# Patient Record
Sex: MALE | Race: WHITE | NOT HISPANIC OR LATINO | Employment: FULL TIME | URBAN - METROPOLITAN AREA
[De-identification: names, ages, dates, MRNs, and addresses within clinical notes are randomized per-mention and may not be internally consistent; named-entity substitution may affect disease eponyms.]

---

## 2017-01-06 ENCOUNTER — COMMUNICATION - HEALTHEAST (OUTPATIENT)
Dept: SCHEDULING | Facility: CLINIC | Age: 37
End: 2017-01-06

## 2017-01-06 DIAGNOSIS — I10 UNSPECIFIED ESSENTIAL HYPERTENSION: ICD-10-CM

## 2017-03-27 ENCOUNTER — COMMUNICATION - HEALTHEAST (OUTPATIENT)
Dept: FAMILY MEDICINE | Facility: CLINIC | Age: 37
End: 2017-03-27

## 2017-03-27 DIAGNOSIS — I10 UNSPECIFIED ESSENTIAL HYPERTENSION: ICD-10-CM

## 2017-05-18 ENCOUNTER — OFFICE VISIT - HEALTHEAST (OUTPATIENT)
Dept: FAMILY MEDICINE | Facility: CLINIC | Age: 37
End: 2017-05-18

## 2017-05-18 DIAGNOSIS — G47.33 OBSTRUCTIVE SLEEP APNEA (ADULT) (PEDIATRIC): ICD-10-CM

## 2017-05-18 DIAGNOSIS — Z00.00 HEALTH CARE MAINTENANCE: ICD-10-CM

## 2017-05-18 DIAGNOSIS — E66.9 OBESITY: ICD-10-CM

## 2017-05-18 DIAGNOSIS — I10 ESSENTIAL HYPERTENSION: ICD-10-CM

## 2017-05-19 ENCOUNTER — COMMUNICATION - HEALTHEAST (OUTPATIENT)
Dept: FAMILY MEDICINE | Facility: CLINIC | Age: 37
End: 2017-05-19

## 2017-05-19 ENCOUNTER — AMBULATORY - HEALTHEAST (OUTPATIENT)
Dept: FAMILY MEDICINE | Facility: CLINIC | Age: 37
End: 2017-05-19

## 2017-05-19 DIAGNOSIS — E87.6 HYPOKALEMIA: ICD-10-CM

## 2017-06-08 ENCOUNTER — COMMUNICATION - HEALTHEAST (OUTPATIENT)
Dept: FAMILY MEDICINE | Facility: CLINIC | Age: 37
End: 2017-06-08

## 2017-06-08 DIAGNOSIS — I10 UNSPECIFIED ESSENTIAL HYPERTENSION: ICD-10-CM

## 2017-07-10 ENCOUNTER — COMMUNICATION - HEALTHEAST (OUTPATIENT)
Dept: FAMILY MEDICINE | Facility: CLINIC | Age: 37
End: 2017-07-10

## 2017-07-10 DIAGNOSIS — I10 UNSPECIFIED ESSENTIAL HYPERTENSION: ICD-10-CM

## 2017-07-20 ENCOUNTER — AMBULATORY - HEALTHEAST (OUTPATIENT)
Dept: LAB | Facility: CLINIC | Age: 37
End: 2017-07-20

## 2017-07-20 DIAGNOSIS — E87.6 HYPOKALEMIA: ICD-10-CM

## 2017-07-21 ENCOUNTER — COMMUNICATION - HEALTHEAST (OUTPATIENT)
Dept: FAMILY MEDICINE | Facility: CLINIC | Age: 37
End: 2017-07-21

## 2017-07-21 ENCOUNTER — AMBULATORY - HEALTHEAST (OUTPATIENT)
Dept: FAMILY MEDICINE | Facility: CLINIC | Age: 37
End: 2017-07-21

## 2017-07-21 DIAGNOSIS — E87.6 HYPOKALEMIA: ICD-10-CM

## 2017-09-27 ENCOUNTER — COMMUNICATION - HEALTHEAST (OUTPATIENT)
Dept: FAMILY MEDICINE | Facility: CLINIC | Age: 37
End: 2017-09-27

## 2017-09-27 DIAGNOSIS — E87.6 HYPOKALEMIA: ICD-10-CM

## 2018-02-13 ENCOUNTER — COMMUNICATION - HEALTHEAST (OUTPATIENT)
Dept: FAMILY MEDICINE | Facility: CLINIC | Age: 38
End: 2018-02-13

## 2018-02-13 DIAGNOSIS — I10 ESSENTIAL HYPERTENSION: ICD-10-CM

## 2018-02-19 ENCOUNTER — OFFICE VISIT - HEALTHEAST (OUTPATIENT)
Dept: FAMILY MEDICINE | Facility: CLINIC | Age: 38
End: 2018-02-19

## 2018-02-19 DIAGNOSIS — I10 ESSENTIAL HYPERTENSION: ICD-10-CM

## 2018-02-19 DIAGNOSIS — E87.6 HYPOKALEMIA: ICD-10-CM

## 2018-02-19 DIAGNOSIS — R91.1 PULMONARY NODULE: ICD-10-CM

## 2018-02-19 DIAGNOSIS — Z12.9 CANCER SCREENING: ICD-10-CM

## 2018-02-19 LAB
ANION GAP SERPL CALCULATED.3IONS-SCNC: 11 MMOL/L (ref 5–18)
BUN SERPL-MCNC: 15 MG/DL (ref 8–22)
CALCIUM SERPL-MCNC: 9.6 MG/DL (ref 8.5–10.5)
CHLORIDE BLD-SCNC: 100 MMOL/L (ref 98–107)
CO2 SERPL-SCNC: 27 MMOL/L (ref 22–31)
CREAT SERPL-MCNC: 0.96 MG/DL (ref 0.7–1.3)
CREAT UR-MCNC: 213.3 MG/DL
GFR SERPL CREATININE-BSD FRML MDRD: >60 ML/MIN/1.73M2
GLUCOSE BLD-MCNC: 111 MG/DL (ref 70–125)
MICROALBUMIN UR-MCNC: 1.71 MG/DL (ref 0–1.99)
MICROALBUMIN/CREAT UR: 8 MG/G
POTASSIUM BLD-SCNC: 3.6 MMOL/L (ref 3.5–5)
SODIUM SERPL-SCNC: 138 MMOL/L (ref 136–145)

## 2018-02-23 ENCOUNTER — AMBULATORY - HEALTHEAST (OUTPATIENT)
Dept: PULMONOLOGY | Facility: OTHER | Age: 38
End: 2018-02-23

## 2018-02-23 DIAGNOSIS — R91.1 LUNG NODULE: ICD-10-CM

## 2018-03-12 ENCOUNTER — COMMUNICATION - HEALTHEAST (OUTPATIENT)
Dept: FAMILY MEDICINE | Facility: CLINIC | Age: 38
End: 2018-03-12

## 2018-07-25 ENCOUNTER — COMMUNICATION - HEALTHEAST (OUTPATIENT)
Dept: FAMILY MEDICINE | Facility: CLINIC | Age: 38
End: 2018-07-25

## 2018-07-25 DIAGNOSIS — I10 ESSENTIAL HYPERTENSION: ICD-10-CM

## 2018-08-07 ENCOUNTER — COMMUNICATION - HEALTHEAST (OUTPATIENT)
Dept: FAMILY MEDICINE | Facility: CLINIC | Age: 38
End: 2018-08-07

## 2018-08-07 ENCOUNTER — OFFICE VISIT - HEALTHEAST (OUTPATIENT)
Dept: FAMILY MEDICINE | Facility: CLINIC | Age: 38
End: 2018-08-07

## 2018-08-07 DIAGNOSIS — R41.840 DIFFICULTY CONCENTRATING: ICD-10-CM

## 2018-08-07 DIAGNOSIS — G47.33 OSA (OBSTRUCTIVE SLEEP APNEA): ICD-10-CM

## 2018-08-07 LAB — TSH SERPL DL<=0.005 MIU/L-ACNC: 1.06 UIU/ML (ref 0.3–5)

## 2018-09-11 ENCOUNTER — RECORDS - HEALTHEAST (OUTPATIENT)
Dept: ADMINISTRATIVE | Facility: OTHER | Age: 38
End: 2018-09-11

## 2018-10-12 ENCOUNTER — OFFICE VISIT - HEALTHEAST (OUTPATIENT)
Dept: FAMILY MEDICINE | Facility: CLINIC | Age: 38
End: 2018-10-12

## 2018-10-12 DIAGNOSIS — I10 ESSENTIAL HYPERTENSION: ICD-10-CM

## 2018-10-12 DIAGNOSIS — Z79.899 CONTROLLED SUBSTANCE AGREEMENT SIGNED: ICD-10-CM

## 2018-10-12 DIAGNOSIS — F90.9 ADHD (ATTENTION DEFICIT HYPERACTIVITY DISORDER): ICD-10-CM

## 2018-10-12 DIAGNOSIS — E87.6 HYPOKALEMIA: ICD-10-CM

## 2018-10-12 LAB
AMPHETAMINES UR QL SCN: NORMAL
ANION GAP SERPL CALCULATED.3IONS-SCNC: 13 MMOL/L (ref 5–18)
BARBITURATES UR QL: NORMAL
BENZODIAZ UR QL: NORMAL
BUN SERPL-MCNC: 16 MG/DL (ref 8–22)
CALCIUM SERPL-MCNC: 9.7 MG/DL (ref 8.5–10.5)
CANNABINOIDS UR QL SCN: NORMAL
CHLORIDE BLD-SCNC: 98 MMOL/L (ref 98–107)
CO2 SERPL-SCNC: 29 MMOL/L (ref 22–31)
COCAINE UR QL: NORMAL
CREAT SERPL-MCNC: 0.96 MG/DL (ref 0.7–1.3)
CREAT UR-MCNC: 99.9 MG/DL
GFR SERPL CREATININE-BSD FRML MDRD: >60 ML/MIN/1.73M2
GLUCOSE BLD-MCNC: 105 MG/DL (ref 70–125)
METHADONE UR QL SCN: NORMAL
OPIATES UR QL SCN: NORMAL
OXYCODONE UR QL: NORMAL
PCP UR QL SCN: NORMAL
POTASSIUM BLD-SCNC: 3.1 MMOL/L (ref 3.5–5)
SODIUM SERPL-SCNC: 140 MMOL/L (ref 136–145)

## 2018-10-13 ENCOUNTER — AMBULATORY - HEALTHEAST (OUTPATIENT)
Dept: FAMILY MEDICINE | Facility: CLINIC | Age: 38
End: 2018-10-13

## 2018-10-13 ENCOUNTER — COMMUNICATION - HEALTHEAST (OUTPATIENT)
Dept: FAMILY MEDICINE | Facility: CLINIC | Age: 38
End: 2018-10-13

## 2018-10-13 DIAGNOSIS — I10 ESSENTIAL HYPERTENSION: ICD-10-CM

## 2018-10-13 DIAGNOSIS — E87.6 HYPOKALEMIA: ICD-10-CM

## 2018-10-15 ENCOUNTER — COMMUNICATION - HEALTHEAST (OUTPATIENT)
Dept: FAMILY MEDICINE | Facility: CLINIC | Age: 38
End: 2018-10-15

## 2018-11-06 ENCOUNTER — COMMUNICATION - HEALTHEAST (OUTPATIENT)
Dept: FAMILY MEDICINE | Facility: CLINIC | Age: 38
End: 2018-11-06

## 2018-11-06 DIAGNOSIS — F90.9 ADHD (ATTENTION DEFICIT HYPERACTIVITY DISORDER): ICD-10-CM

## 2018-11-16 ENCOUNTER — OFFICE VISIT - HEALTHEAST (OUTPATIENT)
Dept: FAMILY MEDICINE | Facility: CLINIC | Age: 38
End: 2018-11-16

## 2018-11-16 DIAGNOSIS — E87.6 HYPOKALEMIA: ICD-10-CM

## 2018-11-16 DIAGNOSIS — F90.9 ATTENTION DEFICIT HYPERACTIVITY DISORDER (ADHD), UNSPECIFIED ADHD TYPE: ICD-10-CM

## 2018-11-16 DIAGNOSIS — I10 ESSENTIAL HYPERTENSION: ICD-10-CM

## 2018-11-16 LAB
ANION GAP SERPL CALCULATED.3IONS-SCNC: 14 MMOL/L (ref 5–18)
BUN SERPL-MCNC: 19 MG/DL (ref 8–22)
CALCIUM SERPL-MCNC: 9.4 MG/DL (ref 8.5–10.5)
CHLORIDE BLD-SCNC: 98 MMOL/L (ref 98–107)
CO2 SERPL-SCNC: 24 MMOL/L (ref 22–31)
CREAT SERPL-MCNC: 1 MG/DL (ref 0.7–1.3)
GFR SERPL CREATININE-BSD FRML MDRD: >60 ML/MIN/1.73M2
GLUCOSE BLD-MCNC: 157 MG/DL (ref 70–125)
POTASSIUM BLD-SCNC: 2.9 MMOL/L (ref 3.5–5)
SODIUM SERPL-SCNC: 136 MMOL/L (ref 136–145)

## 2018-11-17 ENCOUNTER — COMMUNICATION - HEALTHEAST (OUTPATIENT)
Dept: FAMILY MEDICINE | Facility: CLINIC | Age: 38
End: 2018-11-17

## 2018-11-19 ENCOUNTER — AMBULATORY - HEALTHEAST (OUTPATIENT)
Dept: FAMILY MEDICINE | Facility: CLINIC | Age: 38
End: 2018-11-19

## 2018-11-19 ENCOUNTER — COMMUNICATION - HEALTHEAST (OUTPATIENT)
Dept: FAMILY MEDICINE | Facility: CLINIC | Age: 38
End: 2018-11-19

## 2018-11-19 DIAGNOSIS — R73.9 ELEVATED BLOOD SUGAR: ICD-10-CM

## 2018-11-19 DIAGNOSIS — I10 ESSENTIAL HYPERTENSION: ICD-10-CM

## 2018-12-07 ENCOUNTER — COMMUNICATION - HEALTHEAST (OUTPATIENT)
Dept: FAMILY MEDICINE | Facility: CLINIC | Age: 38
End: 2018-12-07

## 2018-12-07 DIAGNOSIS — F90.9 ADHD (ATTENTION DEFICIT HYPERACTIVITY DISORDER): ICD-10-CM

## 2019-01-07 ENCOUNTER — COMMUNICATION - HEALTHEAST (OUTPATIENT)
Dept: FAMILY MEDICINE | Facility: CLINIC | Age: 39
End: 2019-01-07

## 2019-01-07 DIAGNOSIS — F90.9 ADHD (ATTENTION DEFICIT HYPERACTIVITY DISORDER): ICD-10-CM

## 2019-02-08 ENCOUNTER — COMMUNICATION - HEALTHEAST (OUTPATIENT)
Dept: FAMILY MEDICINE | Facility: CLINIC | Age: 39
End: 2019-02-08

## 2019-02-08 DIAGNOSIS — F90.9 ADHD (ATTENTION DEFICIT HYPERACTIVITY DISORDER): ICD-10-CM

## 2019-02-15 ENCOUNTER — COMMUNICATION - HEALTHEAST (OUTPATIENT)
Dept: FAMILY MEDICINE | Facility: CLINIC | Age: 39
End: 2019-02-15

## 2019-02-22 ENCOUNTER — COMMUNICATION - HEALTHEAST (OUTPATIENT)
Dept: FAMILY MEDICINE | Facility: CLINIC | Age: 39
End: 2019-02-22

## 2019-02-22 DIAGNOSIS — I10 ESSENTIAL HYPERTENSION: ICD-10-CM

## 2019-03-13 ENCOUNTER — COMMUNICATION - HEALTHEAST (OUTPATIENT)
Dept: FAMILY MEDICINE | Facility: CLINIC | Age: 39
End: 2019-03-13

## 2019-03-13 DIAGNOSIS — F90.9 ADHD (ATTENTION DEFICIT HYPERACTIVITY DISORDER): ICD-10-CM

## 2019-04-02 ENCOUNTER — COMMUNICATION - HEALTHEAST (OUTPATIENT)
Dept: FAMILY MEDICINE | Facility: CLINIC | Age: 39
End: 2019-04-02

## 2019-04-02 ENCOUNTER — AMBULATORY - HEALTHEAST (OUTPATIENT)
Dept: FAMILY MEDICINE | Facility: CLINIC | Age: 39
End: 2019-04-02

## 2019-04-02 ENCOUNTER — OFFICE VISIT - HEALTHEAST (OUTPATIENT)
Dept: FAMILY MEDICINE | Facility: CLINIC | Age: 39
End: 2019-04-02

## 2019-04-02 DIAGNOSIS — F90.9 ADHD (ATTENTION DEFICIT HYPERACTIVITY DISORDER): ICD-10-CM

## 2019-04-02 DIAGNOSIS — Z79.899 CONTROLLED SUBSTANCE AGREEMENT SIGNED: ICD-10-CM

## 2019-04-02 DIAGNOSIS — I10 ESSENTIAL HYPERTENSION: ICD-10-CM

## 2019-04-02 DIAGNOSIS — R73.9 ELEVATED BLOOD SUGAR: ICD-10-CM

## 2019-04-02 DIAGNOSIS — F90.9 ATTENTION DEFICIT HYPERACTIVITY DISORDER (ADHD), UNSPECIFIED ADHD TYPE: ICD-10-CM

## 2019-04-02 LAB
ANION GAP SERPL CALCULATED.3IONS-SCNC: 11 MMOL/L (ref 5–18)
BUN SERPL-MCNC: 17 MG/DL (ref 8–22)
CALCIUM SERPL-MCNC: 9.8 MG/DL (ref 8.5–10.5)
CHLORIDE BLD-SCNC: 104 MMOL/L (ref 98–107)
CO2 SERPL-SCNC: 25 MMOL/L (ref 22–31)
CREAT SERPL-MCNC: 0.99 MG/DL (ref 0.7–1.3)
GFR SERPL CREATININE-BSD FRML MDRD: >60 ML/MIN/1.73M2
GLUCOSE BLD-MCNC: 113 MG/DL (ref 70–125)
HBA1C MFR BLD: 5.3 % (ref 3.5–6)
POTASSIUM BLD-SCNC: 4.1 MMOL/L (ref 3.5–5)
SODIUM SERPL-SCNC: 140 MMOL/L (ref 136–145)

## 2019-04-09 ENCOUNTER — COMMUNICATION - HEALTHEAST (OUTPATIENT)
Dept: FAMILY MEDICINE | Facility: CLINIC | Age: 39
End: 2019-04-09

## 2019-04-09 DIAGNOSIS — F90.9 ADHD (ATTENTION DEFICIT HYPERACTIVITY DISORDER): ICD-10-CM

## 2019-04-12 ENCOUNTER — COMMUNICATION - HEALTHEAST (OUTPATIENT)
Dept: FAMILY MEDICINE | Facility: CLINIC | Age: 39
End: 2019-04-12

## 2019-04-29 ENCOUNTER — COMMUNICATION - HEALTHEAST (OUTPATIENT)
Dept: FAMILY MEDICINE | Facility: CLINIC | Age: 39
End: 2019-04-29

## 2019-04-29 DIAGNOSIS — I10 ESSENTIAL HYPERTENSION: ICD-10-CM

## 2019-05-10 ENCOUNTER — COMMUNICATION - HEALTHEAST (OUTPATIENT)
Dept: FAMILY MEDICINE | Facility: CLINIC | Age: 39
End: 2019-05-10

## 2019-05-10 DIAGNOSIS — F90.9 ADHD (ATTENTION DEFICIT HYPERACTIVITY DISORDER): ICD-10-CM

## 2019-06-11 ENCOUNTER — COMMUNICATION - HEALTHEAST (OUTPATIENT)
Dept: FAMILY MEDICINE | Facility: CLINIC | Age: 39
End: 2019-06-11

## 2019-06-11 DIAGNOSIS — F90.9 ADHD (ATTENTION DEFICIT HYPERACTIVITY DISORDER): ICD-10-CM

## 2019-06-19 ENCOUNTER — COMMUNICATION - HEALTHEAST (OUTPATIENT)
Dept: FAMILY MEDICINE | Facility: CLINIC | Age: 39
End: 2019-06-19

## 2019-06-19 DIAGNOSIS — I10 ESSENTIAL HYPERTENSION: ICD-10-CM

## 2019-07-17 ENCOUNTER — COMMUNICATION - HEALTHEAST (OUTPATIENT)
Dept: FAMILY MEDICINE | Facility: CLINIC | Age: 39
End: 2019-07-17

## 2019-07-17 DIAGNOSIS — F90.9 ADHD (ATTENTION DEFICIT HYPERACTIVITY DISORDER): ICD-10-CM

## 2019-08-15 ENCOUNTER — COMMUNICATION - HEALTHEAST (OUTPATIENT)
Dept: FAMILY MEDICINE | Facility: CLINIC | Age: 39
End: 2019-08-15

## 2019-08-15 DIAGNOSIS — F90.9 ADHD (ATTENTION DEFICIT HYPERACTIVITY DISORDER): ICD-10-CM

## 2019-09-17 ENCOUNTER — OFFICE VISIT - HEALTHEAST (OUTPATIENT)
Dept: FAMILY MEDICINE | Facility: CLINIC | Age: 39
End: 2019-09-17

## 2019-09-17 DIAGNOSIS — F90.9 ADHD (ATTENTION DEFICIT HYPERACTIVITY DISORDER): ICD-10-CM

## 2019-09-17 DIAGNOSIS — I10 ESSENTIAL HYPERTENSION: ICD-10-CM

## 2019-09-17 DIAGNOSIS — G47.33 OSA (OBSTRUCTIVE SLEEP APNEA): ICD-10-CM

## 2019-09-17 DIAGNOSIS — Z79.899 CONTROLLED SUBSTANCE AGREEMENT SIGNED: ICD-10-CM

## 2019-09-17 LAB
AMPHETAMINES UR QL SCN: ABNORMAL
BARBITURATES UR QL: ABNORMAL
BENZODIAZ UR QL: ABNORMAL
CANNABINOIDS UR QL SCN: ABNORMAL
COCAINE UR QL: ABNORMAL
CREAT UR-MCNC: 169.1 MG/DL
METHADONE UR QL SCN: ABNORMAL
OPIATES UR QL SCN: ABNORMAL
OXYCODONE UR QL: ABNORMAL
PCP UR QL SCN: ABNORMAL

## 2019-09-17 ASSESSMENT — MIFFLIN-ST. JEOR: SCORE: 2448.51

## 2019-09-18 ENCOUNTER — COMMUNICATION - HEALTHEAST (OUTPATIENT)
Dept: FAMILY MEDICINE | Facility: CLINIC | Age: 39
End: 2019-09-18

## 2019-09-19 ENCOUNTER — COMMUNICATION - HEALTHEAST (OUTPATIENT)
Dept: FAMILY MEDICINE | Facility: CLINIC | Age: 39
End: 2019-09-19

## 2019-09-19 DIAGNOSIS — I10 ESSENTIAL HYPERTENSION: ICD-10-CM

## 2019-09-19 DIAGNOSIS — F90.9 ADHD (ATTENTION DEFICIT HYPERACTIVITY DISORDER): ICD-10-CM

## 2019-10-15 ENCOUNTER — COMMUNICATION - HEALTHEAST (OUTPATIENT)
Dept: FAMILY MEDICINE | Facility: CLINIC | Age: 39
End: 2019-10-15

## 2019-10-15 DIAGNOSIS — F90.9 ADHD (ATTENTION DEFICIT HYPERACTIVITY DISORDER): ICD-10-CM

## 2019-11-18 ENCOUNTER — COMMUNICATION - HEALTHEAST (OUTPATIENT)
Dept: FAMILY MEDICINE | Facility: CLINIC | Age: 39
End: 2019-11-18

## 2019-11-18 DIAGNOSIS — F90.9 ADHD (ATTENTION DEFICIT HYPERACTIVITY DISORDER): ICD-10-CM

## 2019-11-27 ENCOUNTER — RECORDS - HEALTHEAST (OUTPATIENT)
Dept: ADMINISTRATIVE | Facility: OTHER | Age: 39
End: 2019-11-27

## 2019-12-23 ENCOUNTER — COMMUNICATION - HEALTHEAST (OUTPATIENT)
Dept: FAMILY MEDICINE | Facility: CLINIC | Age: 39
End: 2019-12-23

## 2019-12-23 DIAGNOSIS — F90.9 ADHD (ATTENTION DEFICIT HYPERACTIVITY DISORDER): ICD-10-CM

## 2019-12-24 ENCOUNTER — COMMUNICATION - HEALTHEAST (OUTPATIENT)
Dept: FAMILY MEDICINE | Facility: CLINIC | Age: 39
End: 2019-12-24

## 2019-12-24 DIAGNOSIS — F90.9 ADHD (ATTENTION DEFICIT HYPERACTIVITY DISORDER): ICD-10-CM

## 2020-01-22 ENCOUNTER — COMMUNICATION - HEALTHEAST (OUTPATIENT)
Dept: FAMILY MEDICINE | Facility: CLINIC | Age: 40
End: 2020-01-22

## 2020-01-22 DIAGNOSIS — F90.9 ADHD (ATTENTION DEFICIT HYPERACTIVITY DISORDER): ICD-10-CM

## 2020-02-24 ENCOUNTER — COMMUNICATION - HEALTHEAST (OUTPATIENT)
Dept: FAMILY MEDICINE | Facility: CLINIC | Age: 40
End: 2020-02-24

## 2020-02-24 DIAGNOSIS — F90.9 ADHD (ATTENTION DEFICIT HYPERACTIVITY DISORDER): ICD-10-CM

## 2020-03-24 ENCOUNTER — COMMUNICATION - HEALTHEAST (OUTPATIENT)
Dept: FAMILY MEDICINE | Facility: CLINIC | Age: 40
End: 2020-03-24

## 2020-03-24 DIAGNOSIS — F90.9 ADHD (ATTENTION DEFICIT HYPERACTIVITY DISORDER): ICD-10-CM

## 2020-04-01 ENCOUNTER — COMMUNICATION - HEALTHEAST (OUTPATIENT)
Dept: FAMILY MEDICINE | Facility: CLINIC | Age: 40
End: 2020-04-01

## 2020-04-03 ENCOUNTER — OFFICE VISIT - HEALTHEAST (OUTPATIENT)
Dept: FAMILY MEDICINE | Facility: CLINIC | Age: 40
End: 2020-04-03

## 2020-04-03 DIAGNOSIS — I10 ESSENTIAL HYPERTENSION: ICD-10-CM

## 2020-04-03 DIAGNOSIS — Z79.899 CONTROLLED SUBSTANCE AGREEMENT SIGNED: ICD-10-CM

## 2020-04-03 DIAGNOSIS — R73.03 PREDIABETES: ICD-10-CM

## 2020-04-03 DIAGNOSIS — Z00.00 HEALTH CARE MAINTENANCE: ICD-10-CM

## 2020-04-03 DIAGNOSIS — R73.03 PRE-DIABETES: ICD-10-CM

## 2020-04-27 ENCOUNTER — COMMUNICATION - HEALTHEAST (OUTPATIENT)
Dept: FAMILY MEDICINE | Facility: CLINIC | Age: 40
End: 2020-04-27

## 2020-04-27 DIAGNOSIS — F90.9 ADHD (ATTENTION DEFICIT HYPERACTIVITY DISORDER): ICD-10-CM

## 2020-06-01 ENCOUNTER — COMMUNICATION - HEALTHEAST (OUTPATIENT)
Dept: FAMILY MEDICINE | Facility: CLINIC | Age: 40
End: 2020-06-01

## 2020-06-01 DIAGNOSIS — F90.9 ADHD (ATTENTION DEFICIT HYPERACTIVITY DISORDER): ICD-10-CM

## 2020-06-03 ENCOUNTER — COMMUNICATION - HEALTHEAST (OUTPATIENT)
Dept: FAMILY MEDICINE | Facility: CLINIC | Age: 40
End: 2020-06-03

## 2020-06-03 DIAGNOSIS — I10 ESSENTIAL HYPERTENSION: ICD-10-CM

## 2020-06-10 ENCOUNTER — COMMUNICATION - HEALTHEAST (OUTPATIENT)
Dept: FAMILY MEDICINE | Facility: CLINIC | Age: 40
End: 2020-06-10

## 2020-06-10 DIAGNOSIS — I10 ESSENTIAL HYPERTENSION: ICD-10-CM

## 2020-06-29 ENCOUNTER — AMBULATORY - HEALTHEAST (OUTPATIENT)
Dept: LAB | Facility: CLINIC | Age: 40
End: 2020-06-29

## 2020-06-29 DIAGNOSIS — Z00.00 HEALTH CARE MAINTENANCE: ICD-10-CM

## 2020-06-29 DIAGNOSIS — I10 ESSENTIAL HYPERTENSION: ICD-10-CM

## 2020-06-29 DIAGNOSIS — R73.03 PRE-DIABETES: ICD-10-CM

## 2020-06-29 LAB
ANION GAP SERPL CALCULATED.3IONS-SCNC: 11 MMOL/L (ref 5–18)
BUN SERPL-MCNC: 14 MG/DL (ref 8–22)
CALCIUM SERPL-MCNC: 9.5 MG/DL (ref 8.5–10.5)
CHLORIDE BLD-SCNC: 105 MMOL/L (ref 98–107)
CHOLEST SERPL-MCNC: 148 MG/DL
CO2 SERPL-SCNC: 24 MMOL/L (ref 22–31)
CREAT SERPL-MCNC: 1.14 MG/DL (ref 0.7–1.3)
FASTING STATUS PATIENT QL REPORTED: YES
GFR SERPL CREATININE-BSD FRML MDRD: >60 ML/MIN/1.73M2
GLUCOSE BLD-MCNC: 96 MG/DL (ref 70–125)
HBA1C MFR BLD: 5.3 % (ref 3.5–6)
HDLC SERPL-MCNC: 28 MG/DL
LDLC SERPL CALC-MCNC: ABNORMAL MG/DL
POTASSIUM BLD-SCNC: 4 MMOL/L (ref 3.5–5)
SODIUM SERPL-SCNC: 140 MMOL/L (ref 136–145)
TRIGL SERPL-MCNC: 568 MG/DL

## 2020-06-30 ENCOUNTER — COMMUNICATION - HEALTHEAST (OUTPATIENT)
Dept: FAMILY MEDICINE | Facility: CLINIC | Age: 40
End: 2020-06-30

## 2020-06-30 DIAGNOSIS — E78.1 HYPERTRIGLYCERIDEMIA: ICD-10-CM

## 2020-07-06 ENCOUNTER — COMMUNICATION - HEALTHEAST (OUTPATIENT)
Dept: FAMILY MEDICINE | Facility: CLINIC | Age: 40
End: 2020-07-06

## 2020-07-06 DIAGNOSIS — F90.9 ADHD (ATTENTION DEFICIT HYPERACTIVITY DISORDER): ICD-10-CM

## 2020-07-10 ENCOUNTER — COMMUNICATION - HEALTHEAST (OUTPATIENT)
Dept: FAMILY MEDICINE | Facility: CLINIC | Age: 40
End: 2020-07-10

## 2020-08-06 ENCOUNTER — COMMUNICATION - HEALTHEAST (OUTPATIENT)
Dept: FAMILY MEDICINE | Facility: CLINIC | Age: 40
End: 2020-08-06

## 2020-08-06 DIAGNOSIS — F90.9 ADHD (ATTENTION DEFICIT HYPERACTIVITY DISORDER): ICD-10-CM

## 2020-09-08 ENCOUNTER — COMMUNICATION - HEALTHEAST (OUTPATIENT)
Dept: FAMILY MEDICINE | Facility: CLINIC | Age: 40
End: 2020-09-08

## 2020-09-08 DIAGNOSIS — F90.9 ADHD (ATTENTION DEFICIT HYPERACTIVITY DISORDER): ICD-10-CM

## 2020-09-15 ENCOUNTER — OFFICE VISIT - HEALTHEAST (OUTPATIENT)
Dept: FAMILY MEDICINE | Facility: CLINIC | Age: 40
End: 2020-09-15

## 2020-09-15 DIAGNOSIS — G47.33 OSA (OBSTRUCTIVE SLEEP APNEA): ICD-10-CM

## 2020-09-15 DIAGNOSIS — E78.1 HYPERTRIGLYCERIDEMIA: ICD-10-CM

## 2020-09-15 DIAGNOSIS — Z79.899 CONTROLLED SUBSTANCE AGREEMENT SIGNED: ICD-10-CM

## 2020-09-22 ENCOUNTER — COMMUNICATION - HEALTHEAST (OUTPATIENT)
Dept: FAMILY MEDICINE | Facility: CLINIC | Age: 40
End: 2020-09-22

## 2020-09-22 ENCOUNTER — AMBULATORY - HEALTHEAST (OUTPATIENT)
Dept: LAB | Facility: CLINIC | Age: 40
End: 2020-09-22

## 2020-09-22 DIAGNOSIS — Z79.899 CONTROLLED SUBSTANCE AGREEMENT SIGNED: ICD-10-CM

## 2020-09-22 DIAGNOSIS — E78.1 HYPERTRIGLYCERIDEMIA: ICD-10-CM

## 2020-09-22 LAB
AMPHETAMINES UR QL SCN: ABNORMAL
BARBITURATES UR QL: ABNORMAL
BENZODIAZ UR QL: ABNORMAL
CANNABINOIDS UR QL SCN: ABNORMAL
CHOLEST SERPL-MCNC: 135 MG/DL
COCAINE UR QL: ABNORMAL
CREAT UR-MCNC: 261.9 MG/DL
FASTING STATUS PATIENT QL REPORTED: ABNORMAL
HDLC SERPL-MCNC: 30 MG/DL
LDLC SERPL CALC-MCNC: 59 MG/DL
OPIATES UR QL SCN: ABNORMAL
OXYCODONE UR QL: ABNORMAL
PCP UR QL SCN: ABNORMAL
TRIGL SERPL-MCNC: 228 MG/DL
TSH SERPL DL<=0.005 MIU/L-ACNC: 1.33 UIU/ML (ref 0.3–5)

## 2020-09-29 RX ORDER — DEXTROAMPHETAMINE SACCHARATE, AMPHETAMINE ASPARTATE MONOHYDRATE, DEXTROAMPHETAMINE SULFATE AND AMPHETAMINE SULFATE 5; 5; 5; 5 MG/1; MG/1; MG/1; MG/1
CAPSULE, EXTENDED RELEASE ORAL
COMMUNITY
Start: 2019-10-18 | End: 2021-08-04

## 2020-09-29 RX ORDER — LOSARTAN POTASSIUM 100 MG/1
TABLET ORAL
COMMUNITY
Start: 2019-09-17 | End: 2022-02-28

## 2020-09-30 ENCOUNTER — VIRTUAL VISIT (OUTPATIENT)
Dept: SLEEP MEDICINE | Facility: CLINIC | Age: 40
End: 2020-09-30
Payer: COMMERCIAL

## 2020-09-30 VITALS — HEIGHT: 76 IN | WEIGHT: 314 LBS | BODY MASS INDEX: 38.24 KG/M2

## 2020-09-30 DIAGNOSIS — G47.33 OBSTRUCTIVE SLEEP APNEA: Primary | ICD-10-CM

## 2020-09-30 DIAGNOSIS — G47.10 HYPERSOMNIA: ICD-10-CM

## 2020-09-30 PROBLEM — F90.9 ADHD (ATTENTION DEFICIT HYPERACTIVITY DISORDER): Status: ACTIVE | Noted: 2018-10-12

## 2020-09-30 PROCEDURE — 99201 ZZC OFFICE/OUTPT VISIT, NEW, LEVEL I: CPT | Mod: GT | Performed by: INTERNAL MEDICINE

## 2020-09-30 ASSESSMENT — MIFFLIN-ST. JEOR: SCORE: 2445.79

## 2020-09-30 NOTE — PROGRESS NOTES
"Michael Cunningham is a 40 year old male who is being evaluated via a billable video visit.      The patient has been notified of following:     \"This video visit will be conducted via a call between you and your physician/provider. We have found that certain health care needs can be provided without the need for an in-person physical exam.  This service lets us provide the care you need with a video conversation.  If a prescription is necessary we can send it directly to your pharmacy.  If lab work is needed we can place an order for that and you can then stop by our lab to have the test done at a later time.    Video visits are billed at different rates depending on your insurance coverage.  Please reach out to your insurance provider with any questions.    If during the course of the call the physician/provider feels a video visit is not appropriate, you will not be charged for this service.\"    Patient has given verbal consent for Video visit? Yes  How would you like to obtain your AVS? MyChart  If you are dropped from the video visit, the video invite should be resent to: Text to cell phone: [phone]  Will anyone else be joining your video visit? No    Video-Visit Details    Type of service:  Video Visit    Originating Location (pt. Location): Home    Distant Location (provider location):  [facility]     Platform used for Video Visit: MST    Audio and visual devices were used for this virtual clinic visit with permission from patient.    I spent a total of 15 minutes of face-to-face encounter and in preparation for this clinic visit.    Thank you for the opportunity to participate in the care of  Michael Cunningham.    Assessment and Plan:    In summary Michael Cunningham is a 40 year old year old male here for sleep disturbance.  1. Obstructive sleep apnea  Due to the fact that we already have a copy of the patient's sleep study in our records, no need to repeat sleep.  I will therefore write " a prescription for the patient to receive a new CPAP machine from [facility] as per his request.  I welcomed the patient follow-up with me in 6 weeks.  - COMPREHENSIVE DME    2. Hypersomnia  - COMPREHENSIVE DME      History of present illness:    He is a 40 year old male who comes to the virtual clinic for the transfer of care of his obstructive sleep.  The patient was diagnosed with obstructive sleep apnea at our facility on 11/09/2008 with an apnea hypopnea index of 11.6 events per hour with lowest O2 sat of 76%.  The patient has been on CPAP therapy since that time.  He states that he has been doing well on CPAP therapy until his machine recently broke.  He would like to establish care so that he can get a new CPAP machine.  The patient's review of systems is otherwise negative.     Ideal Sleep-Wake Cycle(devoid of societal pressure):    Patient would try to initiate sleep at around 11:30 PM with a sleep latency of 10-15 minutes. The patient would have 1 awakenings. Final wake up time is around 9 AM.    Past Medical History  Past Medical History:   Diagnosis Date     ADHD      HTN (hypertension)         Past Surgical History  History reviewed. No pertinent surgical history.     Meds  Current Outpatient Medications   Medication Sig Dispense Refill     amphetamine-dextroamphetamine (ADDERALL XR) 20 MG 24 hr capsule Take 1 capsule by mouth daily.       losartan (COZAAR) 100 MG tablet TAKE 1 TABLET BY MOUTH ONE TIME DAILY.          Allergies  Sulfa drugs     Social History  Social History     Socioeconomic History     Marital status:      Spouse name: Not on file     Number of children: Not on file     Years of education: Not on file     Highest education level: Not on file   Occupational History     Not on file   Social Needs     Financial resource strain: Not on file     Food insecurity     Worry: Not on file     Inability: Not on file     Transportation needs     Medical: Not on file     Non-medical:  Not on file   Tobacco Use     Smoking status: Former Smoker     Smokeless tobacco: Current User   Substance and Sexual Activity     Alcohol use: Not on file     Drug use: Not on file     Sexual activity: Not on file   Lifestyle     Physical activity     Days per week: Not on file     Minutes per session: Not on file     Stress: Not on file   Relationships     Social connections     Talks on phone: Not on file     Gets together: Not on file     Attends Yazidism service: Not on file     Active member of club or organization: Not on file     Attends meetings of clubs or organizations: Not on file     Relationship status: Not on file     Intimate partner violence     Fear of current or ex partner: Not on file     Emotionally abused: Not on file     Physically abused: Not on file     Forced sexual activity: Not on file   Other Topics Concern     Parent/sibling w/ CABG, MI or angioplasty before 65F 55M? Not Asked   Social History Narrative     Not on file        Family History  Family History   Problem Relation Age of Onset     Snoring Mother      Review of Systems:  Constitutional: Negative except as noted in HPI.   Eyes: Negative except as noted in HPI.   ENT: Negative except as noted in HPI.   Cardiovascular: Negative except as noted in HPI.   Respiratory: Negative except as noted in HPI.   Gastrointestinal: Negative except as noted in HPI.   Genitourinary: Negative except as noted in HPI.   Musculoskeletal: Negative except as noted in HPI.   Integumentary: Negative except as noted in HPI.   Neurological: Negative except as noted in HPI.   Psychiatric: Negative except as noted in HPI.   Endocrine: Negative except as noted in HPI.   Hematologic/Lymphatic: Negative except as noted in HPI.      Physical Exam:  GEN: NAD,   Head: Normocephalic.  EYES: EOMI  ENT: Oropharynx is clear, Lord class 4+ airway.   Neurological: Alert, oriented to time, place, and person.  Psych: normal mood, normal affect     Labs/Studies:      No results found for: PH, PHARTERIAL, PO2, [id number], SAT, PCO2, HCO3, BASEEXCESS, JEROME, BEB  No results found for: TSH  No results found for: GLC  No results found for: HGB  No results found for: BUN, CR  No results found for: AST, ALT, GGT, ALKPHOS, BILITOTAL, BILICONJ, BILIDIRECT, HARSHAD  No results found for: UAMP, UBARB, BENZODIAZEUR, UCANN, UCOC, OPIT, UPCP    No components found for: FERRITIN      Patient verbalized understanding of these issues, agrees with the plan and all questions were answered today. Patient was given an opportuntity to voice any other symptoms or concerns not listed above. Patient did not have any other symptoms or concerns.        Malik Rivas DO  Board Certified in Internal Medicine and Sleep Medicine    (Note created with Dragon voice recognition and unintended spelling errors and word substitutions may occur)

## 2020-09-30 NOTE — PATIENT INSTRUCTIONS
Your BMI is Body mass index is 38.22 kg/m .  Weight management is a personal decision.  If you are interested in exploring weight loss strategies, the following discussion covers the approaches that may be successful. Body mass index (BMI) is one way to tell whether you are at a healthy weight, overweight, or obese. It measures your weight in relation to your height.  A BMI of 18.5 to 24.9 is in the healthy range. A person with a BMI of 25 to 29.9 is considered overweight, and someone with a BMI of 30 or greater is considered obese. More than two-thirds of American adults are considered overweight or obese.  Being overweight or obese increases the risk for further weight gain. Excess weight may lead to heart disease and diabetes.  Creating and following plans for healthy eating and physical activity may help you improve your health.  Weight control is part of healthy lifestyle and includes exercise, emotional health, and healthy eating habits. Careful eating habits lifelong are the mainstay of weight control. Though there are significant health benefits from weight loss, long-term weight loss with diet alone may be very difficult to achieve- studies show long-term success with dietary management in less than 10% of people. Attaining a healthy weight may be especially difficult to achieve in those with severe obesity. In some cases, medications, devices and surgical management might be considered.  What can you do?  If you are overweight or obese and are interested in methods for weight loss, you should discuss this with your provider.     Consider reducing daily calorie intake by 500 calories.     Keep a food journal.     Avoiding skipping meals, consider cutting portions instead.    Diet combined with exercise helps maintain muscle while optimizing fat loss. Strength training is particularly important for building and maintaining muscle mass. Exercise helps reduce stress, increase energy, and improves fitness.  Increasing exercise without diet control, however, may not burn enough calories to loose weight.       Start walking three days a week 10-20 minutes at a time    Work towards walking thirty minutes five days a week     Eventually, increase the speed of your walking for 1-2 minutes at time    In addition, we recommend that you review healthy lifestyles and methods for weight loss available through the National Institutes of Health patient information sites:  http://win.niddk.nih.gov/publications/index.htm    And look into health and wellness programs that may be available through your health insurance provider, employer, local community center, or betty club.    Weight management plan: Patient was referred to their PCP to discuss a diet and exercise plan.  Equipment Instructions    We will process your PAP order and send it to a Durable Medical Equipment (DME) provider.    The medical equipment company should call you within 7 days.  If you have not heard from the company, please contact them to see if they received your order and are planning to call you.    Please call us at [phone] if you are unable to contact the medical equipment company or if they do not have the order.    If you are starting a new PAP machine, please call us after you use it the first night to let us know how it went. This call also helps us know that you received your equipment and that everything is ready. Please use our central phone number [phone]    Contact information for [employer] company:    [employer] Tel: [phone]

## 2020-10-06 ENCOUNTER — DOCUMENTATION ONLY (OUTPATIENT)
Dept: SLEEP MEDICINE | Facility: CLINIC | Age: 40
End: 2020-10-06

## 2020-10-06 NOTE — PROGRESS NOTES
10/6/2020- CALLED AND EXPLAINED THAT ONCE [facility] GETS ALL DOCUMENTATION AND VERIFIES INS HE WILL BE CALLED FORO REPLACEMENT SETUP. TOLD HIM TO CALL IN 1 WEEK IF HE HAS NOT HEARD FROM [facility].

## 2020-10-19 ENCOUNTER — TELEPHONE (OUTPATIENT)
Dept: SLEEP MEDICINE | Facility: CLINIC | Age: 40
End: 2020-10-19

## 2020-10-19 NOTE — TELEPHONE ENCOUNTER
CALLED PT AND SCHEDULED THE PT FOR A REPLACEMENT CPAP SETUP APPOINTMENT 10/21/2020 10AM AT THE [facility]. CONFIRMED LOCATION,TIME AND DATE WITH THE PT.

## 2020-10-21 ENCOUNTER — DOCUMENTATION ONLY (OUTPATIENT)
Dept: SLEEP MEDICINE | Facility: CLINIC | Age: 40
End: 2020-10-21

## 2020-10-21 NOTE — PROGRESS NOTES
Patient was offered choice of vendor and chose [facility].  Patient Michael Cunningham was set up at [facility]  on October 21, 2020. Patient received a [employer]med AirSense 10 Auto. Pressures were set at 5-15 cm H2O.   Patient s ramp is 5 cm H2O for Off and FLEX/EPR is EPR, 1.  Patient received a Resmed Mask name: Airfit F20  Full Face mask size Large, heated tubing and heated humidifier.  Patient does not need to meet compliance. Patient has a follow up on TBD with Dr. Rivas.    Josefina Carreon

## 2020-10-26 ENCOUNTER — DOCUMENTATION ONLY (OUTPATIENT)
Dept: SLEEP MEDICINE | Facility: CLINIC | Age: 40
End: 2020-10-26
Payer: COMMERCIAL

## 2020-10-26 NOTE — PROGRESS NOTES
3 DAY STM VISIT    Diagnostic AHI: 11.6  PSG    Patient contacted for 3 day STM visit.    Confirmed with patient at time of call- N/A Patient is still interested in STM service.    Replacement device: Yes  STM ordered by provider: Yes     Device type: Auto-CPAP  PAP settings from order::  CPAP min 5 cm  H20       CPAP max 15 cm  H20        Mask type:    Full Face Mask     Device settings from machine      Min CPAP 5.0            Max CPAP 15.0          EPR level Setting: ONE      RESMED Soft response setting:  OFF  Assessment: Nightly usage, most nights over four hours   Action plan: Patient to have 14 day STM visit. Patient has a follow up visit scheduled:   no.     Total time spent on accessing and  interpreting remote patient PAP therapy data  10 minutes  Total time spent counseling, coaching  and reviewing PAP therapy data with patient  1 minutes  [id number]

## 2020-11-05 ENCOUNTER — COMMUNICATION - HEALTHEAST (OUTPATIENT)
Dept: FAMILY MEDICINE | Facility: CLINIC | Age: 40
End: 2020-11-05

## 2020-11-05 ENCOUNTER — DOCUMENTATION ONLY (OUTPATIENT)
Dept: SLEEP MEDICINE | Facility: CLINIC | Age: 40
End: 2020-11-05
Payer: COMMERCIAL

## 2020-11-05 DIAGNOSIS — F90.9 ADHD (ATTENTION DEFICIT HYPERACTIVITY DISORDER): ICD-10-CM

## 2020-11-05 NOTE — PROGRESS NOTES
14  DAY STM VISIT    Diagnostic AHI: 11.6 PSG    Data only recheck     Assessment: Pt meeting objective benchmarks.       Action plan: pt to have 30 day STM visit.      Device type: Auto-CPAP    PAP settings: CPAP min 5.0 cm  H20       CPAP max 15.0 cm  H20      95th% pressure 12.8 cm  H20        RESMED EPR level Setting: ONE    RESMED Soft response setting:  OFF    Mask type:  Full Face Mask    Objective measures: 14 day rolling measures      Compliance  100 %      Leak  8.74  lpm  last  upload      AHI 1.39   last  upload      Average number of minutes 474      Objective measure goal  Compliance   Goal >70%  Leak   Goal < 24 lpm  AHI  Goal < 5  Usage  Goal >240        Total time spent on accessing and interpreting remote patient PAP therapy data  10 minutes    Total time spent counseling, coaching  and reviewing PAP therapy data with patient  1 minutes    [id number]  [id number]  no (3 day STM)

## 2020-11-20 ENCOUNTER — DOCUMENTATION ONLY (OUTPATIENT)
Dept: SLEEP MEDICINE | Facility: CLINIC | Age: 40
End: 2020-11-20

## 2020-11-20 NOTE — PROGRESS NOTES
30 DAY STM VISIT    Diagnostic AHI: 11.6  PSG     Data only recheck     Assessment: Pt meeting objective benchmarks.     Action plan: pt to have 6 month STM visit  Patient has not scheduled a follow up visit.   Device type: Auto-CPAP  PAP settings: CPAP min 5.0 cm  H20     CPAP max 15.0 cm  H20    95th% pressure 13.4 cm  H20      RESMED EPR level Setting: ONE    RESMED Soft response setting:  OFF  Mask type:  Full Face Mask  Objective measures: 14 day rolling measures      Compliance  92 %      Leak  1.37 lpm  last  upload      AHI 1.66   last  upload      Average number of minutes 478      Objective measure goal  Compliance   Goal >70%  Leak   Goal < 24 lpm  AHI  Goal < 5  Usage  Goal >240        Total time spent on accessing and interpreting remote patient PAP therapy data  10 minutes    Total time spent counseling, coaching  and reviewing PAP therapy data with patient  0 minutes     [id number] this call  [id number] no  at 3 or 14 day [facility]

## 2020-11-30 ENCOUNTER — COMMUNICATION - HEALTHEAST (OUTPATIENT)
Dept: FAMILY MEDICINE | Facility: CLINIC | Age: 40
End: 2020-11-30

## 2020-11-30 DIAGNOSIS — I10 ESSENTIAL HYPERTENSION: ICD-10-CM

## 2020-12-07 ENCOUNTER — COMMUNICATION - HEALTHEAST (OUTPATIENT)
Dept: FAMILY MEDICINE | Facility: CLINIC | Age: 40
End: 2020-12-07

## 2020-12-07 DIAGNOSIS — F90.9 ADHD (ATTENTION DEFICIT HYPERACTIVITY DISORDER): ICD-10-CM

## 2020-12-13 ENCOUNTER — HEALTH MAINTENANCE LETTER (OUTPATIENT)
Age: 40
End: 2020-12-13

## 2021-01-04 ENCOUNTER — COMMUNICATION - HEALTHEAST (OUTPATIENT)
Dept: FAMILY MEDICINE | Facility: CLINIC | Age: 41
End: 2021-01-04

## 2021-01-04 DIAGNOSIS — F90.9 ADHD (ATTENTION DEFICIT HYPERACTIVITY DISORDER): ICD-10-CM

## 2021-02-03 ENCOUNTER — COMMUNICATION - HEALTHEAST (OUTPATIENT)
Dept: FAMILY MEDICINE | Facility: CLINIC | Age: 41
End: 2021-02-03

## 2021-02-03 DIAGNOSIS — F90.9 ADHD (ATTENTION DEFICIT HYPERACTIVITY DISORDER): ICD-10-CM

## 2021-02-23 ENCOUNTER — OFFICE VISIT - HEALTHEAST (OUTPATIENT)
Dept: FAMILY MEDICINE | Facility: CLINIC | Age: 41
End: 2021-02-23

## 2021-02-23 DIAGNOSIS — Z79.899 CONTROLLED SUBSTANCE AGREEMENT SIGNED: ICD-10-CM

## 2021-02-23 DIAGNOSIS — F90.9 ATTENTION DEFICIT HYPERACTIVITY DISORDER (ADHD), UNSPECIFIED ADHD TYPE: ICD-10-CM

## 2021-02-23 DIAGNOSIS — G47.33 OSA (OBSTRUCTIVE SLEEP APNEA): ICD-10-CM

## 2021-02-23 DIAGNOSIS — I10 ESSENTIAL HYPERTENSION: ICD-10-CM

## 2021-02-23 ASSESSMENT — MIFFLIN-ST. JEOR: SCORE: 2491.15

## 2021-03-05 ENCOUNTER — COMMUNICATION - HEALTHEAST (OUTPATIENT)
Dept: FAMILY MEDICINE | Facility: CLINIC | Age: 41
End: 2021-03-05

## 2021-03-05 DIAGNOSIS — F90.9 ADHD (ATTENTION DEFICIT HYPERACTIVITY DISORDER): ICD-10-CM

## 2021-04-06 ENCOUNTER — COMMUNICATION - HEALTHEAST (OUTPATIENT)
Dept: FAMILY MEDICINE | Facility: CLINIC | Age: 41
End: 2021-04-06

## 2021-04-06 DIAGNOSIS — F90.9 ADHD (ATTENTION DEFICIT HYPERACTIVITY DISORDER): ICD-10-CM

## 2021-04-15 ENCOUNTER — AMBULATORY - HEALTHEAST (OUTPATIENT)
Dept: NURSING | Facility: CLINIC | Age: 41
End: 2021-04-15

## 2021-04-15 DIAGNOSIS — I10 ESSENTIAL HYPERTENSION: ICD-10-CM

## 2021-04-18 ENCOUNTER — AMBULATORY - HEALTHEAST (OUTPATIENT)
Dept: FAMILY MEDICINE | Facility: CLINIC | Age: 41
End: 2021-04-18

## 2021-04-18 ENCOUNTER — COMMUNICATION - HEALTHEAST (OUTPATIENT)
Dept: FAMILY MEDICINE | Facility: CLINIC | Age: 41
End: 2021-04-18

## 2021-04-18 DIAGNOSIS — F90.9 ADHD (ATTENTION DEFICIT HYPERACTIVITY DISORDER): ICD-10-CM

## 2021-04-18 DIAGNOSIS — I10 ESSENTIAL HYPERTENSION: ICD-10-CM

## 2021-05-05 ENCOUNTER — COMMUNICATION - HEALTHEAST (OUTPATIENT)
Dept: FAMILY MEDICINE | Facility: CLINIC | Age: 41
End: 2021-05-05

## 2021-05-05 DIAGNOSIS — F90.9 ADHD (ATTENTION DEFICIT HYPERACTIVITY DISORDER): ICD-10-CM

## 2021-05-27 VITALS — HEART RATE: 93 BPM | DIASTOLIC BLOOD PRESSURE: 95 MMHG | SYSTOLIC BLOOD PRESSURE: 150 MMHG

## 2021-05-27 NOTE — PATIENT INSTRUCTIONS - HE
Follow up in 6 months for CSA and UDS renewal    Check BMP    STOP POTASSIUM    Provided the potassium is normal then we will increase the losartan to 100 mg daily  After increase in losartan then nurse BP check in one week and appt in 3 weeks

## 2021-05-27 NOTE — PROGRESS NOTES
DIAGNOSIS:  1. Hypertension , slightly elevated    2. Attention deficit hyperactivity disorder (ADHD), unspecified ADHD type , stable    3. Controlled substance agreement signed         PLAN:  Patient Instructions   Follow up in 6 months for CSA and UDS renewal    Check BMP    STOP POTASSIUM    Provided the potassium is normal then we will increase the losartan to 100 mg daily  After increase in losartan then nurse BP check in one week and appt in 3 weeks            HPI: Six month controlled substance follow up.  Adderall continuing to benefit him.  Thoughts are organized.  Rare difficulty in getting to sleep.  No palpitations.  Appetite is ok. Losing weight intentionally.  Has been taking potassium two tablets daily every morning.  Review of  Letter in Nov 2018 indicates that the potassium was to be stopped after 3 days.  Feeling good.          Current Outpatient Medications on File Prior to Visit   Medication Sig Dispense Refill     dextroamphetamine-amphetamine (ADDERALL XR) 20 MG 24 hr capsule Take 1 capsule (20 mg total) by mouth daily. 30 capsule 0     losartan (COZAAR) 50 MG tablet Take 1 tablet (50 mg total) by mouth daily. 90 tablet 0     [DISCONTINUED] potassium chloride (KLOR-CON) 10 MEQ CR tablet Take 20 mEq by mouth daily.         3     No current facility-administered medications on file prior to visit.        Pmh: reviewed  Psh: reviewed  Allergy:  reviewed      EXAM:    BP (!) 141/92   Pulse 76   Temp 96.9  F (36.1  C) (Oral)   Resp 16   Wt (!) 316 lb 12.8 oz (143.7 kg)   BMI 39.60 kg/m    GEN:   ALERT, NAD, ORIENTED TIMES THREE  NECK: SUPPLE WITHOUT ADENOPATHY OR THYROMEGALY  LUNGS: CTA  COR: RRR WITHOUT MURMUR  SKIN: NO RASH , ULCERS OR LESIONS NOTED  EXT: WITHOUT EDEMA/SWELLING    No results found for this or any previous visit (from the past 168 hour(s)).

## 2021-05-27 NOTE — TELEPHONE ENCOUNTER
Controlled Substance Refill Request  Medication:   Requested Prescriptions     Pending Prescriptions Disp Refills     dextroamphetamine-amphetamine (ADDERALL XR) 20 MG 24 hr capsule [Pharmacy Med Name: Amphetamine-Dextroamphet ER Oral Capsule Extended Release 24 Hour 20 MG] 30 capsule 0     Sig: TAKE ONE CAPSULE BY MOUTH ONE TIME DAILY     Date Last Fill: 3/13/19  [facility]   Submit electronically to pharmacy    Controlled Substance Agreement on File:   Encounter-Level CSA Scan Date:    There are no encounter-level csa scan date.       Last office visit with primary: 4/2/2019

## 2021-05-28 NOTE — TELEPHONE ENCOUNTER
Refill Approved    Rx renewed per Medication Renewal Policy. Medication was last renewed on 2/23/19.    Claudine Wright, [facility] Triage/Med Refill 4/30/2019     Requested Prescriptions   Pending Prescriptions Disp Refills     losartan (COZAAR) 50 MG tablet [Pharmacy Med Name: Losartan Potassium Oral Tablet 50 MG] 90 tablet 0     Sig: Take 1 tablet (50 mg total) by mouth daily.       Angiotensin Receptor Blocker Protocol Passed - 4/29/2019 12:50 PM        Passed - PCP or prescribing provider visit in past 12 months       Last office visit with prescriber/PCP: 4/2/2019 Giles Méndez MD OR same dept: 4/2/2019 Giles Méndez MD OR same specialty: 4/2/2019 Giles Médnez MD  Last physical: Visit date not found Last MTM visit: Visit date not found   Next visit within 3 mo: Visit date not found  Next physical within 3 mo: Visit date not found  Prescriber OR PCP: Giles Méndez MD  Last diagnosis associated with med order: 1. Hypertension  - losartan (COZAAR) 50 MG tablet [Pharmacy Med Name: Losartan Potassium Oral Tablet 50 MG]; Take 1 tablet (50 mg total) by mouth daily.  Dispense: 90 tablet; Refill: 0    If protocol passes may refill for 12 months if within 3 months of last provider visit (or a total of 15 months).             Passed - Serum potassium within the past 12 months     Lab Results   Component Value Date    Potassium 4.1 04/02/2019             Passed - Blood pressure filed in past 12 months     BP Readings from Last 1 Encounters:   04/02/19 (!) 141/92             Passed - Serum creatinine within the past 12 months     Creatinine   Date Value Ref Range Status   04/02/2019 0.99 0.70 - 1.30 mg/dL Final

## 2021-05-28 NOTE — TELEPHONE ENCOUNTER
Patient is down to two tabs.  Please review.  Controlled Substance Refill Request  Medication Name:   Requested Prescriptions     Pending Prescriptions Disp Refills     dextroamphetamine-amphetamine (ADDERALL XR) 20 MG 24 hr capsule [Pharmacy Med Name: Amphetamine-Dextroamphet ER Oral Capsule Extended Release 24 Hour 20 MG] 30 capsule 0     Sig: TAKE ONE CAPSULE BY MOUTH ONE TIME DAILY     Date Last Fill: 4/10/19  [facility]      Submit electronically to pharmacy  Controlled Substance Agreement Date Scanned:   Encounter-Level CSA Scan Date:    There are no encounter-level csa scan date.       Last office visit with prescriber/PCP: 4/2/2019 Giles Méndez MD OR same dept: 4/2/2019 Giles Méndez MD OR same specialty: 4/2/2019 Giles Méndez MD  Last physical: Visit date not found Last MTM visit: Visit date not found

## 2021-05-29 NOTE — TELEPHONE ENCOUNTER
Who is calling:  Patient   Reason for Call:  Patient out of medication.  RX was increased from 50 mg to 100 mg daily sat 4/2/19 OV with MD,  please send refill and advise patient.   Date of last appointment with primary care:  4/2/19   Has the patient been recently seen:  Yes  Okay to leave a detailed message: Yes              PLAN:  Patient Instructions   Follow up in 6 months for CSA and UDS renewal     Check BMP     STOP POTASSIUM     Provided the potassium is normal then we will increase the losartan to 100 mg daily  After increase in losartan then nurse BP check in one week and appt in 3 weeks

## 2021-05-29 NOTE — TELEPHONE ENCOUNTER
Left message to call back for: Losartan 100 mg Refill  Information to relay to patient:  Left detailed message for patient that Marti Hernandez CNP has sent Losartan refill to [facility] pharmacy.  Pt to call back with any further questions or concerns.

## 2021-05-29 NOTE — TELEPHONE ENCOUNTER
Controlled Substance Refill Request  Medication:   Requested Prescriptions     Pending Prescriptions Disp Refills     dextroamphetamine-amphetamine (ADDERALL XR) 20 MG 24 hr capsule [Pharmacy Med Name: Amphetamine-Dextroamphet ER Oral Capsule Extended Release 24 Hour 20 MG] 30 capsule 0     Sig: TAKE ONE CAPSULE BY MOUTH ONE TIME DAILY.     Date Last Fill: 5/10/19  [facility]   Submit electronically to pharmacy    Controlled Substance Agreement on File:   Encounter-Level CSA Scan Date:    There are no encounter-level csa scan date.       Last office visit with primary: 4/2/2019

## 2021-05-30 NOTE — TELEPHONE ENCOUNTER
Controlled Substance Refill Request  Medication:   Requested Prescriptions     Pending Prescriptions Disp Refills     dextroamphetamine-amphetamine (ADDERALL XR) 20 MG 24 hr capsule [Pharmacy Med Name: Amphetamine-Dextroamphet ER Oral Capsule Extended Release 24 Hour 20 MG] 30 capsule 0     Sig: TAKE ONE CAPSULE BY MOUTH ONE TIME DAILY.     Date Last Fill: 6/12/19  [facility]   Submit electronically to pharmacy  Controlled Substance Agreement on File:   Encounter-Level CSA Scan Date:    There are no encounter-level csa scan date.       Last office visit: Last office visit pertaining to requested medication was 4/2/19.

## 2021-05-30 NOTE — TELEPHONE ENCOUNTER
Patient stated he only has 1 pill left.      Controlled Substance Refill Request  Medication Name:   Requested Prescriptions     Pending Prescriptions Disp Refills     dextroamphetamine-amphetamine (ADDERALL XR) 20 MG 24 hr capsule 30 capsule 0     Sig: Take 1 capsule (20 mg total) by mouth daily.     Date Last Fill: 6/12/19  [facility]      Submit electronically to pharmacy  Controlled Substance Agreement Date Scanned:   Encounter-Level CSA Scan Date:    There are no encounter-level csa scan date.       Last office visit with prescriber/PCP: 4/2/2019 Giles Méndez MD OR same dept: 4/2/2019 Giles Méndez MD OR same specialty: 4/2/2019 Giles Méndez MD  Last physical: Visit date not found Last MTM visit: Visit date not found

## 2021-05-31 VITALS — WEIGHT: 315 LBS | BODY MASS INDEX: 40.87 KG/M2

## 2021-05-31 NOTE — TELEPHONE ENCOUNTER
Controlled Substance Refill Request  Medication:   Requested Prescriptions     Pending Prescriptions Disp Refills     dextroamphetamine-amphetamine (ADDERALL XR) 20 MG 24 hr capsule [Pharmacy Med Name: Amphetamine-Dextroamphet ER Oral Capsule Extended Release 24 Hour 20 MG] 30 capsule 0     Sig: TAKE ONE CAPSULE BY MOUTH ONE TIME DAILY     Date Last Fill: 7/17/19  [facility]   Submit electronically to pharmacy  Controlled Substance Agreement on File:   Encounter-Level CSA Scan Date:    There are no encounter-level csa scan date.       Last office visit: Last office visit pertaining to requested medication was 4/2/19.

## 2021-05-31 NOTE — TELEPHONE ENCOUNTER
DARIN boateng Pt at 7:37 am, relayed that Rx was approved and that he will be due for a med check in October 2019.  WENDY DavidA

## 2021-06-01 VITALS — WEIGHT: 315 LBS | BODY MASS INDEX: 39.42 KG/M2

## 2021-06-01 VITALS — BODY MASS INDEX: 40.52 KG/M2 | WEIGHT: 315 LBS

## 2021-06-01 NOTE — PROGRESS NOTES
DIAGNOSIS:  1. LINDA (obstructive sleep apnea)                          The diagnosis was confirmed.  The condition is stable/controlled on CPAP  and no side effects  have been noted.  The appropriate follow up labs  ( NA )have been ordered  (OR ARE UP TO DATE) and medication refills ordered if requested.      2. Hypertension                          The diagnosis was confirmed.  The condition is stable/controlled on LOSARTAN and no side effects  have been noted.  The appropriate follow up labs  ( UTD )have been ordered  (OR ARE UP TO DATE) and medication refills ordered if requested.   losartan (COZAAR) 100 MG tablet   3. ADHD (attention deficit hyperactivity disorder)                         The diagnosis was confirmed.  The condition is stable/controlled on ADDERALL XR and no side effects  have been noted.  The appropriate follow up labs  ( UDS )have been ordered  (OR ARE UP TO DATE) and medication refills ordered if requested.    dextroamphetamine-amphetamine (ADDERALL XR) 20 MG 24 hr capsule    Drug Abuse 1+, Urine   4. Controlled substance agreement signed  Drug Abuse 1+, Urine       PLAN:     Update CSA and UDS    Monitor home BPs    Follow up in 6 months.    Adderall XR refilled      The following high BMI interventions were performed this visit: encouragement to exercise and dietary management education, guidance, and counseling        HPI:  Continues to have benefit from the Adderall.  No palpitations, insomnia, or anorexia.  Takes losartan daily upon awakening.  Uses CPAP daily.  Working on weight loss.          Current Outpatient Medications on File Prior to Visit   Medication Sig Dispense Refill     [DISCONTINUED] dextroamphetamine-amphetamine (ADDERALL XR) 20 MG 24 hr capsule TAKE ONE CAPSULE BY MOUTH ONE TIME DAILY  30 capsule 0     [DISCONTINUED] losartan (COZAAR) 100 MG tablet Take 1 tablet (100 mg total) by mouth daily. 90 tablet 1     No current facility-administered medications on file prior to  "visit.        Pmh: reviewed  Psh: reviewed  Allergy:  reviewed      EXAM:    /87   Pulse 95   Temp 97.3  F (36.3  C) (Oral)   Resp 20   Ht 6' 3\" (1.905 m)   Wt (!) 319 lb 3.2 oz (144.8 kg)   BMI 39.90 kg/m     Home weight 305-309   GEN:   ALERT, NAD, ORIENTED TIMES THREE  NECK: SUPPLE WITHOUT ADENOPATHY OR THYROMEGALY  LUNGS: CTA  COR: RRR WITHOUT MURMUR  SKIN: NO RASH , ULCERS OR LESIONS NOTED  EXT: WITHOUT EDEMA/SWELLING    No results found for this or any previous visit (from the past 168 hour(s)).  Results for orders placed or performed in visit on 04/02/19   Basic Metabolic Panel   Result Value Ref Range    Sodium 140 136 - 145 mmol/L    Potassium 4.1 3.5 - 5.0 mmol/L    Chloride 104 98 - 107 mmol/L    CO2 25 22 - 31 mmol/L    Anion Gap, Calculation 11 5 - 18 mmol/L    Glucose 113 70 - 125 mg/dL    Calcium 9.8 8.5 - 10.5 mg/dL    BUN 17 8 - 22 mg/dL    Creatinine 0.99 0.70 - 1.30 mg/dL    GFR MDRD Af Amer >60 >60 mL/min/1.73m2    GFR MDRD Non Af Amer >60 >60 mL/min/1.73m2       Lab Results   Component Value Date    HGBA1C 5.3 04/02/2019          "

## 2021-06-02 ENCOUNTER — COMMUNICATION - HEALTHEAST (OUTPATIENT)
Dept: FAMILY MEDICINE | Facility: CLINIC | Age: 41
End: 2021-06-02

## 2021-06-02 VITALS — BODY MASS INDEX: 39.1 KG/M2 | WEIGHT: 312.8 LBS

## 2021-06-02 VITALS — BODY MASS INDEX: 39.6 KG/M2 | WEIGHT: 315 LBS

## 2021-06-02 VITALS — BODY MASS INDEX: 37.75 KG/M2 | WEIGHT: 302 LBS

## 2021-06-02 DIAGNOSIS — F90.9 ADHD (ATTENTION DEFICIT HYPERACTIVITY DISORDER): ICD-10-CM

## 2021-06-02 NOTE — TELEPHONE ENCOUNTER
Controlled Substance Refill Request  Medication:   Requested Prescriptions     Pending Prescriptions Disp Refills     ADDERALL XR 20 mg 24 hr capsule [Pharmacy Med Name: Adderall XR Oral Capsule Extended Release 24 Hour 20 MG] 30 capsule 0     Sig: TAKE ONE CAPSULE BY MOUTH ONE TIME DAILY     Date Last Fill: 9.17.19  [facility]   Submit electronically to pharmacy  Controlled Substance Agreement on File:   Encounter-Level CSA Scan Date:    There are no encounter-level csa scan date.       Last office visit: Last office visit pertaining to requested medication was 9.17.19, says good for 6 months.

## 2021-06-02 NOTE — TELEPHONE ENCOUNTER
Controlled Substance Refill Request  Medication:   Requested Prescriptions     Pending Prescriptions Disp Refills     ADDERALL XR 20 mg 24 hr capsule [Pharmacy Med Name: Adderall XR Oral Capsule Extended Release 24 Hour 20 MG] 30 capsule 0     Sig: TAKE ONE CAPSULE BY MOUTH ONE TIME DAILY     Date Last Fill: 9/17/19  [facility]: [facility]   Submit electronically to pharmacy  Controlled Substance Agreement on File:   Encounter-Level CSA Scan Date:    There are no encounter-level csa scan date.       Last office visit: 9/17/2019 Giles Méndez MD Leslie White, RN BSBA [facility] Triage/Med Refill 10/16/2019 6:20 PM

## 2021-06-03 VITALS
DIASTOLIC BLOOD PRESSURE: 87 MMHG | WEIGHT: 315 LBS | RESPIRATION RATE: 20 BRPM | HEIGHT: 75 IN | TEMPERATURE: 97.3 F | BODY MASS INDEX: 39.17 KG/M2 | HEART RATE: 95 BPM | SYSTOLIC BLOOD PRESSURE: 138 MMHG

## 2021-06-03 NOTE — TELEPHONE ENCOUNTER
Controlled Substance Refill Request  Medication:   Requested Prescriptions     Pending Prescriptions Disp Refills     ADDERALL XR 20 mg 24 hr capsule [Pharmacy Med Name: Adderall XR Oral Capsule Extended Release 24 Hour 20 MG] 30 capsule 0     Sig: TAKE ONE CAPSULE BY MOUTH ONE TIME DAILY     Date Last Fill: 10.18.19  [facility]: [facility]   Submit electronically to pharmacy  Controlled Substance Agreement on File:   Encounter-Level CSA Scan Date:    There are no encounter-level csa scan date.       Last office visit: Last office visit pertaining to requested medication was 9.17.19.

## 2021-06-03 NOTE — TELEPHONE ENCOUNTER
Patient stated he is out and would like this refill sent in today.    Controlled Substance Refill Request  Medication Name:   Requested Prescriptions     Pending Prescriptions Disp Refills     ADDERALL XR 20 mg 24 hr capsule [Pharmacy Med Name: Adderall XR Oral Capsule Extended Release 24 Hour 20 MG] 30 capsule 0     Sig: TAKE ONE CAPSULE BY MOUTH ONE TIME DAILY     Date Last Fill: 10/18/19  [facility]      Submit electronically to pharmacy  Controlled Substance Agreement Date Scanned:   Encounter-Level CSA Scan Date:    There are no encounter-level csa scan date.       Last office visit with prescriber/PCP: 9/17/2019 Giles Méndez MD OR same dept: 9/17/2019 Giles Méndez MD OR same specialty: 9/17/2019 Giles Méndez MD  Last physical: Visit date not found Last MTM visit: Visit date not found

## 2021-06-04 NOTE — TELEPHONE ENCOUNTER
Patient stated that he only has 1 pill left and is hoping to get refill sent over.     Controlled Substance Refill Request  Medication Name:   Requested Prescriptions     Pending Prescriptions Disp Refills     dextroamphetamine-amphetamine (ADDERALL XR) 20 MG 24 hr capsule 30 capsule 0     Sig: Take 1 capsule (20 mg total) by mouth daily.     Date Last Fill: 11/18/19  Pharmacy: [facility]       Submit electronically to pharmacy  Controlled Substance Agreement Date Scanned:   Encounter-Level CSA Scan Date:    There are no encounter-level csa scan date.       Last office visit with prescriber/PCP: 9/17/2019 Giles Méndez MD OR same dept: 9/17/2019 Giles Méndez MD OR same specialty: 9/17/2019 Giles Méndez MD  Last physical: Visit date not found Last [facility] visit: Visit date not found

## 2021-06-04 NOTE — TELEPHONE ENCOUNTER
Controlled Substance Refill Request  Medication:   Requested Prescriptions     Pending Prescriptions Disp Refills     ADDERALL XR 20 mg 24 hr capsule [Pharmacy Med Name: Adderall XR Oral Capsule Extended Release 24 Hour 20 MG] 30 capsule 0     Sig: TAKE ONE CAPSULE BY MOUTH ONE TIME DAILY     Date Last Fill: 10/18/19  [facility]    Submit electronically to pharmacy  Controlled Substance Agreement on File:   Encounter-Level CSA Scan Date:    There are no encounter-level csa scan date.       Last office visit: Last office visit pertaining to requested medication was 9/17/19.

## 2021-06-04 NOTE — TELEPHONE ENCOUNTER
Medication: Adderall    Last Date Filled 10/18/19     pulled: NO  Unable to pull , no acess  Only PCP Prescribing?: Unknown    Taken as prescribed from physician notes?: YES  Update CSA and UDS     Monitor home BPs     Follow up in 6 months.     Adderall XR refilled       CSA in last year: Yes, 09/17/19    Random Utox in last year: Yes, 09/17/19    Opioids + benzodiazepines? NO

## 2021-06-05 VITALS
TEMPERATURE: 97.1 F | DIASTOLIC BLOOD PRESSURE: 92 MMHG | HEART RATE: 79 BPM | SYSTOLIC BLOOD PRESSURE: 148 MMHG | RESPIRATION RATE: 24 BRPM | BODY MASS INDEX: 39.17 KG/M2 | HEIGHT: 75 IN | WEIGHT: 315 LBS

## 2021-06-05 NOTE — TELEPHONE ENCOUNTER
Controlled Substance Refill Request  Medication Name:   Requested Prescriptions     Pending Prescriptions Disp Refills     dextroamphetamine-amphetamine (ADDERALL XR) 20 MG 24 hr capsule 30 capsule 0     Sig: Take 1 capsule (20 mg total) by mouth daily.     Date Last Fill: 12/24/2019  [facility]  Submit electronically to pharmacy  Controlled Substance Agreement on file:   Encounter-Level CSA Scan Date:    There are no encounter-level csa scan date.        Last office visit:  12/24/19

## 2021-06-05 NOTE — TELEPHONE ENCOUNTER
FYI - Status Update  Who is Calling: Patient  Update: Patient is leaving town and requesting this to be refilled early to bring his medication along with him.  Okay to leave a detailed message?:  Yes

## 2021-06-06 NOTE — TELEPHONE ENCOUNTER
Controlled Substance Refill Request  Medication Name:   Requested Prescriptions     Pending Prescriptions Disp Refills     dextroamphetamine-amphetamine (ADDERALL XR) 20 MG 24 hr capsule 30 capsule 0     Sig: Take 1 capsule (20 mg total) by mouth daily.     Date Last Fill: 1/22/20  Is patient out of medication?: No, Two days left  Patient notified refills processed within 3 business days:  Yes  [facility]  [facility] electronically to pharmacy  Controlled Substance Agreement on file:   Encounter-Level CSA Scan Date:    There are no encounter-level csa scan date.        Last office visit:  9/17/19.  Patient is aware that he needs a med check mid-March.

## 2021-06-07 NOTE — PROGRESS NOTES
"Michael Cunningham is a 39 y.o. male who is being evaluated via a billable telephone visit.      The patient has been notified of following:     \"This telephone visit will be conducted via a call between you and your physician/provider. We have found that certain health care needs can be provided without the need for a physical exam.  This service lets us provide the care you need with a short phone conversation.  If a prescription is necessary we can send it directly to your pharmacy.  If lab work is needed we can place an order for that and you can then stop by our lab to have the test done at a later time.    If during the course of the call the physician/provider feels a telephone visit is not appropriate, you will not be charged for this service.\"     Patient has given verbal consent to a Telephone visit? Yes    Michael Cunningham complains of    Chief Complaint   Patient presents with     Medication Management     Discuss increasing Adderall dosage       I have reviewed and updated the patient's Past Medical History, Social History, Family History and Medication List.    Wt Readings from Last 3 Encounters:   09/17/19 (!) 319 lb 3.2 oz (144.8 kg)   04/02/19 (!) 316 lb 12.8 oz (143.7 kg)   11/16/18 (!) 302 lb (137 kg)    There is no height or weight on file to calculate BMI.  BP Readings from Last 3 Encounters:   09/17/19 138/87   04/02/19 (!) 141/92   11/16/18 (!) 137/92      ALLERGIES  Sulfa (sulfonamide antibiotics)    HPI:   Med check.  BPs when checked in the 120/85 range at home.  Has been noticing that it is not working as well.  Doesn't seem like he has the sharpness that he used to.  Sometimes thoughts get scattered and sometimes forgets things.   Has noticed this for 4-5 months.  Takes the Adderall XR daily.      Assessment/Plan:  1. Controlled substance agreement signed    2. Hypertension  - Basic Metabolic Panel; Future    3. Pre-diabetes  - Glycosylated Hemoglobin A1c; Future    4. Health care " maintenance  - Lipid Profile; Future    5. Prediabetes    PLAN   Follow up on or after 6-1-20 for fasting labs    Clinic appt in June 2020.       Phone call duration:  13:30 minutes

## 2021-06-07 NOTE — TELEPHONE ENCOUNTER
Controlled Substance Refill Request  Medication Name:   Requested Prescriptions     Pending Prescriptions Disp Refills     dextroamphetamine-amphetamine (ADDERALL XR) 20 MG 24 hr capsule 30 capsule 0     Sig: Take 1 capsule (20 mg total) by mouth daily.     Date Last Fill: 02/24/20  [facility]  [facility] electronically to [facility]  Controlled Substance Agreement on file:   Encounter-Level CSA Scan Date:    There are no encounter-level csa scan date.        Last office visit:  09/17/19

## 2021-06-07 NOTE — TELEPHONE ENCOUNTER
Controlled Substance Refill Request  Medication Name:   Requested Prescriptions     Pending Prescriptions Disp Refills     dextroamphetamine-amphetamine (ADDERALL XR) 20 MG 24 hr capsule 30 capsule 0     Sig: Take 1 capsule (20 mg total) by mouth daily.     Date Last Fill: 3/25/2020  Is patient out of medication?: No, 4 days left  Patient notified refills processed within 3 business days:  Yes  [facility]  [facility] electronically to pharmacy  Controlled Substance Agreement on file:   Encounter-Level CSA Scan Date:    There are no encounter-level csa scan date.        Last office visit:  4/3/2020

## 2021-06-07 NOTE — TELEPHONE ENCOUNTER
LMTCB. Please ask patient if he can switch his appt to a telephone visit with Dr. Méndez and if he can move his appointment to an opening on 4/2/20 or 4/3/20.

## 2021-06-07 NOTE — TELEPHONE ENCOUNTER
Patient Returning Call  Reason for call:  Returning call   Information relayed to patient:  Changing ofv to phone visit, patient would like to do that, please change to phone visit as care connection cannot   Patient has additional questions:  No  If YES, what are your questions/concerns:    Okay to leave a detailed message?: No

## 2021-06-08 NOTE — TELEPHONE ENCOUNTER
RN cannot approve Refill Request    RN can NOT refill this medication PCP messaged that patient is overdue for Labs. Last office visit: 9/17/2019 Giles Méndez MD Last Physical: Visit date not found Last MTM visit: Visit date not found Last visit same specialty: 9/17/2019 Giles Méndez MD.  Next visit within 3 mo: Visit date not found  Next physical within 3 mo: Visit date not found      Shira Guevara, [facility] Triage/Med Refill 6/7/2020    Requested Prescriptions   Pending Prescriptions Disp Refills     losartan (COZAAR) 100 MG tablet [Pharmacy Med Name: Losartan Potassium Oral Tablet 100 MG] 90 tablet 0     Sig: TAKE ONE TABLET BY MOUTH ONE TIME DAILY       Angiotensin Receptor Blocker Protocol Failed - 6/3/2020  6:56 PM        Failed - Serum potassium within the past 12 months     No results found for: LN-POTASSIUM          Failed - Serum creatinine within the past 12 months     Creatinine   Date Value Ref Range Status   04/02/2019 0.99 0.70 - 1.30 mg/dL Final             Passed - PCP or prescribing provider visit in past 12 months       Last office visit with prescriber/PCP: 9/17/2019 Giles Méndez MD OR same dept: 9/17/2019 Giles Méndez MD OR same specialty: 9/17/2019 Giles Méndez MD  Last physical: Visit date not found Last MTM visit: Visit date not found   Next visit within 3 mo: Visit date not found  Next physical within 3 mo: Visit date not found  Prescriber OR PCP: Giles Méndez MD  Last diagnosis associated with med order: 1. Hypertension  - losartan (COZAAR) 100 MG tablet [Pharmacy Med Name: Losartan Potassium Oral Tablet 100 MG]; TAKE ONE TABLET BY MOUTH ONE TIME DAILY   Dispense: 90 tablet; Refill: 0    If protocol passes may refill for 12 months if within 3 months of last provider visit (or a total of 15 months).             Passed - Blood pressure filed in past 12 months     BP Readings from Last 1 Encounters:   09/17/19 138/87

## 2021-06-08 NOTE — TELEPHONE ENCOUNTER
FYI - Status Update  Who is Calling: Patient  Update: Patient stated he accidentally threw his losartan away and would like another refill.  Okay to leave a detailed message?:  No

## 2021-06-08 NOTE — TELEPHONE ENCOUNTER
Controlled Substance Refill Request  Medication Name:   Requested Prescriptions     Pending Prescriptions Disp Refills     dextroamphetamine-amphetamine (ADDERALL XR) 20 MG 24 hr capsule 30 capsule 0     Sig: Take 1 capsule (20 mg total) by mouth daily.     Date Last Fill: 4/27/2020  [id number]  Submit electronically to pharmacy  Controlled Substance Agreement on file:   Encounter-Level CSA Scan Date:    There are no encounter-level csa scan date.        Last office visit:  4/3/2020

## 2021-06-08 NOTE — TELEPHONE ENCOUNTER
FYI - Status Update  Who is Calling: Patient  Update: Patient was made aware he is due for a lab appointment. Patient stated he will call back to schedule the lab appointment.  Okay to leave a detailed message?:  No return call needed

## 2021-06-08 NOTE — TELEPHONE ENCOUNTER
Medication: ADDERALL     Last Date Filled 4/27/2020     pulled: NO  Do not have access to  for this provider    Only PCP Prescribing?: YES    Taken as prescribed from physician notes?: YES      CSA in last year: YES 9/18/2019    Random Utox in last year: YES 9/17/2019    Opioids + benzodiazepines? NO

## 2021-06-09 NOTE — TELEPHONE ENCOUNTER
Let's just plan on repeating the labs fasting in 3 months.  In the meantime avoid carbs and work on weight loss.

## 2021-06-09 NOTE — TELEPHONE ENCOUNTER
Controlled Substance Refill Request  Medication Name:   Requested Prescriptions     Pending Prescriptions Disp Refills     ADDERALL XR 20 mg 24 hr capsule [Pharmacy Med Name: Adderall XR Oral Capsule Extended Release 24 Hour 20 MG] 30 capsule 0     Sig: Take 1 capsule (20 mg total) by mouth daily.     Date Last Fill: 6/2/20  Is patient out of medication?:  Yes  Patient notified refills processed within 3 business days:  Yes  Requested Pharm[facility]: [facility]  Submit electronically to pharmacy  Controlled Substance Agreement on file:   Encounter-Level CSA Scan Date:    There are no encounter-level csa scan date.        Last office visit:  4/3/20

## 2021-06-09 NOTE — TELEPHONE ENCOUNTER
Who is calling:  Patient   Reason for Call:  Patient states he had lab work done  on 06/29/20 for BMP,Glycosylated Hemoglobin, and Lipid Profile  he informed lab person who draw his blood that he was not fasting for 8 hours patient sates in his  Lab results letter  it states patient was fasting 8 hrs  patient would like to update provider that he was not fasting for that lab draw and asking if  he need to come for recheck  . Please advise   Date of last appointment with primary care: 04/03/20  Okay to leave a detailed message: No

## 2021-06-10 NOTE — TELEPHONE ENCOUNTER
Medication: Adderall XR 20 mg     Last Date Filled 07/08/20     pulled: NO, [place] not authorized    Only PCP Prescribing?: [id number]    Taken as prescribed from physician notes?: unknown   1. Controlled substance agreement signed     2. Hypertension  - Basic Metabolic Panel; Future     3. Pre-diabetes  - Glycosylated Hemoglobin A1c; Future     4. Health care maintenance  - Lipid Profile; Future     5. Prediabetes     PLAN   Follow up on or after 6-1-20 for fasting labs     Clinic appt in June 2020.      CSA in last year: YES    Random Utox in last year: YES    Opioids + benzodiazepines? NO

## 2021-06-10 NOTE — PROGRESS NOTES
HPI:   continuing to use CPAP at night.   No recent Tdap and continues work as a [employer].      Current Outpatient Prescriptions on File Prior to Visit   Medication Sig Dispense Refill     triamterene-hydrochlorothiazide (MAXZIDE-25) 37.5-25 mg per tablet Take 2 tablets by mouth daily. You are due for a medication check appointment and labs in February. Call 24/7 to setup 180 tablet 0     No current facility-administered medications on file prior to visit.        Pmh: reviewed  Psh: reviewed  Allergy:  reviewed      EXAM:    /88  Pulse 80  Resp 16  Wt (!) 327 lb (148.3 kg)  BMI 40.87 kg/m2   BP Readings from Last 3 Encounters:   05/18/17 138/88   03/17/16 130/82   02/26/16 138/84      GEN:   ALERT, NAD, ORIENTED TIMES THREE  NECK: SUPPLE WITHOUT ADENOPATHY OR THYROMEGALY  LUNGS: CTA  COR: RRR WITHOUT MURMUR  EXT: WITHOUT EDEMA OR SWELLING    Lab Results   Component Value Date    CHOL 146 02/26/2016     Lab Results   Component Value Date    HDL 30 (L) 02/26/2016     Lab Results   Component Value Date    LDLCALC 66 02/26/2016     Lab Results   Component Value Date    TRIG 248 (H) 02/26/2016     Lab Results   Component Value Date    HGBA1C 5.4 02/26/2016       No components found for: CHOLHDL      No results found for this or any previous visit (from the past 168 hour(s)).    DIAGNOSIS:  1. Obstructive Sleep Apnea , regular use of CPAP    2. Essential hypertension, controlled Basic Metabolic Panel   3. Obesity     4. Health care maintenance  Tdap vaccine,  8yo or older,  IM       PLAN:  Patient Instructions   The following high BMI interventions were performed this visit: encouragement to exercise and dietary management education, guidance, and counseling      Non-fasting labs    Tdap today

## 2021-06-10 NOTE — TELEPHONE ENCOUNTER
Controlled Substance Refill Request  Medication Name:   Requested Prescriptions     Pending Prescriptions Disp Refills     ADDERALL XR 20 mg 24 hr capsule [Pharmacy Med Name: Adderall XR Oral Capsule Extended Release 24 Hour 20 MG] 30 capsule 0     Sig: Take 1 capsule (20 mg) by mouth daily.     Date Last Fill: 7/8/20  [facility]  Submit electronically to pharmacy  Controlled Substance Agreement on file:   Encounter-Level CSA Scan Date:    There are no encounter-level csa scan date.        Last office visit:  4/3/20

## 2021-06-11 NOTE — PROGRESS NOTES
"Michael Cunningham is a 40 y.o. male who is being evaluated via a billable telephone visit.      The patient has been notified of following:     \"This telephone visit will be conducted via a call between you and your physician/provider. We have found that certain health care needs can be provided without the need for a physical exam.  This service lets us provide the care you need with a short phone conversation.  If a prescription is necessary we can send it directly to your pharmacy.  If lab work is needed we can place an order for that and you can then stop by our lab to have the test done at a later time.    Telephone visits are billed at different rates depending on your insurance coverage. During this emergency period, for some insurers they may be billed the same as an in-person visit.  Please reach out to your insurance provider with any questions.    If during the course of the call the physician/provider feels a telephone visit is not appropriate, you will not be charged for this service.\"    Patient has given verbal consent to a Telephone visit? Yes    What phone number would you like to be contacted at? [phone]    Patient would like to receive their AVS by AVS Preference: Haritha.    HPI:  Using Adderall daily, helps with focus.  No  Insomnia, anorexia orpalpitations.  Working well.  Using CPAP for 10-12 years.  [facility]eds [facility] follow up with the [facility].    Assessment/Plan:  1. Controlled substance agreement signed    - Drug Abuse 1+, Urine    2. LINDA (obstructive sleep apnea)    - Ambulatory referral to Sleep Medicine    3. Hypertriglyceridemia      PLAN:   Fasting labs  When you come in for the urine drug screen    CSA  To be signed when you come in for labs    Referral to the Sleep clinic.         Phone call duration:  6 1/2  minutes    "

## 2021-06-11 NOTE — PATIENT INSTRUCTIONS - HE
Fasting labs  When you come in for the urine drug screen    CSA  To be signed when you come in for labs    Referral to the Sleep clinic.

## 2021-06-11 NOTE — TELEPHONE ENCOUNTER
Controlled Substance Refill Request  Medication Name:   Requested Prescriptions     Pending Prescriptions Disp Refills     ADDERALL XR 20 mg 24 hr capsule [Pharmacy Med Name: Adderall XR Oral Capsule Extended Release 24 Hour 20 MG] 30 capsule 0     Sig: Take 1 capsule by mouth daily.     Date Last Fill: 8/7/20  [facility]  Submit electronically to pharmacy  Controlled Substance Agreement on file:   Encounter-Level CSA Scan Date:    There are no encounter-level csa scan date.        Last office visit:  4/3/20

## 2021-06-12 NOTE — TELEPHONE ENCOUNTER
Controlled Substance Refill Request  Medication Name:   Requested Prescriptions     Pending Prescriptions Disp Refills     ADDERALL XR 20 mg 24 hr capsule [Pharmacy Med Name: Adderall XR Oral Capsule Extended Release 24 Hour 20 MG] 30 capsule 0     Sig: TAKE ONE CAPSULE BY MOUTH ONE TIME DAILY     Date Last Fill: 9/9/20  [facility]  Submit electronically to pharmacy  Controlled Substance Agreement on file:   Encounter-Level CSA Scan Date:    There are no encounter-level csa scan date.        Last office visit:  9/15/20

## 2021-06-13 NOTE — TELEPHONE ENCOUNTER
Controlled Substance Refill Request  Medication Name:   Requested Prescriptions     Pending Prescriptions Disp Refills     ADDERALL XR 20 mg 24 hr capsule [Pharmacy Med Name: Adderall XR Oral Capsule Extended Release 24 Hour 20 MG] 30 capsule 0     Sig: TAKE ONE CAPSULE BY MOUTH ONE TIME DAILY     Date Last Fill: 11/6/20  [facility]  Submit electronically to pharmacy  Controlled Substance Agreement on file:   Encounter-Level CSA Scan Date:    There are no encounter-level csa scan date.        Last office visit:  9/15/20

## 2021-06-13 NOTE — TELEPHONE ENCOUNTER
Refill Approved    Rx renewed per Medication Renewal Policy. Medication was last renewed on 9/15/20vv.    Claudine Wright, [facility] Triage/Med Refill 12/1/2020     Requested Prescriptions   Pending Prescriptions Disp Refills     losartan (COZAAR) 100 MG tablet [Pharmacy Med Name: Losartan Potassium Oral Tablet 100 MG] 90 tablet 0     Sig: TAKE 1 TABLET BY MOUTH ONE TIME DAILY.       Angiotensin Receptor Blocker Protocol Failed - 11/30/2020 11:23 AM        Failed - Blood pressure filed in past 12 months     BP Readings from Last 1 Encounters:   09/17/19 138/87             Passed - PCP or prescribing provider visit in past 12 months       Last office visit with prescriber/PCP: 9/17/2019 Giles Méndez MD OR same dept: Visit date not found OR same specialty: 9/17/2019 Giles Méndez MD  Last physical: Visit date not found Last MTM visit: Visit date not found   Next visit within 3 mo: Visit date not found  Next physical within 3 mo: Visit date not found  Prescriber OR PCP: Giles Méndez MD  Last diagnosis associated with med order: 1. Hypertension  - losartan (COZAAR) 100 MG tablet [Pharmacy Med Name: Losartan Potassium Oral Tablet 100 MG]; TAKE 1 TABLET BY MOUTH ONE TIME DAILY.  Dispense: 90 tablet; Refill: 0    If protocol passes may refill for 12 months if within 3 months of last provider visit (or a total of 15 months).             Passed - Serum potassium within the past 12 months     Lab Results   Component Value Date    Potassium 4.0 06/29/2020             Passed - Serum creatinine within the past 12 months     Creatinine   Date Value Ref Range Status   06/29/2020 1.14 0.70 - 1.30 mg/dL Final

## 2021-06-13 NOTE — TELEPHONE ENCOUNTER
Medication:   ADDERALL XR 20 mg 24 hr capsule 30 capsule         Last Date Filled 11/06/20 per epic     pulled: NO  Unable to pull  under provider  Only PCP Prescribing?: Unknown    Taken as prescribed from physician notes?: YES  Assessment/Plan:  1. Controlled substance agreement signed     - Drug Abuse 1+, Urine       CSA in last year: YES    Random Utox in last year: YES    Opioids + benzodiazepines? NO

## 2021-06-14 NOTE — TELEPHONE ENCOUNTER
RN cannot approve Refill Request    RN can NOT refill this medication med is not covered by policy/route to provider. Last office visit: 9/17/2019 Giles Méndez MD Last Physical: Visit date not found Last MTM visit: Visit date not found Last visit same specialty: 9/17/2019 Giles Méndez MD.  Next visit within 3 mo: Visit date not found  Next physical within 3 mo: Visit date not found      Grecia Ruelas, [facility] Triage/Med Refill 2/3/2021    Requested Prescriptions   Pending Prescriptions Disp Refills     ADDERALL XR 20 mg 24 hr capsule [Pharmacy Med Name: Adderall XR Oral Capsule Extended Release 24 Hour 20 MG] 30 capsule 0     Sig: TAKE ONE CAPSULE BY MOUTH ONE TIME DAILY       Controlled Substances Refill Protocol Failed - 2/3/2021  9:31 AM        Failed - Route all Controlled Substance Requests to Provider        Passed - Patient has controlled substance agreement in past 12 months     Encounter-Level CSA Scan Date:    There are no encounter-level csa scan date.               Passed - Visit with PCP or prescribing provider visit in past 12 months      Last office visit with prescriber/PCP: 9/17/2019 Giles Méndez MD OR same dept: Visit date not found OR same specialty: 9/17/2019 Giles Méndez MD Last physical: Visit date not found Last MTM visit: Visit date not found    Next visit within 3 mo: Visit date not found  Next physical within 3 mo: Visit date not found  Prescriber OR PCP: Giles Méndez MD  Last diagnosis associated with med order: 1. ADHD (attention deficit hyperactivity disorder)  - ADDERALL XR 20 mg 24 hr capsule [Pharmacy Med Name: Adderall XR Oral Capsule Extended Release 24 Hour 20 MG]; TAKE ONE CAPSULE BY MOUTH ONE TIME DAILY   Dispense: 30 capsule; Refill: 0

## 2021-06-14 NOTE — TELEPHONE ENCOUNTER
Controlled Substance Refill Request  Medication Name:   Requested Prescriptions     Pending Prescriptions Disp Refills     ADDERALL XR 20 mg 24 hr capsule [Pharmacy Med Name: Adderall XR Oral Capsule Extended Release 24 Hour 20 MG] 30 capsule 0     Sig: TAKE ONE CAPSULE BY MOUTH ONE TIME DAILY     Date Last Fill: 12/8/20  [facility]  Submit electronically to pharmacy  Controlled Substance Agreement on file:   Encounter-Level CSA Scan Date:    There are no encounter-level csa scan date.        Last office visit:  9/15/20

## 2021-06-15 NOTE — PATIENT INSTRUCTIONS - HE
Eliminate salt as much as possible    Nurse BP checks on 2 occasions in the next month. Consider addition of amlodipine.

## 2021-06-15 NOTE — TELEPHONE ENCOUNTER
Third Attempt: I mailed a letter to the patient to please call our office to schedule a med check appt. Closing until the patient responds. OK to schedule when the patient calls back.

## 2021-06-15 NOTE — TELEPHONE ENCOUNTER
Controlled Substance Refill Request  Medication Name:   Requested Prescriptions     Pending Prescriptions Disp Refills     ADDERALL XR 20 mg 24 hr capsule [Pharmacy Med Name: Adderall XR Oral Capsule Extended Release 24 Hour 20 MG] 30 capsule 0     Sig: TAKE ONE CAPSULE BY MOUTH ONE TIME DAILY     Date Last Fill: 2/6/21  [facility]  Submit electronically to pharmacy  Controlled Substance Agreement on file:   Encounter-Level CSA Scan Date:    There are no encounter-level csa scan date.        Last office visit:  2/23/21    ;

## 2021-06-15 NOTE — TELEPHONE ENCOUNTER
Medication:   ADDERALL XR 20 mg 24 hr capsule 30 capsule            Last Date Filled 01/07/21, per epic      pulled: NO  Unable to pull  under provider  Only PCP Prescribing?: Unknown     Taken as prescribed from physician notes?: YES  Assessment/Plan:  1. Controlled substance agreement signed     - Drug Abuse 1+, Urine        CSA in last year: YES     Random Utox in last year: YES     Opioids + benzodiazepines? NO

## 2021-06-15 NOTE — PROGRESS NOTES
"DIAGNOSIS:  1. Attention deficit hyperactivity disorder (ADHD), unspecified ADHD type, stable    2. Hypertension, borderline control losartan (COZAAR) 100 MG tablet   3. Controlled substance agreement signed     4. LINDA (obstructive sleep apnea)         PLAN:     Eliminate salt as much as possible    Nurse BP checks on 2 occasions in the next month. Consider addition of amlodipine.             HPI:  Took BP med about 20 min before appt and takes it with his morning Adderall.  Adderall is taken daily and continues to provide benefit.     Sleeping well on CPAP.         Current Outpatient Medications on File Prior to Visit   Medication Sig Dispense Refill     ADDERALL XR 20 mg 24 hr capsule TAKE ONE CAPSULE BY MOUTH ONE TIME DAILY 30 capsule 0     [DISCONTINUED] losartan (COZAAR) 100 MG tablet TAKE 1 TABLET BY MOUTH ONE TIME DAILY. 90 tablet 1     No current facility-administered medications on file prior to visit.        Pmh: reviewed  Psh: reviewed  Allergy:  reviewed      EXAM:    BP (!) 148/92   Pulse 79   Temp 97.1  F (36.2  C) (Oral)   Resp 24   Ht 6' 3\" (1.905 m)   Wt (!) 328 lb 9.6 oz (149.1 kg)   BMI 41.07 kg/m    GEN:   ALERT, NAD, ORIENTED TIMES THREE  NECK: SUPPLE WITHOUT ADENOPATHY OR THYROMEGALY  LUNGS: CTA  COR: RRR WITHOUT MURMUR  EXT: WITHOUT EDEMA/SWELLING  Results for orders placed or performed in visit on 06/29/20   Basic Metabolic Panel   Result Value Ref Range    Sodium 140 136 - 145 mmol/L    Potassium 4.0 3.5 - 5.0 mmol/L    Chloride 105 98 - 107 mmol/L    CO2 24 22 - 31 mmol/L    Anion Gap, Calculation 11 5 - 18 mmol/L    Glucose 96 70 - 125 mg/dL    Calcium 9.5 8.5 - 10.5 mg/dL    BUN 14 8 - 22 mg/dL    Creatinine 1.14 0.70 - 1.30 mg/dL    GFR MDRD Af Amer >60 >60 mL/min/1.73m2    GFR MDRD Non Af Amer >60 >60 mL/min/1.73m2     Lab Results   Component Value Date    TSH 1.33 09/22/2020     Lab Results   Component Value Date    HGBA1C 5.3 06/29/2020     Lab Results   Component Value Date    " CHOL 135 09/22/2020    CHOL 148 06/29/2020    CHOL 146 02/26/2016     Lab Results   Component Value Date    HDL 30 (L) 09/22/2020    HDL 28 (L) 06/29/2020    HDL 30 (L) 02/26/2016     Lab Results   Component Value Date    LDLCALC 59 09/22/2020    LDLCALC  06/29/2020      Comment:      Invalid, Triglycerides >400    LDLCALC 66 02/26/2016     Lab Results   Component Value Date    TRIG 228 (H) 09/22/2020    TRIG 568 (H) 06/29/2020    TRIG 248 (H) 02/26/2016     No components found for: CHOLHDL        No results found for this or any previous visit (from the past 168 hour(s)).

## 2021-06-15 NOTE — TELEPHONE ENCOUNTER
Left message to call back for: Rx refill  Information to relay to patient:  LMTCB, Please assist patient with scheduling appt.

## 2021-06-16 NOTE — TELEPHONE ENCOUNTER
Telephone Encounter by Patsy Downing MA at 2/24/2020  3:01 PM     Author: Patsy Downing MA Service: -- Author Type: Certified Medical Assistant    Filed: 2/24/2020  3:02 PM Encounter Date: 2/24/2020 Status: Signed    : Patsy Downing MA (Certified Medical Assistant)       Medication:   Requested Prescriptions     Pending Prescriptions Disp Refills   ? dextroamphetamine-amphetamine (ADDERALL XR) 20 MG 24 hr capsule 30 capsule 0     Sig: Take 1 capsule (20 mg total) by mouth daily.         Last Date Filled 1/23/2020     pulled: YES      Only PCP Prescribing?: YES    Taken as prescribed from physician notes?: YES  HPI:  Continues to have benefit from the Adderall.  No palpitations, insomnia, or anorexia.  Takes losartan daily upon awakening.  Uses CPAP daily.  Working on weight loss.    CSA in last year: YES    Random Utox in last year: YES    Opioids + benzodiazepines? NO

## 2021-06-16 NOTE — TELEPHONE ENCOUNTER
Telephone Encounter by Sara Wynn CMA at 10/17/2019  3:10 PM     Author: Sara Wynn CMA Service: -- Author Type: Certified Medical Assistant    Filed: 10/17/2019  3:13 PM Encounter Date: 10/15/2019 Status: Signed    : Sara Wynn CMA (Certified Medical Assistant)       Medication:   Requested Prescriptions     Pending Prescriptions Disp Refills   ? ADDERALL XR 20 mg 24 hr capsule [Pharmacy Med Name: Adderall XR Oral Capsule Extended Release 24 Hour 20 MG] 30 capsule 0     Sig: TAKE ONE CAPSULE BY MOUTH ONE TIME DAILY       Last Date Filled   Disp Refills Start End JOHN    dextroamphetamine-amphetamine (ADDERALL XR) 20 MG 24 hr capsule 30 capsule 0 9/17/2019  No   Sig - Route: Take 1 capsule (20 mg total) by mouth daily. - Oral   Sent to pharmacy as: dextroamphetamine-amphetamine (ADDERALL XR) 20 MG 24 hr capsule   Earliest Fill Date: 9/17/2019          pulled: YES       Only PCP Prescribing?: YES    Taken as prescribed from physician notes?: YES  3. ADHD (attention deficit hyperactivity disorder)                         The diagnosis was confirmed.  The condition is stable/controlled on ADDERALL XR and no side effects  have been noted.  The appropriate follow up labs  ( UDS )have been ordered  (OR ARE UP TO DATE) and medication refills ordered if requested.    dextroamphetamine-amphetamine (ADDERALL XR) 20 MG 24 hr capsule     Drug Abuse 1+, Urine   4. Controlled substance agreement signed  Drug Abuse 1+, Urine         PLAN:      Update CSA and UDS     Monitor home BPs     Follow up in 6 months.     Adderall XR refilled       CSA in last year: YES, 09/18/19    Random Utox in last year: YES   Ref Range & Units 9/17/19 1013      Amphetamines Screen Negative Screen Positive (Confirmation available on request)Abnormal      Benzodiazepines Screen Negative Screen Negative     Opiates Screen Negative Screen Negative     Phencyclidine Screen Negative Screen Negative     THC Screen Negative Screen Negative      Barbiturates Screen Negative Screen Negative     Cocaine Metabolite Screen Negative Screen Negative     Methadone Screen Negative Screen Negative     Oxycodone Screen Negative Screen Negative     Creatinine, Urine mg/dL 169.1          Opioids + benzodiazepines? NO

## 2021-06-16 NOTE — TELEPHONE ENCOUNTER
Telephone Encounter by Patsy Downing MA at 12/23/2019  1:47 PM     Author: Patsy Downing MA Service: -- Author Type: Certified Medical Assistant    Filed: 12/23/2019  1:49 PM Encounter Date: 12/23/2019 Status: Signed    : Patsy Downing MA (Certified Medical Assistant)       Medication:   Requested Prescriptions     Pending Prescriptions Disp Refills   ? dextroamphetamine-amphetamine (ADDERALL XR) 20 MG 24 hr capsule 30 capsule 0     Sig: Take 1 capsule (20 mg total) by mouth daily.         Last Date Filled 11/18/2019     pulled: YES      Only PCP Prescribing?: YES    Taken as prescribed from physician notes?: YES  HPI:  Continues to have benefit from the Adderall.  No palpitations, insomnia, or anorexia.    CSA in last year: YES    Random Utox in last year: YES    Opioids + benzodiazepines? NO

## 2021-06-16 NOTE — TELEPHONE ENCOUNTER
Telephone Encounter by Patsy Downing MA at 1/22/2020 11:20 AM     Author: Patsy Downing MA Service: -- Author Type: Certified Medical Assistant    Filed: 1/22/2020 11:24 AM Encounter Date: 1/22/2020 Status: Signed    : Patsy Downing MA (Certified Medical Assistant)       Medication:   Requested Prescriptions     Pending Prescriptions Disp Refills   ? dextroamphetamine-amphetamine (ADDERALL XR) 20 MG 24 hr capsule 30 capsule 0     Sig: Take 1 capsule (20 mg total) by mouth daily.         Last Date Filled 12/24/19     pulled: YES      Only PCP Prescribing?: NO    Taken as prescribed from physician notes?: YES  HPI:  Continues to have benefit from the Adderall.  No palpitations, insomnia, or anorexia.  Takes losartan daily upon awakening.  Uses CPAP daily.  Working on weight loss.       CSA in last year: YES    Random Utox in last year: YES    Opioids + benzodiazepines? NO

## 2021-06-16 NOTE — PROGRESS NOTES
Assessment:     1. Essential hypertension  triamterene-hydrochlorothiazide (MAXZIDE-25) 37.5-25 mg per tablet    Basic Metabolic Panel    Microalbumin, Random Urine   2. Cancer screening  Ambulatory referral to Dermatology   3. Pulmonary nodule  Ambulatory referral to Pulmonology   4. Hypokalemia  potassium chloride (KLOR-CON) 10 MEQ CR tablet     Discussed that 2 pills of triamterene hydrochlorothiazide combination (blood pressure medication) is not the usual protocol.    Addition of a second agent may have more beneficial effect.  Hydrochlorothiazide at higher doses does not provide additional blood pressure lowering and may have more side effects        Plan:     See patient instruction outlined in after visit summary, for detailed plan of care       The diagnosis was discussed with the patient and evaluation and treatment plans outlined.     Subjective:      Michael Cunningham is a  male who presents for evaluation of   Chief Complaint   Patient presents with     Medication Check     bp meds     Referral     Derm; cancer screening     Patient is here today for follow-up of his medications for blood pressure.  In addition he is concerned as his brother-in-law recently got diagnosed with stage IV non-Hodgkin's lymphoma.  He would like to be evaluated for any screening cancer options.  He is concerned about various sun spots on his forearms and would like to see dermatology.    He also has 2 stable small lipoma on his anterior chest and he wants me to examine them.  They have remained stable and have not changed in size or consistency.    He indicates that she is feeling well and   denies any symptoms referable to her elevated blood pressure.   Specifically denies chest pain, palpitations, dyspnea, orthopnea,   PND or peripheral edema    Review of chart shows that he has had follow-up of pulmonary nodule.  He has followed with pulmonology's and was supposed to follow-up in 1 year which he has not done.    Allergies    Allergen Reactions     Sulfa (Sulfonamide Antibiotics)        Current Outpatient Prescriptions on File Prior to Visit   Medication Sig Dispense Refill     [DISCONTINUED] potassium chloride (KLOR-CON) 10 MEQ CR tablet TAKE ONE TABLET BY MOUTH ONCE DAILY  90 tablet 1     [DISCONTINUED] triamterene-hydrochlorothiazide (MAXZIDE-25) 37.5-25 mg per tablet TAKE 2 TABLETS BY MOUTH ONCE DAILY  60 tablet 2     No current facility-administered medications on file prior to visit.        Patient Active Problem List   Diagnosis     Obstructive Sleep Apnea     Hypertension     Abnormal Blood Chemistry     Pulmonary nodule     Obesity       History reviewed. No pertinent past medical history.    Past Surgical History:   Procedure Laterality Date     nose          Family History   Problem Relation Age of Onset     No Medical Problems Mother      Heart disease Father      No Medical Problems Sister      Cancer Maternal Grandfather      smoker, lung cancer     Cancer Paternal Grandmother      Brain , 58 years      Cancer Paternal Grandfather      lung, cancer       Social History     Social History     Marital status:      Spouse name: N/A     Number of children: N/A     Years of education: N/A     Social History Main Topics     Smoking status: Former Smoker     Quit date: 3/17/2012     Smokeless tobacco: Current User     Types: Chew     Alcohol use None     Drug use: None     Sexual activity: Not Asked     Other Topics Concern     None     Social History Narrative    Pt is a , 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     Previous tobacco user, less than a pack a day for 15 years, quit 2012. Pt has a dog.          Review of Systems  Constitutional: negative  Respiratory: negative  Cardiovascular: negative  Gastrointestinal: negative       Objective:     BP (!) 134/96 (Patient Site: Left Arm, Patient Position: Sitting, Cuff Size: Adult Large)  Pulse 72  Temp 98.2  F (36.8  C) (Oral)   Resp  16  Wt (!) 324 lb 3 oz (147.1 kg)  BMI 40.52 kg/m2      GENERAL APPEARANCE:  Appearing stated age, smiling, alert, cooperative, and in no acute distress.   HEENT: Pupils equal, regular, react to light and accommodation. Extraocular muscles intact, fundi benign. Ear canals and tympanic membranes are normal. Lips, mouth, and throat are unremarkable.   NECK: Neck supple without adenopathy, thyromegaly or masses.   LYMPH: No anterior cervical or supraclavicular LN enlargement   PULMONARY: Normal respiratory effort. Chest is clear.   CARDIOVASCULAR: Heart auscultation: rhythm regular, heart sounds normal S1 and S2, bruit carotid artery absent.   SKIN: Warm and well perfused.  Multiple small lentiginous spots on both shoulders.  No suspicious growth or lesion seen.  There are 2 small 1-1.5 cm lipoma on the anterior chest one on the left lower chest and another on the right chest over the rib cage.  These are freely mobile.   ABDOMEN: Abdomen soft, non-tender. BS normal. No masses or organomegaly.   EXTREMITIES: Peripheral pulses are full. Extremities with no edema.   MENTAL STATUS: Alert, oriented and thought content appropriate   NEUROLOGIC: Station and gait normal, strength and movement normal, reflexes are normal and symmetric

## 2021-06-16 NOTE — TELEPHONE ENCOUNTER
Telephone Encounter by Cammie Dawkins LPN at 3/24/2020  2:50 PM     Author: Cammie Dawkins LPN Service: -- Author Type: Licensed Nurse    Filed: 3/24/2020  2:52 PM Encounter Date: 3/24/2020 Status: Signed    : Cammie Dawkins LPN (Licensed Nurse)       Medication: dextroamphetamine-amphetamine (ADDERALL XR) 20 MG 24 hr capsule    Last Date Filled: 02/25/20     pulled: YES      Only PCP Prescribing?: YES    Taken as prescribed from physician notes?: YES   ADHD (attention deficit hyperactivity disorder)                         The diagnosis was confirmed.  The condition is stable/controlled on ADDERALL XR and no side effects  have been noted.  The appropriate follow up labs  ( UDS )have been ordered  (OR ARE UP TO DATE) and medication refills ordered if requested.       CSA in last year: YES    Random Utox in last year: YES    Opioids + benzodiazepines? NO

## 2021-06-16 NOTE — PROGRESS NOTES
Follow Up Blood Pressure Check    Michael Cunningham is a 40 y.o. male recommended to follow up for blood pressure check by Giles Méndez MD. Anihypertensive medications and adherence were verified: Yes.     Reason for visit: High at last visit and new medication     Medication change at last visit: yes- no side effects. Pt mentioned he took the medication right before he came in.     Today's Vitals:   Vitals:    04/15/21 1359 04/15/21 1406   BP: (!) 158/98 (!) 150/95   Patient Site: Right Arm Right Arm   Patient Position: Sitting Sitting   Cuff Size: Adult Large Adult Large   Pulse: 88 93         Lowest blood pressure today is <180/<110 and they deny signs or symptoms of new onset: DENY, severe headache, fatigue, confusion, vision changes, chest pain, pounding in the chest, neck, ears, irregular heartbeat, difficulty breathing and blood in the urine.  Please inform patient of his/her blood pressure today.  If they are asymptomatic, the patient is to continue current medications.  This message will be routed to their provider, and they will be notified if a change in medication is recommended.        Geri Garcia    Current Outpatient Medications   Medication Sig Dispense Refill     ADDERALL XR 20 mg 24 hr capsule TAKE ONE CAPSULE BY MOUTH ONE TIME DAILY. 30 capsule 0     losartan (COZAAR) 100 MG tablet Take 1 tablet (100 mg total) by mouth daily. 90 tablet 3     No current facility-administered medications for this visit.

## 2021-06-16 NOTE — TELEPHONE ENCOUNTER
Telephone Encounter by Geri Garcia CMA at 8/15/2019  2:36 PM     Author: Geri Garcia CMA Service: -- Author Type: Certified Medical Assistant    Filed: 8/15/2019  2:39 PM Encounter Date: 8/15/2019 Status: Signed    : Geri Garcia CMA (Certified Medical Assistant)       Medication:   ? dextroamphetamine-amphetamine (ADDERALL XR) 20 MG 24 hr capsule [Pharmacy Med Name: Amphetamine-Dextroamphet ER Oral Capsule Extended Release 24 Hour 20 MG] 30 capsule 0       Sig: TAKE ONE CAPSULE BY MOUTH ONE TIME DAILY         Last Date Filled 07/17/2019     pulled: YES      Only PCP Prescribing?: YES    Taken as prescribed from physician notes?: YES     HPI: Six month controlled substance follow up.  Adderall continuing to benefit him.  Thoughts are organized.  Rare difficulty in getting to sleep.  No palpitations.  Appetite is ok. Losing weight intentionally.  Has been taking potassium two tablets daily every morning.  Review of  Letter in Nov 2018 indicates that the potassium was to be stopped after 3 days.  Feeling good.    CSA in last year: YES-10/12/2018    Random Utox in last year: YES-10/12/2018    Opioids + benzodiazepines? NO

## 2021-06-16 NOTE — TELEPHONE ENCOUNTER
Dakota Smith;  Your BP remains elevated.  As we discussed I would at this point recommend the addition of another BP med called amlodipine.  We would start with a low dose of 2.5 mg and then titrate the dosage up as necessary.  Occas there can be some leg swelling and it is something you should watch for.  If you would like to give this a try let me know and I will call in the rx.  Dr Méndez

## 2021-06-16 NOTE — TELEPHONE ENCOUNTER
Telephone Encounter by Patsy Downing MA at 11/18/2019 12:11 PM     Author: Patsy Downing MA Service: -- Author Type: Certified Medical Assistant    Filed: 11/18/2019 12:13 PM Encounter Date: 11/18/2019 Status: Signed    : Patsy Downing MA (Certified Medical Assistant)       Medication:   Requested Prescriptions     Pending Prescriptions Disp Refills   ? ADDERALL XR 20 mg 24 hr capsule [Pharmacy Med Name: Adderall XR Oral Capsule Extended Release 24 Hour 20 MG] 30 capsule 0     Sig: TAKE ONE CAPSULE BY MOUTH ONE TIME DAILY         Last Date Filled 10/18/19     pulled: YES      Only PCP Prescribing?: NO- [facility]    Taken as prescribed from physician notes?: YES  3. ADHD (attention deficit hyperactivity disorder)                         The diagnosis was confirmed.  The condition is stable/controlled on ADDERALL XR and no side effects  have been noted.  The appropriate follow up labs  ( UDS )have been ordered  (OR ARE UP TO DATE) and medication refills ordered if requested.    dextroamphetamine-amphetamine (ADDERALL XR) 20 MG 24 hr capsule         CSA in last year: YES    Random Utox in last year: YES    Opioids + benzodiazepines? NO

## 2021-06-17 NOTE — TELEPHONE ENCOUNTER
Telephone Encounter by Patsy Downing MA at 7/8/2020 11:33 AM     Author: Patsy Downing MA Service: -- Author Type: Certified Medical Assistant    Filed: 7/8/2020 11:35 AM Encounter Date: 7/6/2020 Status: Signed    : Patsy Downing MA (Certified Medical Assistant)       Medication:   Requested Prescriptions     Pending Prescriptions Disp Refills   ? ADDERALL XR 20 mg 24 hr capsule [Pharmacy Med Name: Adderall XR Oral Capsule Extended Release 24 Hour 20 MG] 30 capsule 0     Sig: Take 1 capsule (20 mg total) by mouth daily.         Last Date Filled 6/3/2020     pulled: YES      Only PCP Prescribing?: YES    Taken as prescribed from physician notes?: YES  HPI:   Med check.  BPs when checked in the 120/85 range at home.  Has been noticing that it is not working as well.  Doesn't seem like he has the sharpness that he used to.  Sometimes thoughts get scattered and sometimes forgets things.   Has noticed this for 4-5 months.  Takes the Adderall XR daily.    CSA in last year: YES    Random Utox in last year: YES    Opioids + benzodiazepines? NO

## 2021-06-17 NOTE — TELEPHONE ENCOUNTER
Telephone Encounter by Patsy Downing MA at 9/9/2020 10:12 AM     Author: Patsy Downing MA Service: -- Author Type: Certified Medical Assistant    Filed: 9/9/2020 10:13 AM Encounter Date: 9/8/2020 Status: Signed    : Patsy Downing MA (Certified Medical Assistant)       Medication:   Requested Prescriptions     Pending Prescriptions Disp Refills   ? ADDERALL XR 20 mg 24 hr capsule [Pharmacy Med Name: Adderall XR Oral Capsule Extended Release 24 Hour 20 MG] 30 capsule 0     Sig: Take 1 capsule by mouth daily.         Last Date Filled 8/7/2020     pulled: YES      Only PCP Prescribing?: YES    Taken as prescribed from physician notes?: YES  HPI:   Med check.  BPs when checked in the 120/85 range at home.  Has been noticing that it is not working as well.  Doesn't seem like he has the sharpness that he used to.  Sometimes thoughts get scattered and sometimes forgets things.   Has noticed this for 4-5 months.  Takes the Adderall XR daily.    CSA in last year: YES-due this month    Random Utox in last year: YES -due this month    Opioids + benzodiazepines? NO

## 2021-06-17 NOTE — TELEPHONE ENCOUNTER
Controlled Substance Refill Request  Medication Name:   Requested Prescriptions     Pending Prescriptions Disp Refills     ADDERALL XR 20 mg 24 hr capsule [Pharmacy Med Name: Adderall XR Oral Capsule Extended Release 24 Hour 20 MG] 30 capsule 0     Sig: TAKE ONE CAPSULE BY MOUTH ONE TIME DAILY. Fill 4/7     Date Last Fill: 4/7/21  [facility]ed [facility]  Submit electronically to pharmacy  Controlled Substance Agreement on file:   Encounter-Level CSA Scan Date:    There are no encounter-level csa scan date.        Last office visit:  2/23/21

## 2021-06-19 NOTE — LETTER
Letter by Giles Méndez MD at      Author: Giles Méndez MD Service: -- Author Type: --    Filed:  Encounter Date: 4/2/2019 Status: (Other)         Michael MARY Cunningham  [address]   [place] [address]             April 2, 2019         Dear Mr. Cunningham,    Below are the results from your recent visit:    Resulted Orders   Basic Metabolic Panel   Result Value Ref Range    Sodium 140 136 - 145 mmol/L    Potassium 4.1 3.5 - 5.0 mmol/L    Chloride 104 98 - 107 mmol/L    CO2 25 22 - 31 mmol/L    Anion Gap, Calculation 11 5 - 18 mmol/L    Glucose 113 70 - 125 mg/dL    Calcium 9.8 8.5 - 10.5 mg/dL    BUN 17 8 - 22 mg/dL    Creatinine 0.99 0.70 - 1.30 mg/dL    GFR MDRD Af Amer >60 >60 mL/min/1.73m2    GFR MDRD Non Af Amer >60 >60 mL/min/1.73m2    Narrative    Fasting Glucose reference range is 70-99 mg/dL per  American Diabetes Association (ADA) guidelines.   Glycosylated Hemoglobin A1c   Result Value Ref Range    Hemoglobin A1c 5.3 3.5 - 6.0 %     Dakota Rodriguez:  Your potassium has remained normal.  Let's recheck it after you have been off the potassium supplement for a month.  You really shouldn't need the extra potassium.  I will place a lab order.    The kidney function is normal.  There is no evidence of diabetes or pre-diabetes.    Please call with questions or contact us using [employer].    Sincerely,        Electronically signed by Giles Méndez MD

## 2021-06-19 NOTE — LETTER
Letter by Giles Méndez MD at      Author: Giles Méndez MD Service: -- Author Type: --    Filed:  Encounter Date: 4/2/2019 Status: (Other)         Michael MARY Cunningham  [address]   [place] [address]             April 2, 2019         Dear Mr. Cunningham,    Below are the results from your recent visit:    Resulted Orders   Basic Metabolic Panel   Result Value Ref Range    Sodium 140 136 - 145 mmol/L    Potassium 4.1 3.5 - 5.0 mmol/L    Chloride 104 98 - 107 mmol/L    CO2 25 22 - 31 mmol/L    Anion Gap, Calculation 11 5 - 18 mmol/L    Glucose 113 70 - 125 mg/dL    Calcium 9.8 8.5 - 10.5 mg/dL    BUN 17 8 - 22 mg/dL    Creatinine 0.99 0.70 - 1.30 mg/dL    GFR MDRD Af Amer >60 >60 mL/min/1.73m2    GFR MDRD Non Af Amer >60 >60 mL/min/1.73m2    Narrative    Fasting Glucose reference range is 70-99 mg/dL per  American Diabetes Association (ADA) guidelines.   Glycosylated Hemoglobin A1c   Result Value Ref Range    Hemoglobin A1c 5.3 3.5 - 6.0 %       Dakota Rodriguez:   Your potassium has remained normal.  Let's recheck it after you have been off the potassium supplement for a month.  You really shouldn't need the extra potassium.  I will place a lab order.     The kidney function is normal.   There is no evidence of diabetes or pre-diabetes.     Please call with questions or contact us using [employer].    Sincerely,        Electronically signed by Giles Méndez MD

## 2021-06-19 NOTE — LETTER
Letter by Giles Méndez MD at      Author: Giles Mnédez MD Service: -- Author Type: --    Filed:  Encounter Date: 9/18/2019 Status: (Other)         Michael Cunningham  [address]  [place] [address]             September 18, 2019         Dear Mr. Cunningham,    Below are the results from your recent visit:    Resulted Orders   Drug Abuse 1+, Urine   Result Value Ref Range    Amphetamines (!) Screen Negative     Screen Positive (Confirmation available on request)    Benzodiazepines Screen Negative Screen Negative    Opiates Screen Negative Screen Negative    Phencyclidine Screen Negative Screen Negative    THC Screen Negative Screen Negative    Barbiturates Screen Negative Screen Negative    Cocaine Metabolite Screen Negative Screen Negative    Methadone Screen Negative Screen Negative    Oxycodone Screen Negative Screen Negative    Creatinine, Urine 169.1 mg/dL    Narrative    Drug                           Screening Threshold    Amphetamines                    1000 ng/mL  Benzodiazepine                   200 ng/mL  Opiates                          300 ng/mL  Phencyclidine                     25 ng/mL  THC Metabolite                    50 ng/mL  Barbiturates                     200 ng/mL  Cocaine Metabolite               150 ng/mL  Methadone                        300 ng/mL  Oxycodone                        100 ng/mL    Screening results are to be used only for medical purposes.  Unconfirmed screening results are not to be used for non-  medical purposes.       Hi Luis:  The urine drug screen is consistent with prescribed medication.  Controlled med check due in 6 months.  It was good seeing you yesterday.    Please call with questions or contact us using [employer].    Sincerely,        Electronically signed by Giles Méndez MD

## 2021-06-19 NOTE — LETTER
Letter by Giles Méndez MD at      Author: Giles Méndez MD Service: -- Author Type: --    Filed:  Encounter Date: 9/17/2019 Status: (Other)         [facility] MEDICINE/OB  09/17/19    Patient: Michael Cunningham  YOB: 1980  Medical Record Number: [id number]  CSN: [id number]                                                                              Non-opioid Controlled Substance Agreement    I understand that my care provider has prescribed a controlled substance to help manage my condition(s). I am taking this medicine to help me function or work. I know this is strong medicine, and that it can cause serious side effects. Controlled substances can be sedating, addicting and may cause a dependency on the drug. They can affect my ability to drive or think, and cause depression. They need to be taken exactly as prescribed. Combining controlled substances with certain medicines or chemicals (such as cocaine, sedatives and tranquilizers, sleeping pills, meth) can be dangerous or even fatal. Also, if I stop controlled substances suddenly, I may have severe withdrawal symptoms.  If not helpful, I may be asked to stop them.    The risks, benefits, and side effects of these medicine(s) were explained to me. I agree that:    1. I will take part in other treatments as advised by my care team. This may be psychiatry or counseling, physical therapy, behavioral therapy, group treatment or a referral to a pain clinic. I will reduce or stop my medicine when my care team tells me to do so.  2. I will take my medicines as prescribed. I will not change the dose or schedule unless my care team tells me to. There will be no refills if I run out early.  I may be contactedwithout warning and asked to complete a urine drug test or pill count at any time.   3. I will keep all my appointments, and understand this is part of the monitoring of controlled substances. My care team may require an office  visit for EVERY controlled substance refill. If I miss appointments or dont follow instructions, my care team may stop my medicine.  4. I will not ask other providers to prescribe controlled substances, and I will not accept controlled substances from other people. If I need another prescribed controlled substance for a new reason, I will tell my care team within 1 business day.  5. I will use one pharmacy to fill all of my controlled substance prescriptions, and it is up to me to make sure that I do not run out of my medicines on weekends or holidays. If my care team is willing to refill my controlled substance prescription without a visit, I must request refills only during office hours, refills may take up to 3 days to process, and it may take up to 5 to 7 days for my medicine to be mailed and ready at my pharmacy. Prescriptions will not be mailed anywhere except my pharmacy.    6. I am responsible for my prescriptions. If the medicine/prescription is lost or stolen, it will not be replaced. I also agree not to share controlled substance medicines with anyone.          [facility] FAMILY MEDICINE/OB  09/17/19  Patient:  Michael Cunningham  YOB: 1980  Medical Record Number: [id number]  CSN: [id number]    7. I agree to not use ANY illegal or recreational drugs. This includes marijuana, cocaine, bath salts or other drugs. I agree not to use alcohol unless my care team says I may. I agree to give urine samples whenever asked. If I dont give a urine sample, the care team may stop my medicine.    8. If I enroll in the [place] Medical Marijuana program, I will tell my care team. I will also sign an agreement to share my medical records with my care team.    9. I will bring in my list of medicines (or my medicine bottles) each time I come to the clinic.   10. I will tell my care team right away if I become pregnant or have a new medical problem treated outside of my regular clinic.  11. I understand that this  medicine can affect my thinking and judgment. It may be unsafe for me to drive, use machinery and do dangerous tasks. I will not do any of these things until I know how the medicine affects me. If my dose changes, I will wait to see how it affects me. I will contact my care team if I have concerns about medicine side effects.    I understand that if I do not follow any of the conditions above, my prescriptions or treatment may be stopped.      I agree that my provider, clinic care team, and pharmacy may work with any city, state or federal law enforcement agency that investigates the misuse, sale, or other diversion of my controlled medicine. I will allow my provider to discuss my care with or share a copy of this agreement with any other treating provider, pharmacy or emergency room where I receive care. I agree to give up (waive) any right of privacy or confidentiality with respect to these consents.   I have read this agreement and have asked questions about anything I did not understand.    ___________________________________________________________________________  Patient signature - Date/Time  -Michael Cunningham                                      ___________________________________________________________________________  Witness signature                                                                    ___________________________________________________________________________  Provider signature- Giles Méndez MD

## 2021-06-19 NOTE — LETTER
Letter by Giles Méndez MD at      Author: Giles Méndez MD Service: -- Author Type: --    Filed:  Encounter Date: 4/12/2019 Status: (Other)       Michael Cunningham  [address]  [place] [address]                 April 12, 2019      Dear Michael:     At your previous appointment with us your blood pressure was elevated and I would like for you to schedule a nurse only appointment to recheck your blood pressure.    We have included a personalized hypertension report card on the following page that lists your most recent blood pressure readings along with your ideal values. Please message us through [employer] or call us at [phone] to schedule your nurse only appointment.    Thank you for including us as members of your health care team.      Sincerely,         Giles Méndez MD    Enclosure    Hypertension Report Card for Michael Cunningham  April 12, 2019:     Below is a summary of recent tests related to your hypertension.     Blood Pressure:   Normal blood pressure values are 120/80 or lower. Your blood pressure values should be less than 120/80.      Your most recent blood pressure readings at our clinic were:   BP Readings from Last 3 Encounters:   04/02/19 (!) 141/92   11/16/18 (!) 137/92   10/12/18 129/84

## 2021-06-19 NOTE — PROGRESS NOTES
"DIAGNOSIS:  1. LINDA (obstructive sleep apnea)     2. Difficulty concentrating  Thyroid [place]    Ambulatory referral to Psychology       PLAN:  Patient Instructions   ADD screening    Check thyroid            HPI:  For awhile having issues with focus \"really bad\".  Unable to do something and talk with someone at the same time.  Works as a .  Can't focus, multi-task or stay on one project.  Creeping up for two years.  Starting to affect his marriage.  Gets side tracked on projects at home.  Was treated for ADD  For 4-5 years and quit meds in first or second year of college.    Gets tired at end of 10 hour shift.  Losing weight and is 8 lbs off goal     Using CPAP every night for LINDA and sleeping well and feeling rested in the am              Current Outpatient Prescriptions on File Prior to Visit   Medication Sig Dispense Refill     triamterene-hydrochlorothiazide (MAXZIDE-25) 37.5-25 mg per tablet Take 2 tablets by mouth daily. 180 tablet 1     potassium chloride (KLOR-CON) 10 MEQ CR tablet Take 1 tablet (10 mEq total) by mouth daily. 90 tablet 3     No current facility-administered medications on file prior to visit.        Pmh: reviewed  Psh: reviewed  Allergy:  reviewed      EXAM:    /82 (Patient Site: Left Arm, Patient Position: Sitting, Cuff Size: Adult Large)  Pulse 62  Temp 97.2  F (36.2  C) (Oral)   Resp 20  Wt (!) 315 lb 6.4 oz (143.1 kg)  BMI 39.42 kg/m2  GEN:   ALERT, NAD, ORIENTED TIMES THREE  HEENT: TMS NL, PERRL, THR CLEAR  NECK: SUPPLE WITHOUT ADENOPATHY OR THYROMEGALY  LUNGS: CTA  COR: RRR WITHOUT MURMUR  ABD: SOFT, +BS, NONTX WITHOUT GUARDING OR PERITONEAL SIGNS  SKIN: NO RASH , ULCERS OR LESIONS NOTED  MS:  NEURO:  EXT: WITHOUT EDEMA OR SWELLING    No results found for this or any previous visit (from the past 168 hour(s)).       Results for orders placed or performed in visit on 02/19/18   Basic Metabolic Panel   Result Value Ref Range    Sodium 138 136 - 145 mmol/L    Potassium 3.6 " 3.5 - 5.0 mmol/L    Chloride 100 98 - 107 mmol/L    CO2 27 22 - 31 mmol/L    Anion Gap, Calculation 11 5 - 18 mmol/L    Glucose 111 70 - 125 mg/dL    Calcium 9.6 8.5 - 10.5 mg/dL    BUN 15 8 - 22 mg/dL    Creatinine 0.96 0.70 - 1.30 mg/dL    GFR MDRD Af Amer >60 >60 mL/min/1.73m2    GFR MDRD Non Af Amer >60 >60 mL/min/1.73m2

## 2021-06-19 NOTE — LETTER
Letter by Giles Méndez MD at      Author: Giles Méndez MD Service: -- Author Type: --    Filed:  Encounter Date: 4/2/2019 Status: (Other)         [facility] MEDICINE/OB  04/01/19    Patient: Michael Cunningham  YOB: 1980  Medical Record Number: [id number]  CSN: [id number]                                                                              Non-opioid Controlled Substance Agreement    I understand that my care provider has prescribed a controlled substance to help manage my condition(s). I am taking this medicine to help me function or work. I know this is strong medicine, and that it can cause serious side effects. Controlled substances can be sedating, addicting and may cause a dependency on the drug. They can affect my ability to drive or think, and cause depression. They need to be taken exactly as prescribed. Combining controlled substances with certain medicines or chemicals (such as cocaine, sedatives and tranquilizers, sleeping pills, meth) can be dangerous or even fatal. Also, if I stop controlled substances suddenly, I may have severe withdrawal symptoms.  If not helpful, I may be asked to stop them.    The risks, benefits, and side effects of these medicine(s) were explained to me. I agree that:    1. I will take part in other treatments as advised by my care team. This may be psychiatry or counseling, physical therapy, behavioral therapy, group treatment or a referral to a pain clinic. I will reduce or stop my medicine when my care team tells me to do so.  2. I will take my medicines as prescribed. I will not change the dose or schedule unless my care team tells me to. There will be no refills if I run out early.  I may be contactedwithout warning and asked to complete a urine drug test or pill count at any time.   3. I will keep all my appointments, and understand this is part of the monitoring of controlled substances. My care team may require an office  visit for EVERY controlled substance refill. If I miss appointments or dont follow instructions, my care team may stop my medicine.  4. I will not ask other providers to prescribe controlled substances, and I will not accept controlled substances from other people. If I need another prescribed controlled substance for a new reason, I will tell my care team within 1 business day.  5. I will use one pharmacy to fill all of my controlled substance prescriptions, and it is up to me to make sure that I do not run out of my medicines on weekends or holidays. If my care team is willing to refill my controlled substance prescription without a visit, I must request refills only during office hours, refills may take up to 3 days to process, and it may take up to 5 to 7 days for my medicine to be mailed and ready at my pharmacy. Prescriptions will not be mailed anywhere except my pharmacy.    6. I am responsible for my prescriptions. If the medicine/prescription is lost or stolen, it will not be replaced. I also agree not to share controlled substance medicines with anyone.          [facility] FAMILY MEDICINE/OB  04/01/19  Patient:  Michael Cunningham  YOB: 1980  Medical Record Number: [id number]  CSN: [id number]    7. I agree to not use ANY illegal or recreational drugs. This includes marijuana, cocaine, bath salts or other drugs. I agree not to use alcohol unless my care team says I may. I agree to give urine samples whenever asked. If I dont give a urine sample, the care team may stop my medicine.    8. If I enroll in the [place] Medical Marijuana program, I will tell my care team. I will also sign an agreement to share my medical records with my care team.    9. I will bring in my list of medicines (or my medicine bottles) each time I come to the clinic.   10. I will tell my care team right away if I become pregnant or have a new medical problem treated outside of my regular clinic.  11. I understand that this  medicine can affect my thinking and judgment. It may be unsafe for me to drive, use machinery and do dangerous tasks. I will not do any of these things until I know how the medicine affects me. If my dose changes, I will wait to see how it affects me. I will contact my care team if I have concerns about medicine side effects.    I understand that if I do not follow any of the conditions above, my prescriptions or treatment may be stopped.      I agree that my provider, clinic care team, and pharmacy may work with any city, state or federal law enforcement agency that investigates the misuse, sale, or other diversion of my controlled medicine. I will allow my provider to discuss my care with or share a copy of this agreement with any other treating provider, pharmacy or emergency room where I receive care. I agree to give up (waive) any right of privacy or confidentiality with respect to these consents.   I have read this agreement and have asked questions about anything I did not understand.    ___________________________________________________________________________  Patient signature - Date/Time  -Michael Cunningham                                      ___________________________________________________________________________  Witness signature                                                                    ___________________________________________________________________________  Provider signature- Giles Méndez MD

## 2021-06-20 NOTE — LETTER
Letter by Giles Méndez MD at      Author: Giles Méndez MD Service: -- Author Type: --    Filed:  Encounter Date: 6/30/2020 Status: (Other)         Michael Cunningham  [address]   [place] [address]             June 30, 2020         Dear Mr. Cunningham,    Below are the results from your recent visit:    Resulted Orders   Basic Metabolic Panel   Result Value Ref Range    Sodium 140 136 - 145 mmol/L    Potassium 4.0 3.5 - 5.0 mmol/L    Chloride 105 98 - 107 mmol/L    CO2 24 22 - 31 mmol/L    Anion Gap, Calculation 11 5 - 18 mmol/L    Glucose 96 70 - 125 mg/dL    Calcium 9.5 8.5 - 10.5 mg/dL    BUN 14 8 - 22 mg/dL    Creatinine 1.14 0.70 - 1.30 mg/dL    GFR MDRD Af Amer >60 >60 mL/min/1.73m2    GFR MDRD Non Af Amer >60 >60 mL/min/1.73m2    Narrative    Fasting Glucose reference range is 70-99 mg/dL per  American Diabetes Association (ADA) guidelines.   Glycosylated Hemoglobin A1c   Result Value Ref Range    Hemoglobin A1c 5.3 3.5 - 6.0 %   Lipid Profile   Result Value Ref Range    Triglycerides 568 (H) <=149 mg/dL    Cholesterol 148 <=199 mg/dL    LDL Calculated        Comment:      Invalid, Triglycerides >400    HDL Cholesterol 28 (L) >=40 mg/dL    Patient Fasting > 8hrs? Yes        Dakota Smith:  Your triglycerides were elevated with a low HDL.   This issue is likely weight related.  So weight loss through a low carb diet (including no or little alcohol) would be recommended.  We should recheck this in 3 months or so.    When the triglycerides start creeping above 500 then pancreatitis becomes a bigger concern.  Sometimes we will use a medication call gemfirozil to help in this situation.   Lets recheck fasting labs in 3 months.  If the labs don't look a lot better then we can talk about gemfibrozil.    The remaining labs are normal including potassium and kidney function.  There is no sign of diabetes or pre-diabetes as your fasting blood sugar and A1C are both normal.    Lab Results   Component  Value Date    TSH 1.06 08/07/2018     Since it has been awhile since we checked your thyroid I will do it with your fasting labs in 3 months.      Please call with questions or contact us using MyChart.    Sincerely,        Elec tronically signed by Giles Méndez MD

## 2021-06-20 NOTE — LETTER
Letter by Giles Méndez MD at      Author: Giles Méndez MD Service: -- Author Type: --    Filed:  Encounter Date: 9/22/2020 Status: (Other)         Michael Rubison  [address]  [place] [address]             September 22, 2020         Dear Mr. Cunningham,    Below are the results from your recent visit:    Resulted Orders   Lipid Profile   Result Value Ref Range    Triglycerides 228 (H) <=149 mg/dL    Cholesterol 135 <=199 mg/dL    LDL Calculated 59 <=129 mg/dL    HDL Cholesterol 30 (L) >=40 mg/dL    Patient Fasting > 8hrs? Unknown    Thyroid Cascade   Result Value Ref Range    TSH 1.33 0.30 - 5.00 uIU/mL   Drugs of Abuse 1,Urine   Result Value Ref Range    Amphetamines (!) Screen Negative     Screen Positive (Confirmation available on request)    Benzodiazepines Screen Negative Screen Negative    Opiates Screen Negative Screen Negative    Phencyclidine Screen Negative Screen Negative    THC Screen Negative Screen Negative    Barbiturates Screen Negative Screen Negative    Cocaine Metabolite Screen Negative Screen Negative    Oxycodone Screen Negative Screen Negative    Creatinine, Urine 261.9 mg/dL    Narrative    Drug                  Screening Threshold    Amphetamines           1000 ng/mL  Benzodiazepine          200 ng/mL  Opiates                 300 ng/mL  Phencyclidine            25 ng/mL  THC Metabolite           50 ng/mL  Barbiturates            200 ng/mL  Cocaine Metabolite      150 ng/mL  Oxycodone               100 ng/mL    Screening results are to be used only for medical purposes.  Unconfirmed screening results must not be used for non-  medical purposes.       Dakota Smith:  Your urine drug screen is consistent with medication prescribed.    The thyroid function is normal.  The triglycerides look much better are are only mildly elevated.  The HDL is slightly low.  Work on weight reduction through a diet low in carbs/calories and combined with exercise.        Please call with  questions or contact us using Remark.    Sincerely,        Electronically signed by Giles Méndez MD

## 2021-06-20 NOTE — LETTER
Letter by Giles Méndez MD at      Author: Giles Méndez MD Service: -- Author Type: --    Filed:  Encounter Date: 9/15/2020 Status: (Other)         [facility] MEDICINE/OB  09/15/20    Patient: Michael Cunningham  YOB: 1980  Medical Record Number: [id number]  CSN: [id number]                                                                              Non-opioid Controlled Substance Agreement    I understand that my care provider has prescribed a controlled substance to help manage my condition(s). I am taking this medicine to help me function or work. I know this is strong medicine, and that it can cause serious side effects. Controlled substances can be sedating, addicting and may cause a dependency on the drug. They can affect my ability to drive or think, and cause depression. They need to be taken exactly as prescribed. Combining controlled substances with certain medicines or chemicals (such as cocaine, sedatives and tranquilizers, sleeping pills, meth) can be dangerous or even fatal. Also, if I stop controlled substances suddenly, I may have severe withdrawal symptoms.  If not helpful, I may be asked to stop them.    The risks, benefits, and side effects of these medicine(s) were explained to me. I agree that:    1. I will take part in other treatments as advised by my care team. This may be psychiatry or counseling, physical therapy, behavioral therapy, group treatment or a referral to a pain clinic. I will reduce or stop my medicine when my care team tells me to do so.  2. I will take my medicines as prescribed. I will not change the dose or schedule unless my care team tells me to. There will be no refills if I run out early.  I may be contactedwithout warning and asked to complete a urine drug test or pill count at any time.   3. I will keep all my appointments, and understand this is part of the monitoring of controlled substances. My care team may require an office  visit for EVERY controlled substance refill. If I miss appointments or dont follow instructions, my care team may stop my medicine.  4. I will not ask other providers to prescribe controlled substances, and I will not accept controlled substances from other people. If I need another prescribed controlled substance for a new reason, I will tell my care team within 1 business day.  5. I will use one pharmacy to fill all of my controlled substance prescriptions, and it is up to me to make sure that I do not run out of my medicines on weekends or holidays. If my care team is willing to refill my controlled substance prescription without a visit, I must request refills only during office hours, refills may take up to 3 days to process, and it may take up to 5 to 7 days for my medicine to be mailed and ready at my pharmacy. Prescriptions will not be mailed anywhere except my pharmacy.    6. I am responsible for my prescriptions. If the medicine/prescription is lost or stolen, it will not be replaced. I also agree not to share controlled substance medicines with anyone.          [facility] FAMILY MEDICINE/OB  09/15/20  Patient:  Michael Cunningham  YOB: 1980  Medical Record Number: [id number]  CSN: [id number]    7. I agree to not use ANY illegal or recreational drugs. This includes marijuana, cocaine, bath salts or other drugs. I agree not to use alcohol unless my care team says I may. I agree to give urine samples whenever asked. If I dont give a urine sample, the care team may stop my medicine.    8. If I enroll in the [place] Medical Marijuana program, I will tell my care team. I will also sign an agreement to share my medical records with my care team.    9. I will bring in my list of medicines (or my medicine bottles) each time I come to the clinic.   10. I will tell my care team right away if I become pregnant or have a new medical problem treated outside of my regular clinic.  11. I understand that this  medicine can affect my thinking and judgment. It may be unsafe for me to drive, use machinery and do dangerous tasks. I will not do any of these things until I know how the medicine affects me. If my dose changes, I will wait to see how it affects me. I will contact my care team if I have concerns about medicine side effects.    I understand that if I do not follow any of the conditions above, my prescriptions or treatment may be stopped.      I agree that my provider, clinic care team, and pharmacy may work with any city, state or federal law enforcement agency that investigates the misuse, sale, or other diversion of my controlled medicine. I will allow my provider to discuss my care with or share a copy of this agreement with any other treating provider, pharmacy or emergency room where I receive care. I agree to give up (waive) any right of privacy or confidentiality with respect to these consents.   I have read this agreement and have asked questions about anything I did not understand.    ___________________________________________________________________________  Patient signature - Date/Time  -Michael Cunningham                                      ___________________________________________________________________________  Witness signature                                                                    ___________________________________________________________________________  Provider signature- Giles Méndez MD

## 2021-06-21 NOTE — PROGRESS NOTES
DIAGNOSIS:  1. Hypertension     2. Hypokalemia  Basic Metabolic Panel   3. Controlled substance agreement signed  Drug Abuse 1+, Urine   4. ADHD (attention deficit hyperactivity disorder)  dextroamphetamine-amphetamine (ADDERALL XR) 20 MG 24 hr capsule       PLAN:  Patient Instructions   BEGIN Adderal XR 20 mg daily    New CSA and UDS completed    Check BMP.  If potassium still low then resume potassium supplement.             HPI:  Prior dx of ADHD and now recurring sxs.   Saw  recently and they concurred with dx of ADHD.            Current Outpatient Prescriptions on File Prior to Visit   Medication Sig Dispense Refill     triamterene-hydrochlorothiazide (MAXZIDE-25) 37.5-25 mg per tablet Take 2 tablets by mouth daily. 180 tablet 1     [DISCONTINUED] potassium chloride (KLOR-CON) 10 MEQ CR tablet Take 1 tablet (10 mEq total) by mouth daily. 90 tablet 3     No current facility-administered medications on file prior to visit.        Pmh: reviewed  Psh: reviewed  Allergy:  reviewed      EXAM:    /84  Pulse 68  Resp 16  Wt (!) 312 lb 12.8 oz (141.9 kg)  BMI 39.1 kg/m2  GEN:   ALERT, NAD, ORIENTED TIMES THREE  NECK: SUPPLE WITHOUT ADENOPATHY OR THYROMEGALY  LUNGS: CTA  COR: RRR WITHOUT MURMUR  SKIN: NO RASH , ULCERS OR LESIONS NOTED  EXT: WITHOUT EDEMA OR SWELLING    No results found for this or any previous visit (from the past 168 hour(s)).  Results for orders placed or performed in visit on 02/19/18   Basic Metabolic Panel   Result Value Ref Range    Sodium 138 136 - 145 mmol/L    Potassium 3.6 3.5 - 5.0 mmol/L    Chloride 100 98 - 107 mmol/L    CO2 27 22 - 31 mmol/L    Anion Gap, Calculation 11 5 - 18 mmol/L    Glucose 111 70 - 125 mg/dL    Calcium 9.6 8.5 - 10.5 mg/dL    BUN 15 8 - 22 mg/dL    Creatinine 0.96 0.70 - 1.30 mg/dL    GFR MDRD Af Amer >60 >60 mL/min/1.73m2    GFR MDRD Non Af Amer >60 >60 mL/min/1.73m2     Results for orders placed or performed in visit on 08/27/14   Comprehensive Metabolic  Panel   Result Value Ref Range    Sodium 139 136 - 145 mmol/L    Potassium 4.0 3.5 - 5.0 mmol/L    Chloride 106 98 - 107 mmol/L    CO2 21 (L) 22 - 31 mmol/L    Anion Gap, Calculation 12 5 - 18 mmol/L    Glucose 104 70 - 125 mg/dL    BUN 17 8 - 22 mg/dL    Creatinine 0.90 0.70 - 1.30 mg/dL    Calcium 10.0 8.5 - 10.5 mg/dL    Bilirubin, Total 0.7 0.0 - 1.0 mg/dL    Protein, Total 7.0 6.0 - 8.0 g/dL    Albumin 4.2 3.5 - 5.0 g/dL    Alkaline Phosphatase 69 45 - 120 U/L    AST 24 0 - 40 U/L    ALT 59 (H) 0 - 45 U/L    GFR MDRD Af Amer >60 >60 mL/min/1.73m2    GFR MDRD Non Af Amer >60 >60 mL/min/1.73m2

## 2021-06-21 NOTE — PROGRESS NOTES
DIAGNOSIS:  1. Attention deficit hyperactivity disorder (ADHD), unspecified ADHD type     2. Hypertension         PLAN:  Patient Instructions   Recheck BMP    Continue Adderall at present dose.             HPI: since starting Adderall much better memory, losing weight (great), getting projects done, better outlook on life.   No insomnia.  No anorexia.  No palpitations.    Did resume potassium supplement.    Using CPAP every night.  Feels rested in the morning.           Current Outpatient Medications on File Prior to Visit   Medication Sig Dispense Refill     dextroamphetamine-amphetamine (ADDERALL XR) 20 MG 24 hr capsule TAKE 1 CAPSULE BY MOUTH ONCE DAILY. 30 capsule 0     potassium chloride (KLOR-CON) 10 MEQ CR tablet Take 1 tablet (10 mEq total) by mouth daily. 90 tablet 3     triamterene-hydrochlorothiazide (MAXZIDE-25) 37.5-25 mg per tablet Take 2 tablets by mouth daily. 180 tablet 1     No current facility-administered medications on file prior to visit.        Pmh: reviewed  Psh: reviewed  Allergy:  reviewed      EXAM:    BP (!) 137/92   Pulse 78   Temp 96.6  F (35.9  C) (Oral)   Resp 16   Wt (!) 302 lb (137 kg)   BMI 37.75 kg/m     BP Readings from Last 3 Encounters:   11/16/18 (!) 137/92   10/12/18 129/84   08/07/18 126/82      Wt Readings from Last 3 Encounters:   11/16/18 (!) 302 lb (137 kg)   10/12/18 (!) 312 lb 12.8 oz (141.9 kg)   08/07/18 (!) 315 lb 6.4 oz (143.1 kg)      GEN:   ALERT, NAD, ORIENTED TIMES THREE  NECK: SUPPLE WITHOUT ADENOPATHY OR THYROMEGALY  LUNGS: CTA  COR: RRR WITHOUT MURMU  SKIN: NO RASH , ULCERS OR LESIONS NOTED  EXT: WITHOUT EDEMA OR SWELLING    No results found for this or any previous visit (from the past 168 hour(s)).

## 2021-06-22 NOTE — TELEPHONE ENCOUNTER
Controlled Substance Refill Request  Medication:   Requested Prescriptions     Pending Prescriptions Disp Refills     dextroamphetamine-amphetamine (ADDERALL XR) 20 MG 24 hr capsule [Pharmacy Med Name: Amphetamine-Dextroamphet ER Oral Capsule Extended Release 24 Hour 20 MG] 30 capsule 0     Sig: TAKE 1 CAPSULE BY MOUTH ONCE DAILY.     Date Last Fill: 12/11/18  [facility]   Submit electronically to pharmacy  Controlled Substance Agreement on File:   Encounter-Level CSA Scan Date:    There are no encounter-level csa scan date.       Last office visit: Last office visit pertaining to requested medication was 11/16/18.

## 2021-06-23 NOTE — TELEPHONE ENCOUNTER
Controlled Substance Refill Request  Medication Name:   Requested Prescriptions     Pending Prescriptions Disp Refills     dextroamphetamine-amphetamine (ADDERALL XR) 20 MG 24 hr capsule [Pharmacy Med Name: Amphetamine-Dextroamphet ER Oral Capsule Extended Release 24 Hour 20 MG] 30 capsule 0     Sig: TAKE ONE CAPSULE BY MOUTH ONE TIME DAILY     Date Last Fill: 1/8/19  [facility]      Submit electronically to pharmacy  Controlled Substance Agreement Date Scanned:   Encounter-Level CSA Scan Date:    There are no encounter-level csa scan date.       Last office visit with prescriber/PCP: 11/16/2018 Giles Méndez MD OR same dept: 11/16/2018 Giles Méndez MD OR same specialty: 11/16/2018 Giles Méndez MD  Last physical: Visit date not found Last MTM visit: Visit date not found

## 2021-06-23 NOTE — TELEPHONE ENCOUNTER
Patient just realized he was out of this Rx and needs this for his Job interview on Monday. Please refill ASAP

## 2021-06-24 NOTE — TELEPHONE ENCOUNTER
Left message to call back for: .Blood pressure quality  Information to relay to patient:  Harpal Smith,     In reviewing your chart it looks like your last blood pressure was elevated.  We would like to ask that you come in for a nurse only visit to recheck your blood pressure.  Please schedule an appointment at your earliest convenience. You can call [phone] or you can schedule through [employer]. If you need any help scheduling please let us know.     Thank you,   [facility] Support Staff

## 2021-06-24 NOTE — TELEPHONE ENCOUNTER
First Attempt: LM for patient to call back to schedule a med check appt with Dr Méndez. New CSA needs to be signed.

## 2021-06-24 NOTE — TELEPHONE ENCOUNTER
Refill Approved    Rx renewed per Medication Renewal Policy. Medication was last renewed on 11/19/18.    Susan Slade, [facility] [place] Connection Triage/Med Refill 2/23/2019     Requested Prescriptions   Pending Prescriptions Disp Refills     losartan (COZAAR) 50 MG tablet [Pharmacy Med Name: Losartan Potassium Oral Tablet 50 MG] 30 tablet 1     Sig: TAKE ONE TABLET BY MOUTH ONE TIME DAILY    Angiotensin Receptor Blocker Protocol Passed - 2/22/2019  7:02 AM       Passed - PCP or prescribing provider visit in past 12 months      Last office visit with prescriber/PCP: 11/16/2018 Giles Méndez MD OR same dept: 11/16/2018 Giles Méndez MD OR same specialty: 11/16/2018 Giles Méndez MD  Last physical: Visit date not found Last MTM visit: Visit date not found   Next visit within 3 mo: Visit date not found  Next physical within 3 mo: Visit date not found  Prescriber OR PCP: Giles Méndez MD  Last diagnosis associated with med order: 1. Hypertension  - losartan (COZAAR) 50 MG tablet [Pharmacy Med Name: Losartan Potassium Oral Tablet 50 MG]; TAKE ONE TABLET BY MOUTH ONE TIME DAILY   Dispense: 30 tablet; Refill: 1    If protocol passes may refill for 12 months if within 3 months of last provider visit (or a total of 15 months).            Passed - Serum potassium within the past 12 months    Lab Results   Component Value Date    Potassium 2.9 (L) 11/16/2018            Passed - Blood pressure filed in past 12 months    BP Readings from Last 1 Encounters:   11/16/18 (!) 137/92            Passed - Serum creatinine within the past 12 months    Creatinine   Date Value Ref Range Status   11/16/2018 1.00 0.70 - 1.30 mg/dL Final

## 2021-06-24 NOTE — TELEPHONE ENCOUNTER
Controlled Substance Refill Request  Medication Name:   Requested Prescriptions     Pending Prescriptions Disp Refills     dextroamphetamine-amphetamine (ADDERALL XR) 20 MG 24 hr capsule 30 capsule 0     Sig: Take 1 capsule (20 mg total) by mouth daily.     Date Last Fill:    2/8/2019  Pharmacy:   [id number] [address] [place] [address]    Submit electronically to pharmacy  Controlled Substance Agreement Date Scanned:   Encounter-Level CSA Scan Date:    There are no encounter-level csa scan date.       Last office visit with prescriber/PCP: 11/16/2018 Giles Méndez MD OR same dept: 11/16/2018 Giles Méndez MD OR same specialty: 11/16/2018 Giles Méndez MD  Last physical: Visit date not found Last MTM visit: Visit date not found      Patient is out of the medication please expedite

## 2021-06-25 NOTE — TELEPHONE ENCOUNTER
Controlled Substance Refill Request  Medication Name:   Requested Prescriptions     Pending Prescriptions Disp Refills     ADDERALL XR 20 mg 24 hr capsule [Pharmacy Med Name: Adderall XR Oral Capsule Extended Release 24 Hour 20 MG] 30 capsule 0     Sig: TAKE ONE CAPSULE BY MOUTH ONE TIME DAILY.     Date Last Fill: 5/7/21  [facility]  Submit electronically to pharmacy  Controlled Substance Agreement on file:   Encounter-Level CSA Scan Date:    There are no encounter-level csa scan date.        Last office visit:  2/23/21

## 2021-06-25 NOTE — TELEPHONE ENCOUNTER
Second attempt: TCB to schedule a med check appt with Dr. Méndez since new CSA needs to be signed.

## 2021-06-25 NOTE — TELEPHONE ENCOUNTER
Medication:   ADDERALL XR 20 mg 24 hr capsule 30 capsule         Last Date Filled Per epic, 05/07/21     pulled: NO      Only PCP Prescribing?: Unknown    Taken as prescribed from physician notes?: YES  Adderall.  Adderall is taken daily and continues to provide benefit.        CSA in last year: YES    Random Utox in last year: NO    Opioids + benzodiazepines? NO

## 2021-07-05 ENCOUNTER — COMMUNICATION - HEALTHEAST (OUTPATIENT)
Dept: FAMILY MEDICINE | Facility: CLINIC | Age: 41
End: 2021-07-05

## 2021-07-05 DIAGNOSIS — I10 ESSENTIAL HYPERTENSION: ICD-10-CM

## 2021-07-05 DIAGNOSIS — F90.9 ADHD (ATTENTION DEFICIT HYPERACTIVITY DISORDER): ICD-10-CM

## 2021-07-05 NOTE — TELEPHONE ENCOUNTER
Telephone Encounter by Sandhya Waldron RN at 7/5/2021 10:44 AM     Author: Sandhya Waldron RN Service: -- Author Type: Registered Nurse    Filed: 7/5/2021 10:45 AM Encounter Date: 7/5/2021 Status: Signed    : Sandhya Waldron RN (Registered Nurse)       Controlled Substance Refill Request  Medication Name:   Requested Prescriptions     Pending Prescriptions Disp Refills   ? ADDERALL XR 20 mg 24 hr capsule [Pharmacy Med Name: Adderall XR Oral Capsule Extended Release 24 Hour 20 MG] 30 capsule 0     Sig: TAKE ONE CAPSULE BY MOUTH ONE TIME DAILY   ? amLODIPine (NORVASC) 2.5 MG tablet [Pharmacy Med Name: amLODIPine Besylate Oral Tablet 2.5 MG] 30 tablet 0     Sig: TAKE ONE TABLET BY MOUTH ONE TIME DAILY     Date Last Fill: Adderall-6/6/21. RN cannot refill BP medication due to a break in medication and small prescription sent last by provider.    [facility]ed [facility]  Submit electronically to pharmacy  Controlled Substance Agreement on file:   Encounter-Level CSA Scan Date:    There are no encounter-level csa scan date.        Last office visit:  2/23/21         Sandhya Waldron RN, BSN Nurse Triage Advisor 10:44 AM 7/5/2021

## 2021-07-05 NOTE — TELEPHONE ENCOUNTER
Telephone Encounter by Giles Méndez MD at 7/5/2021 11:14 AM     Author: Giles Méndez MD Service: -- Author Type: Physician    Filed: 7/5/2021 11:14 AM Encounter Date: 7/5/2021 Status: Signed    : Giles Méndez MD (Physician)       Controlled med follow up and BP follow up due in Aug 2021

## 2021-07-05 NOTE — TELEPHONE ENCOUNTER
Telephone Encounter by Barb Anne at 7/5/2021 11:35 AM     Author: Barb Anne Service: -- Author Type: Patient Access    Filed: 7/5/2021 11:36 AM Encounter Date: 7/5/2021 Status: Signed    : Barb Anne (Patient Access)       1st attempt: LVM for patient to call us back to get scheduled for a medication check and follow up BP in August 2021 when he is due.

## 2021-07-05 NOTE — TELEPHONE ENCOUNTER
Telephone Encounter by Sandhya Waldron RN at 7/5/2021 10:43 AM     Author: Sandhya Waldron RN Service: -- Author Type: Registered Nurse    Filed: 7/5/2021 10:45 AM Encounter Date: 7/5/2021 Status: Signed    : Sandhya Waldron RN (Registered Nurse)       RN cannot approve Refill Request    RN can NOT refill this medication RN cannot refill BP medication, patient given small prescription last. Last office visit: 2/23/2021 Giles Méndez MD Last Physical: Visit date not found Last MTM visit: Visit date not found Last visit same specialty: 2/23/2021 Giles Méndez MD.  Next visit within 3 mo: Visit date not found  Next physical within 3 mo: Visit date not found      Sandhya Waldron [facility] Triage/Med Refill 7/5/2021    Requested Prescriptions   Pending Prescriptions Disp Refills   ? ADDERALL XR 20 mg 24 hr capsule [Pharmacy Med Name: Adderall XR Oral Capsule Extended Release 24 Hour 20 MG] 30 capsule 0     Sig: TAKE ONE CAPSULE BY MOUTH ONE TIME DAILY       Controlled Substances Refill Protocol Failed - 7/5/2021  9:18 AM        Failed - Route all Controlled Substance Requests to Provider        Passed - Patient has controlled substance agreement in past 12 months     Encounter-Level CSA Scan Date:    There are no encounter-level csa scan date.               Passed - Visit with PCP or prescribing provider visit in past 12 months      Last office visit with prescriber/PCP: 2/23/2021 Giles Méndez MD OR same dept: 2/23/2021 Giles Méndez MD OR same specialty: 2/23/2021 Giles Méndez MD Last physical: Visit date not found Last MTM visit: Visit date not found    Next visit within 3 mo: Visit date not found  Next physical within 3 mo: Visit date not found  Prescriber OR PCP: Giles Méndez MD  Last diagnosis associated with med order: 1. ADHD (attention deficit hyperactivity disorder)  - ADDERALL XR 20 mg 24 hr capsule [Pharmacy  Med Name: Adderall XR Oral Capsule Extended Release 24 Hour 20 MG]; TAKE ONE CAPSULE BY MOUTH ONE TIME DAILY.  Dispense: 30 capsule; Refill: 0    2. Hypertension  - amLODIPine (NORVASC) 2.5 MG tablet [Pharmacy Med Name: amLODIPine Besylate Oral Tablet 2.5 MG]; TAKE ONE TABLET BY MOUTH ONE TIME DAILY   Dispense: 30 tablet; Refill: 0                ? amLODIPine (NORVASC) 2.5 MG tablet [Pharmacy Med Name: amLODIPine Besylate Oral Tablet 2.5 MG] 30 tablet 0     Sig: TAKE ONE TABLET BY MOUTH ONE TIME DAILY       Calcium-Channel Blockers Protocol Passed - 7/5/2021  9:18 AM        Passed - PCP or prescribing provider visit in past 12 months or next 3 months     Last office visit with prescriber/PCP: 2/23/2021 Giles Méndez MD OR same dept: 2/23/2021 Giles Méndez MD OR same specialty: 2/23/2021 Giles Méndez MD  Last physical: Visit date not found Last MTM visit: Visit date not found   Next visit within 3 mo: Visit date not found  Next physical within 3 mo: Visit date not found  Prescriber OR PCP: Giles Méndez MD  Last diagnosis associated with med order: 1. ADHD (attention deficit hyperactivity disorder)  - ADDERALL XR 20 mg 24 hr capsule [Pharmacy Med Name: Adderall XR Oral Capsule Extended Release 24 Hour 20 MG]; TAKE ONE CAPSULE BY MOUTH ONE TIME DAILY.  Dispense: 30 capsule; Refill: 0    2. Hypertension  - amLODIPine (NORVASC) 2.5 MG tablet [Pharmacy Med Name: amLODIPine Besylate Oral Tablet 2.5 MG]; TAKE ONE TABLET BY MOUTH ONE TIME DAILY   Dispense: 30 tablet; Refill: 0    If protocol passes may refill for 12 months if within 3 months of last provider visit (or a total of 15 months).             Passed - Blood pressure filed in past 12 months     BP Readings from Last 1 Encounters:   04/15/21 (!) 150/95

## 2021-08-02 DIAGNOSIS — F90.9 ATTENTION DEFICIT HYPERACTIVITY DISORDER (ADHD), UNSPECIFIED ADHD TYPE: Primary | ICD-10-CM

## 2021-08-04 PROBLEM — E78.1 HYPERTRIGLYCERIDEMIA: Status: ACTIVE | Noted: 2020-09-15

## 2021-08-04 PROBLEM — R73.03 PREDIABETES: Status: ACTIVE | Noted: 2020-04-03

## 2021-08-04 PROBLEM — G47.33 OSA (OBSTRUCTIVE SLEEP APNEA): Status: ACTIVE | Noted: 2021-08-04

## 2021-08-04 RX ORDER — DEXTROAMPHETAMINE SULFATE, DEXTROAMPHETAMINE SACCHARATE, AMPHETAMINE SULFATE AND AMPHETAMINE ASPARTATE 5; 5; 5; 5 MG/1; MG/1; MG/1; MG/1
CAPSULE, EXTENDED RELEASE ORAL
Qty: 30 CAPSULE | Refills: 0 | Status: SHIPPED | OUTPATIENT
Start: 2021-08-05 | End: 2021-09-07

## 2021-09-23 ENCOUNTER — OFFICE VISIT (OUTPATIENT)
Dept: FAMILY MEDICINE | Facility: CLINIC | Age: 41
End: 2021-09-23
Payer: COMMERCIAL

## 2021-09-23 VITALS
HEART RATE: 94 BPM | WEIGHT: 315 LBS | BODY MASS INDEX: 39.75 KG/M2 | SYSTOLIC BLOOD PRESSURE: 126 MMHG | RESPIRATION RATE: 20 BRPM | DIASTOLIC BLOOD PRESSURE: 95 MMHG

## 2021-09-23 DIAGNOSIS — Z79.899 CONTROLLED SUBSTANCE AGREEMENT SIGNED: ICD-10-CM

## 2021-09-23 DIAGNOSIS — F90.9 ATTENTION DEFICIT HYPERACTIVITY DISORDER (ADHD), UNSPECIFIED ADHD TYPE: ICD-10-CM

## 2021-09-23 DIAGNOSIS — R73.03 PREDIABETES: ICD-10-CM

## 2021-09-23 DIAGNOSIS — I10 ESSENTIAL HYPERTENSION: ICD-10-CM

## 2021-09-23 LAB
ALBUMIN SERPL-MCNC: 4.3 G/DL (ref 3.5–5)
ALP SERPL-CCNC: 67 U/L (ref 45–120)
ALT SERPL W P-5'-P-CCNC: 55 U/L (ref 0–45)
AMPHETAMINES UR QL SCN: ABNORMAL
ANION GAP SERPL CALCULATED.3IONS-SCNC: 12 MMOL/L (ref 5–18)
AST SERPL W P-5'-P-CCNC: 28 U/L (ref 0–40)
BARBITURATES UR QL: ABNORMAL
BENZODIAZ UR QL: ABNORMAL
BILIRUB SERPL-MCNC: 0.8 MG/DL (ref 0–1)
BUN SERPL-MCNC: 11 MG/DL (ref 8–22)
CALCIUM SERPL-MCNC: 9.4 MG/DL (ref 8.5–10.5)
CANNABINOIDS UR QL SCN: ABNORMAL
CHLORIDE BLD-SCNC: 103 MMOL/L (ref 98–107)
CO2 SERPL-SCNC: 25 MMOL/L (ref 22–31)
COCAINE UR QL: ABNORMAL
CREAT SERPL-MCNC: 0.92 MG/DL (ref 0.7–1.3)
CREAT UR-MCNC: 116 MG/DL
ERYTHROCYTE [DISTWIDTH] IN BLOOD BY AUTOMATED COUNT: 12 % (ref 10–15)
GFR SERPL CREATININE-BSD FRML MDRD: >90 ML/MIN/1.73M2
GLUCOSE BLD-MCNC: 95 MG/DL (ref 70–125)
HBA1C MFR BLD: 5.9 % (ref 0–5.6)
HCT VFR BLD AUTO: 49.4 % (ref 40–53)
HGB BLD-MCNC: 17.8 G/DL (ref 13.3–17.7)
MCH RBC QN AUTO: 31.6 PG (ref 26.5–33)
MCHC RBC AUTO-ENTMCNC: 36 G/DL (ref 31.5–36.5)
MCV RBC AUTO: 88 FL (ref 78–100)
METHADONE UR QL SCN: ABNORMAL
OPIATES UR QL SCN: ABNORMAL
OXYCODONE UR QL: ABNORMAL
PCP UR QL SCN: ABNORMAL
PLATELET # BLD AUTO: 241 10E3/UL (ref 150–450)
POTASSIUM BLD-SCNC: 3.9 MMOL/L (ref 3.5–5)
PROT SERPL-MCNC: 6.8 G/DL (ref 6–8)
RBC # BLD AUTO: 5.64 10E6/UL (ref 4.4–5.9)
SODIUM SERPL-SCNC: 140 MMOL/L (ref 136–145)
WBC # BLD AUTO: 6.7 10E3/UL (ref 4–11)

## 2021-09-23 PROCEDURE — 36415 COLL VENOUS BLD VENIPUNCTURE: CPT | Performed by: FAMILY MEDICINE

## 2021-09-23 PROCEDURE — 80307 DRUG TEST PRSMV CHEM ANLYZR: CPT | Performed by: FAMILY MEDICINE

## 2021-09-23 PROCEDURE — 83036 HEMOGLOBIN GLYCOSYLATED A1C: CPT | Performed by: FAMILY MEDICINE

## 2021-09-23 PROCEDURE — 99214 OFFICE O/P EST MOD 30 MIN: CPT | Performed by: FAMILY MEDICINE

## 2021-09-23 PROCEDURE — 80053 COMPREHEN METABOLIC PANEL: CPT | Performed by: FAMILY MEDICINE

## 2021-09-23 PROCEDURE — 85027 COMPLETE CBC AUTOMATED: CPT | Performed by: FAMILY MEDICINE

## 2021-09-23 RX ORDER — AMLODIPINE BESYLATE 2.5 MG/1
2.5 TABLET ORAL DAILY
COMMUNITY
Start: 2021-07-06 | End: 2021-09-23

## 2021-09-23 RX ORDER — AMLODIPINE BESYLATE 5 MG/1
5 TABLET ORAL DAILY
Qty: 30 TABLET | Refills: 1 | Status: SHIPPED | OUTPATIENT
Start: 2021-09-23 | End: 2021-12-08

## 2021-09-23 NOTE — PROGRESS NOTES
DIAGNOSIS:  Encounter Diagnoses   Name Primary?     Attention deficit hyperactivity disorder (ADHD), unspecified ADHD type, STABLE ON CURRENT MED, UPDATE CSA AND UDS      Controlled substance agreement signed      Essential hypertension, IMPROVING CONTROL, INCREASE AMLODIPINE TO 5 MG DAILY      Prediabetes, RECHECK A1C         PLAN:    Increase amlodipine to 5 mg daily    Nurse BP check in a week\    Follow up in 3 weeks.    Update CSA and UDS.         HPI:    Here for a BP check., drinks a lot of water and is losing weight.watching [place].   Ate 2-3 hours ago.  Adderall continues to be beneficial.  Has a lot of work related trauma and is seeing mental health for this.   No swelling in the legs    Current Outpatient Medications   Medication     ADDERALL XR 20 MG 24 hr capsule     amLODIPine (NORVASC) 5 MG tablet     losartan (COZAAR) 100 MG tablet     No current facility-administered medications for this visit.       Pmh: reviewed  Psh: reviewed  Allergy:  reviewed      EXAM:    BP (!) 126/95   Pulse 94   Resp 20   Wt 144.2 kg (318 lb)   BMI 39.75 kg/m       Wt Readings from Last 4 Encounters:   09/23/21 144.2 kg (318 lb)   02/23/21 149.1 kg (328 lb 9.6 oz)   09/30/20 142.4 kg (314 lb)   09/17/19 144.8 kg (319 lb 3.2 oz)     BP Readings from Last 6 Encounters:   09/23/21 (!) 126/95   04/15/21 (!) 150/95   02/23/21 (!) 148/92   09/17/19 138/87     GEN:   ALERT, NAD, ORIENTED TIMES THREE  LUNGS: CTA  COR: RRR WITHOUT MURMUR  EXT: WITHOUT EDEMA/SWELLING    Lab Results   Component Value Date    A1C 5.3 06/29/2020    A1C 5.3 04/02/2019    A1C 5.4 02/26/2016     TSH   Date Value Ref Range Status   09/22/2020 1.33 0.30 - 5.00 uIU/mL Final

## 2021-09-23 NOTE — LETTER
September 26, 2021      Michael Cunningham  [address] [place] [address]        Dear ,  We are writing to inform you of your test results.    Dakota Rodriguez:  Your A1C continues to be in the early pre-diabetic range.   Work on attaining weight reductions through a low carb diet combined with daily exercise.  Check the A1C in 3 months.    The liver enzyme is a hair elevated and again is something that weight reduction will help.  I would also avoid the use of alcohol.    The hemoglobin is just a hair above the upper edge of normal.   This is something we will keep an eye on and could check with your next A1C.    The urine drug screen is consistent with medication prescribed.  The remaining labs are normal.    Resulted Orders   Hemoglobin A1c   Result Value Ref Range    Hemoglobin A1C 5.9 (H) 0.0 - 5.6 %      Comment:      Normal <5.7%   Prediabetes 5.7-6.4%    Diabetes 6.5% or higher     Note: Adopted from ADA consensus guidelines.   Comprehensive metabolic panel (BMP + Alb, Alk Phos, ALT, AST, Total. Bili, TP)   Result Value Ref Range    Sodium 140 136 - 145 mmol/L    Potassium 3.9 3.5 - 5.0 mmol/L    Chloride 103 98 - 107 mmol/L    Carbon Dioxide (CO2) 25 22 - 31 mmol/L    Anion Gap 12 5 - 18 mmol/L    Urea Nitrogen 11 8 - 22 mg/dL    Creatinine 0.92 0.70 - 1.30 mg/dL    Calcium 9.4 8.5 - 10.5 mg/dL    Glucose 95 70 - 125 mg/dL    Alkaline Phosphatase 67 45 - 120 U/L    AST 28 0 - 40 U/L    ALT 55 (H) 0 - 45 U/L    Protein Total 6.8 6.0 - 8.0 g/dL    Albumin 4.3 3.5 - 5.0 g/dL    Bilirubin Total 0.8 0.0 - 1.0 mg/dL    GFR Estimate >90 >60 mL/min/1.73m2      Comment:      As of July 11, 2021, eGFR is calculated by the CKD-EPI creatinine equation, without race adjustment. eGFR can be influenced by muscle mass, exercise, and diet. The reported eGFR is an estimation only and is only applicable if the renal function is stable.   CBC with platelets   Result Value Ref Range    WBC Count 6.7 4.0 - 11.0 10e3/uL     RBC Count 5.64 4.40 - 5.90 10e6/uL    Hemoglobin 17.8 (H) 13.3 - 17.7 g/dL    Hematocrit 49.4 40.0 - 53.0 %    MCV 88 78 - 100 fL    MCH 31.6 26.5 - 33.0 pg    MCHC 36.0 31.5 - 36.5 g/dL    RDW 12.0 10.0 - 15.0 %    Platelet Count 241 150 - 450 10e3/uL   Drugs of Abuse 1+ Panel, Urine ([facility] Only)   Result Value Ref Range    Amphetamines Urine Screen Positive (A) Screen Negative    Benzodiazepines Urine Screen Negative Screen Negative    Opiates Urine Screen Negative Screen Negative    PCP Urine Screen Negative Screen Negative    Cannabinoids Urine Screen Negative Screen Negative    Barbiturates Urine Screen Negative Screen Negative    Cocaine Urine Screen Negative Screen Negative    Methadone Urine Screen Negative Screen Negative    Oxycodone Urine Screen Negative Screen Negative    Creatinine Urine mg/dL 116 mg/dL    Narrative    Drug                           Screening Threshold    Amphetamines                    1000 ng/mL  Benzodiazepine                   200 ng/mL  Opiates                          300 ng/mL  Phencyclidine                     25 ng/mL  THC Metabolite                    50 ng/mL  Barbiturates                     200 ng/mL  Cocaine Metabolite               150 ng/mL  Methadone                        300 ng/mL  Oxycodone                        100 ng/mL    Screening results are to be used only for medical purposes.  Unconfirmed screening results are not to be used for non-  medical purposes.       If you have any questions or concerns, please call the clinic at the number listed above.       Sincerely,      Giles Méndez MD

## 2021-09-23 NOTE — PATIENT INSTRUCTIONS
Increase amlodipine to 5 mg daily    Nurse BP check in a week\    Follow up in 3 weeks.    Update CSA and UDS.

## 2021-09-23 NOTE — LETTER
[facility]  09/23/21  Patient: Michael Cunningham  YOB: 1980  Medical Record Number: [id number]                                                                                  Non-Opioid Controlled Substance Agreement    This is an agreement between you and your provider regarding safe and appropriate use of controlled substances prescribed by your care team. Controlled substances are?medicines that can cause physical and mental dependence (abuse).     There are strict laws about having and using these medicines. We here at [facility] are  committed to working with you in your efforts to get better. To support you in this work, we'll help you schedule regular office appointments for medicine refills. If we must cancel or change your appointment for any reason, we'll make sure you have enough medicine to last until your next appointment.     As a Provider, I will:     Listen carefully to your concerns while treating you with respect.     Recommend a treatment plan that I believe is in your best interest and may involve therapies other than medicine.      Talk with you often about the possible benefits and the risk of harm of any medicine that we prescribe for you.    Assess the safety of this medicine and check how well it works.      Provide a plan on how to taper (discontinue or go off) using this medicine if the decision is made to stop its use.      ::  As a Patient, I understand controlled substances:       Are prescribed by my care provider to help me function or work and manage my condition(s).?    Are strong medicines and can cause serious side effects.       Need to be taken exactly as prescribed.?Combining controlled substances with certain medicines or chemicals (such as illegal drugs, alcohol, sedatives, sleeping pills, and benzodiazepines) can be dangerous or even fatal.? If I stop taking my medicines suddenly, I may have severe withdrawal symptoms.     The  risks, benefits, and side effects of these medicine(s) were explained to me. I agree that:    1. I will take part in other treatments as advised by my care team. This may be psychiatry or counseling, physical therapy, behavioral therapy, group treatment or a referral to specialist.    2. I will keep all my appointments and understand this is part of the monitoring of controlled substances.?My care team may require an office visit for EVERY controlled substance refill. If I miss appointments or don t follow instructions, my care team may stop my medicine    3. I will take my medicines as prescribed. I will not change the dose or schedule unless my care team tells me to. There will be no refills if I run out early.      4. I may be asked to come to the clinic and complete a urine drug test or complete a pill count. If I don t give a urine sample or participate in a pill count, the care team may stop my medicine.    5. I will only receive controlled substance prescriptions from this clinic. If I am treated by another provider, I will tell them that I am taking controlled substances and that I have a treatment agreement with this provider. I will inform my [facility] care team within one business day if I am given a prescription for any controlled substance by another healthcare provider. My [facility] care team can contact other providers and pharmacists about my use of any medicines.    6. It is up to me to make sure that I don't run out of my medicines on weekends or holidays.?If my care team is willing to refill my prescription without a visit, I must request refills only during office hours. Refills may take up to 3 business days to process. I will use one pharmacy to fill all my controlled substance prescriptions. I will notify the clinic about any changes to my insurance or medicine availability.    7. I am responsible for my prescriptions. If the medicine/prescription is lost, stolen or destroyed,  it will not be replaced.?I also agree not to share controlled substance medicines with anyone.     8. I am aware I should not use any illegal or recreational drugs. I agree not to drink alcohol unless my care team says I can.     9. If I enroll in the [place] Medical Cannabis program, I will tell my care team before my next refill.    10. I will tell my care team right away if I become pregnant, have a new medical problem treated outside of my regular clinic, or have a change in my medicines.     11. I understand that this medicine can affect my thinking, judgment and reaction time.? Alcohol and drugs affect the brain and body, which can affect the safety of my driving. Being under the influence of alcohol or drugs can affect my decision-making, behaviors, personal safety and the safety of others. Driving while impaired (DWI) can occur if a person is driving, operating or in physical control of a car, motorcycle, boat, snowmobile, ATV, motorbike, off-road vehicle or any other motor vehicle ([place] Statute 169A.20). I understand the risk if I choose to drive or operate any vehicle or machinery.    I understand that if I do not follow any of the conditions above, my prescriptions or treatment may be stopped or changed.   I agree that my provider, clinic care team and pharmacy may work with any city, state or federal law enforcement agency that investigates the misuse, sale or other diversion of my controlled medicine. I will allow my provider to discuss my care with, or share a copy of, this agreement with any other treating provider, pharmacy or emergency room where I receive care.     I have read this agreement and have asked questions about anything I did not understand.    ________________________________________________________  Patient Signature - Michael Cunningham     ___________________                   Date     ________________________________________________________  Provider Signature - Giles Méndez MD        ___________________                   Date     ________________________________________________________  Witness Signature (required if provider not present while patient signing)          ___________________                   Date

## 2021-09-26 ENCOUNTER — HEALTH MAINTENANCE LETTER (OUTPATIENT)
Age: 41
End: 2021-09-26

## 2021-09-26 DIAGNOSIS — R74.01 ELEVATED ALT MEASUREMENT: ICD-10-CM

## 2021-09-26 DIAGNOSIS — D58.2 ELEVATED HEMOGLOBIN (H): ICD-10-CM

## 2021-09-26 DIAGNOSIS — R73.03 PREDIABETES: Primary | ICD-10-CM

## 2021-10-05 DIAGNOSIS — F90.9 ATTENTION DEFICIT HYPERACTIVITY DISORDER (ADHD), UNSPECIFIED ADHD TYPE: ICD-10-CM

## 2021-10-05 NOTE — TELEPHONE ENCOUNTER
Routing refill request to provider for review/approval because:  Drug not on the [facility] refill protocol     Last Written Prescription Date:  9/7/21  Last Fill Quantity: 30,  # refills: 0   Last office visit provider:  9/23/21     Requested Prescriptions   Pending Prescriptions Disp Refills     ADDERALL XR 20 MG 24 hr capsule [Pharmacy Med Name: Adderall XR Oral Capsule Extended Release 24 Hour 20 MG] 30 capsule 0     Sig: TAKE 1 CAPSULE BY MOUTH ONE TIME DAILY.       There is no refill protocol information for this order          Kole Estrada RN 10/05/21 3:03 PM

## 2021-10-06 RX ORDER — DEXTROAMPHETAMINE SULFATE, DEXTROAMPHETAMINE SACCHARATE, AMPHETAMINE SULFATE AND AMPHETAMINE ASPARTATE 5; 5; 5; 5 MG/1; MG/1; MG/1; MG/1
CAPSULE, EXTENDED RELEASE ORAL
Qty: 30 CAPSULE | Refills: 0 | Status: SHIPPED | OUTPATIENT
Start: 2021-10-07 | End: 2021-11-04

## 2021-11-03 DIAGNOSIS — F90.9 ATTENTION DEFICIT HYPERACTIVITY DISORDER (ADHD), UNSPECIFIED ADHD TYPE: ICD-10-CM

## 2021-11-04 RX ORDER — DEXTROAMPHETAMINE SULFATE, DEXTROAMPHETAMINE SACCHARATE, AMPHETAMINE SULFATE AND AMPHETAMINE ASPARTATE 5; 5; 5; 5 MG/1; MG/1; MG/1; MG/1
CAPSULE, EXTENDED RELEASE ORAL
Qty: 30 CAPSULE | Refills: 0 | Status: SHIPPED | OUTPATIENT
Start: 2021-11-06 | End: 2021-12-06

## 2021-11-04 NOTE — TELEPHONE ENCOUNTER
Routing refill request to provider for review/approval because:  Controlled substance request  PCP information not populated in chart.    Last Written Prescription Date:  10/6/21  Last Fill Quantity: 30,  # refills: 0   Last office visit provider:  9/23/21     Requested Prescriptions   Pending Prescriptions Disp Refills     ADDERALL XR 20 MG 24 hr capsule [Pharmacy Med Name: Adderall XR Oral Capsule Extended Release 24 Hour 20 MG] 30 capsule 0     Sig: TAKE 1 CAPSULE BY MOUTH ONE TIME DAILY       There is no refill protocol information for this order          Giles Crow RN 11/04/21 12:43 PM

## 2021-12-06 DIAGNOSIS — F90.9 ATTENTION DEFICIT HYPERACTIVITY DISORDER (ADHD), UNSPECIFIED ADHD TYPE: ICD-10-CM

## 2021-12-06 RX ORDER — DEXTROAMPHETAMINE SULFATE, DEXTROAMPHETAMINE SACCHARATE, AMPHETAMINE SULFATE AND AMPHETAMINE ASPARTATE 5; 5; 5; 5 MG/1; MG/1; MG/1; MG/1
CAPSULE, EXTENDED RELEASE ORAL
Qty: 30 CAPSULE | Refills: 0 | Status: SHIPPED | OUTPATIENT
Start: 2021-12-08 | End: 2022-01-03

## 2021-12-06 NOTE — TELEPHONE ENCOUNTER
Routing refill request to provider for review/approval because:  Controlled substance request    Last Written Prescription Date:  11/4/2021  Last Fill Quantity: 30,  # refills: 0   Last office visit provider:  9/23/2021     Requested Prescriptions   Pending Prescriptions Disp Refills     ADDERALL XR 20 MG 24 hr capsule [Pharmacy Med Name: Adderall XR Oral Capsule Extended Release 24 Hour 20 MG] 30 capsule 0     Sig: TAKE 1 CAPSULE BY MOUTH ONE TIME DAILY       There is no refill protocol information for this order          Lori Ortiz RN 12/06/21 11:49 AM

## 2021-12-21 ENCOUNTER — TELEPHONE (OUTPATIENT)
Dept: FAMILY MEDICINE | Facility: CLINIC | Age: 41
End: 2021-12-21

## 2021-12-21 ENCOUNTER — ALLIED HEALTH/NURSE VISIT (OUTPATIENT)
Dept: FAMILY MEDICINE | Facility: CLINIC | Age: 41
End: 2021-12-21
Payer: COMMERCIAL

## 2021-12-21 VITALS — DIASTOLIC BLOOD PRESSURE: 90 MMHG | HEART RATE: 88 BPM | SYSTOLIC BLOOD PRESSURE: 146 MMHG

## 2021-12-21 DIAGNOSIS — I10 ESSENTIAL HYPERTENSION: Primary | ICD-10-CM

## 2021-12-21 PROCEDURE — 99207 PR NO CHARGE NURSE ONLY: CPT

## 2021-12-21 NOTE — TELEPHONE ENCOUNTER
Left message to call back. Please relay below message and assist in setting up next available appointment in about 1 month.         : Giles Méndez MD (Physician)         BP with fair control.  Recommend home Bps and see me in a month.

## 2021-12-21 NOTE — PROGRESS NOTES
I met with Michael Cunningham at the request of Dr. Méndez to recheck his blood pressure.  Blood pressure medications on the med list were reviewed with patient.    Patient has taken all medications as per usual regimen: Yes  Patient reports tolerating them without any issues or concerns: Yes    Vitals:    12/21/21 1013 12/21/21 1029   BP: (!) 136/93 (!) 146/90   Pulse: 82 88       After 5 minutes, the patient's blood pressure remained greater than or equal to 140/90.    Is the patient currently having any chest pain? No  Does the patient currently have a headache? No  Does the patient currently have any vision changes? No  Does the patient currently have any nausea? No  Does the patient currently have any abdominal pain? No    The previous encounter was reviewed.  The patient was discharged and the note will be sent to the provider for final review.

## 2022-01-03 DIAGNOSIS — F90.9 ATTENTION DEFICIT HYPERACTIVITY DISORDER (ADHD), UNSPECIFIED ADHD TYPE: ICD-10-CM

## 2022-01-04 RX ORDER — DEXTROAMPHETAMINE SULFATE, DEXTROAMPHETAMINE SACCHARATE, AMPHETAMINE SULFATE AND AMPHETAMINE ASPARTATE 5; 5; 5; 5 MG/1; MG/1; MG/1; MG/1
CAPSULE, EXTENDED RELEASE ORAL
Qty: 30 CAPSULE | Refills: 0 | Status: SHIPPED | OUTPATIENT
Start: 2022-01-07 | End: 2022-02-06

## 2022-01-16 ENCOUNTER — HEALTH MAINTENANCE LETTER (OUTPATIENT)
Age: 42
End: 2022-01-16

## 2022-02-02 DIAGNOSIS — F90.9 ATTENTION DEFICIT HYPERACTIVITY DISORDER (ADHD), UNSPECIFIED ADHD TYPE: ICD-10-CM

## 2022-02-04 NOTE — TELEPHONE ENCOUNTER
Routing refill request to provider for review/approval because:  Controlled substance request    Last Written Prescription Date:  1/4/22  Last Fill Quantity: 30,  # refills: 0   Last office visit provider:  9/23/21- Dr Méndez listed as PCP in [facility] chart     Requested Prescriptions   Pending Prescriptions Disp Refills     ADDERALL XR 20 MG 24 hr capsule [Pharmacy Med Name: Adderall XR Oral Capsule Extended Release 24 Hour 20 MG] 30 capsule 0     Sig: TAKE ONE CAPSULE BY MOUTH ONE TIME DAILY       There is no refill protocol information for this order          Ally Keita RN 02/04/22 12:08 PM

## 2022-02-06 RX ORDER — DEXTROAMPHETAMINE SULFATE, DEXTROAMPHETAMINE SACCHARATE, AMPHETAMINE SULFATE AND AMPHETAMINE ASPARTATE 5; 5; 5; 5 MG/1; MG/1; MG/1; MG/1
CAPSULE, EXTENDED RELEASE ORAL
Qty: 30 CAPSULE | Refills: 0 | Status: SHIPPED | OUTPATIENT
Start: 2022-02-06 | End: 2022-03-10

## 2022-03-09 DIAGNOSIS — F90.9 ATTENTION DEFICIT HYPERACTIVITY DISORDER (ADHD), UNSPECIFIED ADHD TYPE: ICD-10-CM

## 2022-03-10 RX ORDER — DEXTROAMPHETAMINE SULFATE, DEXTROAMPHETAMINE SACCHARATE, AMPHETAMINE SULFATE AND AMPHETAMINE ASPARTATE 5; 5; 5; 5 MG/1; MG/1; MG/1; MG/1
CAPSULE, EXTENDED RELEASE ORAL
Qty: 30 CAPSULE | Refills: 0 | Status: SHIPPED | OUTPATIENT
Start: 2022-03-10 | End: 2022-04-07

## 2022-03-10 NOTE — TELEPHONE ENCOUNTER
Routing refill request to provider for review/approval because:  Controlled Substance Request     Last Written Prescription Date:  2/6/22  Last Fill Quantity: 30,  # refills: 0   Last office visit provider: 9/23/21      Requested Prescriptions   Pending Prescriptions Disp Refills     ADDERALL XR 20 MG 24 hr capsule [Pharmacy Med Name: Adderall XR Oral Capsule Extended Release 24 Hour 20 MG] 30 capsule 0     Sig: TAKE ONE CAPSULE BY MOUTH ONE TIME DAILY       There is no refill protocol information for this order          Sandhya Waldron RN 03/10/22 8:02 AM

## 2022-03-18 ENCOUNTER — ALLIED HEALTH/NURSE VISIT (OUTPATIENT)
Dept: FAMILY MEDICINE | Facility: CLINIC | Age: 42
End: 2022-03-18
Payer: COMMERCIAL

## 2022-03-18 VITALS — DIASTOLIC BLOOD PRESSURE: 81 MMHG | HEART RATE: 88 BPM | SYSTOLIC BLOOD PRESSURE: 144 MMHG

## 2022-03-18 DIAGNOSIS — I10 ESSENTIAL HYPERTENSION: ICD-10-CM

## 2022-03-18 DIAGNOSIS — I10 ESSENTIAL HYPERTENSION: Primary | ICD-10-CM

## 2022-03-18 PROCEDURE — 99207 PR NO CHARGE NURSE ONLY: CPT

## 2022-03-18 RX ORDER — AMLODIPINE BESYLATE 10 MG/1
10 TABLET ORAL DAILY
Qty: 90 TABLET | Refills: 3 | Status: SHIPPED | OUTPATIENT
Start: 2022-03-18 | End: 2022-05-07

## 2022-03-18 NOTE — PROGRESS NOTES
Let's increase the amlodipine to 10 mg daily.  Take two 5 mg tabs.  I will change your rx to one 10 mg tab daily.  Please get a nurse BP CHECK in 1-2 weeks.  Monitor for leg swelling.

## 2022-03-18 NOTE — PROGRESS NOTES
Follow Up Blood Pressure Check    Michael Cunningham is a 41 year old male recommended to follow up for blood pressure check by Dr. Méndez. Anihypertensive medications and adherence were verified: Yes.     Reason for visit: Was told he needed to come in, not sure why he was here.     Medication change at last visit: No changes    Today's Vitals:   Vitals:    03/18/22 1121 03/18/22 1129   BP: (!) 142/83 (!) 144/81   Pulse: 82 88       Home blood pressure readings brought in today:   Did not bring any with, he will occasionally check at home, usually 130's over 80's    Lowest blood pressure today is <180/<110 and they deny signs or symptoms of new onset      Sara Wynn    Current Outpatient Medications   Medication Sig Dispense Refill     amLODIPine (NORVASC) 5 MG tablet TAKE ONE TABLET BY MOUTH ONE TIME DAILY. 90 tablet 0     losartan (COZAAR) 100 MG tablet Take 1 tablet by mouth daily. 90 tablet 1     ADDERALL XR 20 MG 24 hr capsule TAKE ONE CAPSULE BY MOUTH ONE TIME DAILY 30 capsule 0

## 2022-03-22 ENCOUNTER — TELEPHONE (OUTPATIENT)
Dept: FAMILY MEDICINE | Facility: CLINIC | Age: 42
End: 2022-03-22
Payer: COMMERCIAL

## 2022-03-22 NOTE — TELEPHONE ENCOUNTER
LMTCB, Please see message below from provider.    Giles Méndez MD at 3/18/2022 11:00 AM    Status: Signed      Let's increase the amlodipine to 10 mg daily.  Take two 5 mg tabs.  I will change your rx to one 10 mg tab daily.  Please get a nurse BP CHECK in 1-2 weeks.  Monitor for leg swelling.

## 2022-03-23 NOTE — TELEPHONE ENCOUNTER
Pt called back, relayed message- will call back to schedule nurse only visit for BP check once he increases dose

## 2022-04-06 ENCOUNTER — TELEPHONE (OUTPATIENT)
Dept: FAMILY MEDICINE | Facility: CLINIC | Age: 42
End: 2022-04-06
Payer: COMMERCIAL

## 2022-04-06 DIAGNOSIS — F90.9 ATTENTION DEFICIT HYPERACTIVITY DISORDER (ADHD), UNSPECIFIED ADHD TYPE: ICD-10-CM

## 2022-04-07 RX ORDER — DEXTROAMPHETAMINE SULFATE, DEXTROAMPHETAMINE SACCHARATE, AMPHETAMINE SULFATE AND AMPHETAMINE ASPARTATE 5; 5; 5; 5 MG/1; MG/1; MG/1; MG/1
CAPSULE, EXTENDED RELEASE ORAL
Qty: 30 CAPSULE | Refills: 0 | Status: SHIPPED | OUTPATIENT
Start: 2022-04-09 | End: 2022-05-09

## 2022-04-07 NOTE — TELEPHONE ENCOUNTER
Routing refill request to provider for review/approval because:  Controlled substance request    Last Written Prescription Date:  3/10/22  Last Fill Quantity: 30,  # refills: 0   Last office visit provider:  9/23/21   PCP [facility] in [facility]    Requested Prescriptions   Pending Prescriptions Disp Refills     ADDERALL XR 20 MG 24 hr capsule [Pharmacy Med Name: Adderall XR Oral Capsule Extended Release 24 Hour 20 MG] 30 capsule 0     Sig: TAKE ONE CAPSULE BY MOUTH ONE TIME DAILY.       There is no refill protocol information for this order          Ally Keita RN 04/07/22 11:52 AM

## 2022-05-03 DIAGNOSIS — I10 ESSENTIAL HYPERTENSION: ICD-10-CM

## 2022-05-06 RX ORDER — AMLODIPINE BESYLATE 5 MG/1
TABLET ORAL
Qty: 90 TABLET | Refills: 0 | OUTPATIENT
Start: 2022-05-06

## 2022-05-06 NOTE — TELEPHONE ENCOUNTER
Letter by Giulia Paris MD at      Author: Giulia Paris MD Service: -- Author Type: --    Filed:  Encounter Date: 9/12/2019 Status: (Other)       My Asthma Action Plan     Name: Josephine Churchill   YOB: 1976  Date: 9/12/2019   My doctor: Giulia Paris MD   My clinic: [facility] MEDICINE/OB        My Rescue Medicine:   Albuterol (Proair/Ventolin/Proventil HFA) 2-4 puffs EVERY 4 HOURS as needed. Use a spacer if recommended by your provider.   My Asthma Severity:   Intermittent/Exercise Induced  Know your asthma triggers: upper respiratory infections, humidity and exercise or sports             GREEN ZONE   Good Control    I feel good    No cough or wheeze    Can work, sleep and play without asthma symptoms     Take your asthma control medicine every day.     1. If exercise triggers your asthma, take your rescue medication    15 minutes before exercise or sports, and    During exercise if you have asthma symptoms  2. Spacer to use with inhaler: If you have a spacer, make sure to use it with your inhaler             YELLOW ZONE Getting Worse  I have ANY of these:    I do not feel good    Cough or wheeze    Chest feels tight    Wake up at night 1. Keep taking your Green Zone medications  2. Start taking your rescue medicine:    every 20 minutes for up to 1 hour. Then every 4 hours for 24-48 hours.  3. If you stay in the Yellow Zone for more than 12-24 hours, contact your doctor.  4. If you do not return to the Green Zone in 12-24 hours or you get worse, start taking your oral steroid medicine if prescribed by your provider.           RED ZONE Medical Alert - Get Help  I have ANY of these:    I feel awful    Medicine is not helping    Breathing getting harder    Trouble walking or talking    Nose opens wide to breathe     1. Take your rescue medicine NOW  2. If your provider has prescribed an oral steroid medicine, start taking it NOW  3. Call your doctor NOW  4. If you are  Outpatient Medication Detail     Disp Refills Start End JOHN   amLODIPine (NORVASC) 5 MG tablet (Discontinued) 90 tablet 0 3/4/2022 3/18/2022 No   Sig: TAKE ONE TABLET BY MOUTH ONE TIME DAILY.   Sent to pharmacy as: amLODIPine Besylate 5 MG Oral Tablet (NORVASC)   Class: E-Prescribe   Order: [id number]   E-Prescribing Status: Receipt confirmed by pharmacy (3/4/2022  6:33 PM CST)          still in the Red Zone after 20 minutes and you have not reached your doctor:    Take your rescue medicine again and    Call 911 or go to the emergency room right away    See your regular doctor within 2 weeks of an Emergency Room or Urgent Care visit for follow-up treatment.          Annual Reminders:  Meet with Asthma Educator,  Flu Shot in the Fall, consider Pneumonia Vaccination for patients with asthma (aged 19 and older).    Pharmacy:   [id number] [place] [address] [address]  Phone: [phone] Fax: [phone]                            Asthma Triggers  How To Control Things That Make Your Asthma Worse    Triggers are things that make your asthma worse.  Look at the list below to help you find your triggers and what you can do about them.  You can help prevent asthma flare-ups by staying away from your triggers.      Trigger                                                          What you can do   Cigarette Smoke  Tobacco smoke can make asthma worse. Do not allow smoking in your home, car or around you.  Be sure no one smokes at a sherry day care or school.  If you smoke, ask your health care provider for ways to help you quit.  Ask family members to quit too.  Ask your health care provider for a referral to Quit Plan to help you quit smoking, or call 8-125-934-PLAN.     Colds, Flu, Bronchitis  These are common triggers of asthma. Wash your hands often.  Dont touch your eyes, nose or mouth.  Get a flu shot every year.     Dust Mites  These are tiny bugs that live in cloth or carpet. They are too small to see. Wash sheets and blankets in hot water every week.   Encase pillows and mattress in dust mite proof covers.  Avoid having carpet if you can. If you have carpet, vacuum weekly.   Use a dust mask and HEPA vacuum.   Pollen and Outdoor Mold  Some people are allergic to trees, grass, or weed pollen, or molds. Try to keep your windows closed.  Limit time  out doors when pollen count is high.   Ask you health care provider about taking medicine during allergy season.     Animal Dander  Some people are allergic to skin flakes, urine or saliva from pets with fur or feathers. Keep pets with fur or feathers out of your home.    If you cant keep the pet outdoors, then keep the pet out of your bedroom.  Keep the bedroom door closed.  Keep pets off cloth furniture and away from stuffed toys.     Mice, Rats, and Cockroaches  Some people are allergic to the waste from these pests.   Cover food and garbage.  Clean up spills and food crumbs.  Store grease in the refrigerator.   Keep food out of the bedroom.   Indoor Mold  This can be a trigger if your home has high moisture. Fix leaking faucets, pipes, or other sources of water.   Clean moldy surfaces.  Dehumidify basement if it is damp and smelly.   Smoke, Strong Odors, and Sprays  These can reduce air quality. Stay away from strong odors and sprays, such as perfume, powder, hair spray, paints, smoke incense, paint, cleaning products, candles and new carpet.   Exercise or Sports  Some people with asthma have this trigger. Be active!  Ask your doctor about taking medicine before sports or exercise to prevent symptoms.    Warm up for 5-10 minutes before and after sports or exercise.     Other Triggers of Asthma  Cold air:  Cover your nose and mouth with a scarf.  Sometimes laughing or crying can be a trigger.  Some medicines and food can trigger asthma.

## 2022-05-07 ENCOUNTER — NURSE TRIAGE (OUTPATIENT)
Dept: NURSING | Facility: CLINIC | Age: 42
End: 2022-05-07
Payer: COMMERCIAL

## 2022-05-07 DIAGNOSIS — I10 ESSENTIAL HYPERTENSION: ICD-10-CM

## 2022-05-07 RX ORDER — AMLODIPINE BESYLATE 10 MG/1
10 TABLET ORAL DAILY
Qty: 90 TABLET | Refills: 3 | Status: SHIPPED | OUTPATIENT
Start: 2022-05-07 | End: 2023-02-13

## 2022-05-07 NOTE — TELEPHONE ENCOUNTER
Caller states that RX not at  Pharmacy;   Advised that current order will be re sent by e scribe to pharmacy   Dinora Conley RN  FNA      Reason for Disposition    [1] Prescription prescribed recently is not at pharmacy AND [2] triager has access to patient's EMR AND [3] prescription is recorded in the EMR    Protocols used: MEDICATION QUESTION CALL-A-AH

## 2022-05-09 DIAGNOSIS — F90.9 ATTENTION DEFICIT HYPERACTIVITY DISORDER (ADHD), UNSPECIFIED ADHD TYPE: ICD-10-CM

## 2022-05-09 RX ORDER — DEXTROAMPHETAMINE SULFATE, DEXTROAMPHETAMINE SACCHARATE, AMPHETAMINE SULFATE AND AMPHETAMINE ASPARTATE 5; 5; 5; 5 MG/1; MG/1; MG/1; MG/1
CAPSULE, EXTENDED RELEASE ORAL
Qty: 30 CAPSULE | Refills: 0 | Status: SHIPPED | OUTPATIENT
Start: 2022-05-10 | End: 2022-06-06

## 2022-06-06 DIAGNOSIS — F90.9 ATTENTION DEFICIT HYPERACTIVITY DISORDER (ADHD), UNSPECIFIED ADHD TYPE: ICD-10-CM

## 2022-06-06 RX ORDER — DEXTROAMPHETAMINE SULFATE, DEXTROAMPHETAMINE SACCHARATE, AMPHETAMINE SULFATE AND AMPHETAMINE ASPARTATE 5; 5; 5; 5 MG/1; MG/1; MG/1; MG/1
CAPSULE, EXTENDED RELEASE ORAL
Qty: 30 CAPSULE | Refills: 0 | Status: SHIPPED | OUTPATIENT
Start: 2022-06-13 | End: 2022-07-25

## 2022-06-06 NOTE — TELEPHONE ENCOUNTER
**Patient has an appointment scheduled for 06/23/22.    Routing refill request to provider for review/approval because:  Drug not on the [facility] refill protocol     Angeles Ambriz RN

## 2022-06-16 NOTE — TELEPHONE ENCOUNTER
Controlled Substance Refill Request  Medication Name:   Requested Prescriptions     Pending Prescriptions Disp Refills     ADDERALL XR 20 mg 24 hr capsule [Pharmacy Med Name: Adderall XR Oral Capsule Extended Release 24 Hour 20 MG] 30 capsule 0     Sig: TAKE ONE CAPSULE BY MOUTH ONE TIME DAILY.     Date Last Fill: 3/8/21  [facility]ed [facility]  Submit electronically to pharmacy  Controlled Substance Agreement on file:   Encounter-Level CSA Scan Date:    There are no encounter-level csa scan date.        Last office visit:  2/23/21        
Medication:   ADDERALL XR 20 mg 24 hr capsule 30 capsule         Last Date Filled Per epic, 03/08/21     pulled: NO  Unable to pul  under provider, no access    Only PCP Prescribing?: Unknown    Taken as prescribed from physician notes?: YES  Adderall.  Adderall is taken daily and continues to provide benefit.        CSA in last year: YES    Random Utox in last year: YES    Opioids + benzodiazepines? NO      
Patient notified. Scheduled BP check for 4/8   
Please remind pt to get two nurse BP checks per our visit on 2-23-21.  
170.18

## 2022-06-23 ENCOUNTER — OFFICE VISIT (OUTPATIENT)
Dept: FAMILY MEDICINE | Facility: CLINIC | Age: 42
End: 2022-06-23
Payer: COMMERCIAL

## 2022-06-23 VITALS
RESPIRATION RATE: 12 BRPM | HEART RATE: 80 BPM | DIASTOLIC BLOOD PRESSURE: 82 MMHG | TEMPERATURE: 98.5 F | OXYGEN SATURATION: 98 % | SYSTOLIC BLOOD PRESSURE: 133 MMHG | BODY MASS INDEX: 40.02 KG/M2 | WEIGHT: 315 LBS

## 2022-06-23 DIAGNOSIS — Z11.59 NEED FOR HEPATITIS C SCREENING TEST: ICD-10-CM

## 2022-06-23 DIAGNOSIS — F90.9 ATTENTION DEFICIT HYPERACTIVITY DISORDER (ADHD), UNSPECIFIED ADHD TYPE: ICD-10-CM

## 2022-06-23 DIAGNOSIS — E78.1 HYPERTRIGLYCERIDEMIA: ICD-10-CM

## 2022-06-23 DIAGNOSIS — R73.03 PREDIABETES: ICD-10-CM

## 2022-06-23 DIAGNOSIS — Z79.899 CONTROLLED SUBSTANCE AGREEMENT SIGNED: ICD-10-CM

## 2022-06-23 DIAGNOSIS — I10 ESSENTIAL HYPERTENSION: ICD-10-CM

## 2022-06-23 DIAGNOSIS — Z11.4 SCREENING FOR HIV (HUMAN IMMUNODEFICIENCY VIRUS): ICD-10-CM

## 2022-06-23 DIAGNOSIS — D58.2 ELEVATED HEMOGLOBIN (H): ICD-10-CM

## 2022-06-23 LAB
ALBUMIN SERPL-MCNC: 4.1 G/DL (ref 3.5–5)
ALP SERPL-CCNC: 64 U/L (ref 45–120)
ALT SERPL W P-5'-P-CCNC: 51 U/L (ref 0–45)
AMPHETAMINES UR QL SCN: NORMAL
ANION GAP SERPL CALCULATED.3IONS-SCNC: 13 MMOL/L (ref 5–18)
AST SERPL W P-5'-P-CCNC: 25 U/L (ref 0–40)
BARBITURATES UR QL SCN: NORMAL
BENZODIAZ UR QL SCN: NORMAL
BILIRUB SERPL-MCNC: 1 MG/DL (ref 0–1)
BUN SERPL-MCNC: 14 MG/DL (ref 8–22)
BZE UR QL SCN: NORMAL
CALCIUM SERPL-MCNC: 9.5 MG/DL (ref 8.5–10.5)
CANNABINOIDS UR QL SCN: NORMAL
CHLORIDE BLD-SCNC: 104 MMOL/L (ref 98–107)
CHOLEST SERPL-MCNC: 155 MG/DL
CO2 SERPL-SCNC: 24 MMOL/L (ref 22–31)
CREAT SERPL-MCNC: 0.93 MG/DL (ref 0.7–1.3)
ERYTHROCYTE [DISTWIDTH] IN BLOOD BY AUTOMATED COUNT: 12 % (ref 10–15)
ETHANOL UR QL SCN: NORMAL
FASTING STATUS PATIENT QL REPORTED: ABNORMAL
GFR SERPL CREATININE-BSD FRML MDRD: >90 ML/MIN/1.73M2
GLUCOSE BLD-MCNC: 102 MG/DL (ref 70–125)
HBA1C MFR BLD: 5.9 % (ref 0–5.6)
HCT VFR BLD AUTO: 47.1 % (ref 40–53)
HDLC SERPL-MCNC: 31 MG/DL
HGB BLD-MCNC: 17.1 G/DL (ref 13.3–17.7)
HIV 1+2 AB+HIV1 P24 AG SERPL QL IA: NEGATIVE
LDLC SERPL CALC-MCNC: 46 MG/DL
MCH RBC QN AUTO: 32.1 PG (ref 26.5–33)
MCHC RBC AUTO-ENTMCNC: 36.3 G/DL (ref 31.5–36.5)
MCV RBC AUTO: 88 FL (ref 78–100)
OPIATES UR QL SCN: NORMAL
PCP QUAL URINE (ROCHE): NORMAL
PLATELET # BLD AUTO: 254 10E3/UL (ref 150–450)
POTASSIUM BLD-SCNC: 3.7 MMOL/L (ref 3.5–5)
PROT SERPL-MCNC: 7 G/DL (ref 6–8)
RBC # BLD AUTO: 5.33 10E6/UL (ref 4.4–5.9)
SODIUM SERPL-SCNC: 141 MMOL/L (ref 136–145)
TRIGL SERPL-MCNC: 388 MG/DL
WBC # BLD AUTO: 7.2 10E3/UL (ref 4–11)

## 2022-06-23 PROCEDURE — 87389 HIV-1 AG W/HIV-1&-2 AB AG IA: CPT | Performed by: FAMILY MEDICINE

## 2022-06-23 PROCEDURE — 36415 COLL VENOUS BLD VENIPUNCTURE: CPT | Performed by: FAMILY MEDICINE

## 2022-06-23 PROCEDURE — 86803 HEPATITIS C AB TEST: CPT | Performed by: FAMILY MEDICINE

## 2022-06-23 PROCEDURE — 83036 HEMOGLOBIN GLYCOSYLATED A1C: CPT | Performed by: FAMILY MEDICINE

## 2022-06-23 PROCEDURE — 85027 COMPLETE CBC AUTOMATED: CPT | Performed by: FAMILY MEDICINE

## 2022-06-23 PROCEDURE — 80053 COMPREHEN METABOLIC PANEL: CPT | Performed by: FAMILY MEDICINE

## 2022-06-23 PROCEDURE — 80307 DRUG TEST PRSMV CHEM ANLYZR: CPT | Performed by: FAMILY MEDICINE

## 2022-06-23 PROCEDURE — 99214 OFFICE O/P EST MOD 30 MIN: CPT | Performed by: FAMILY MEDICINE

## 2022-06-23 PROCEDURE — 80061 LIPID PANEL: CPT | Performed by: FAMILY MEDICINE

## 2022-06-23 NOTE — PATIENT INSTRUCTIONS
Update the CSA and UDS today    Follow up on Adderall in 6 months    [place] screening agreed to for HIV and Hep C

## 2022-06-23 NOTE — LETTER
June 26, 2022      Michael Cunningham  [address] [place] [address]        Dear ,  We are writing to inform you of your test results.    Dakota Rodriguez;  Your universal screening for HIV and Hepatitis C is NEGATIVE.    The elevated triglycerides and liver enzyme will generally respond to weight reduction.  Your total cholesterol and LDL are otherwise very good.  The low HDL may improve with exercise (goal HDL is >40).    Also your pre-diabetic state (A1C of 5.9) remains stable;   Weight reduction through a low carb diet combined with exercise will help slow/prevent progression of this to reese diabetes.    The remaining labs are NORMAL and the urine drug screen is NEGATIVE.    Resulted Orders   HIV Antigen Antibody Combo   Result Value Ref Range    HIV Antigen Antibody Combo Negative Negative   Hepatitis C antibody   Result Value Ref Range    Hepatitis C Antibody Negative Negative    Narrative    Assay performance characteristics have not been established for newborns, infants, children (<18 years) or populations of immunocompromised or immunosuppressed patients.    CBC with platelets   Result Value Ref Range    WBC Count 7.2 4.0 - 11.0 10e3/uL    RBC Count 5.33 4.40 - 5.90 10e6/uL    Hemoglobin 17.1 13.3 - 17.7 g/dL    Hematocrit 47.1 40.0 - 53.0 %    MCV 88 78 - 100 fL    MCH 32.1 26.5 - 33.0 pg    MCHC 36.3 31.5 - 36.5 g/dL    RDW 12.0 10.0 - 15.0 %    Platelet Count 254 150 - 450 10e3/uL   Comprehensive metabolic panel (BMP + Alb, Alk Phos, ALT, AST, Total. Bili, TP)   Result Value Ref Range    Sodium 141 136 - 145 mmol/L    Potassium 3.7 3.5 - 5.0 mmol/L    Chloride 104 98 - 107 mmol/L    Carbon Dioxide (CO2) 24 22 - 31 mmol/L    Anion Gap 13 5 - 18 mmol/L    Urea Nitrogen 14 8 - 22 mg/dL    Creatinine 0.93 0.70 - 1.30 mg/dL    Calcium 9.5 8.5 - 10.5 mg/dL    Glucose 102 70 - 125 mg/dL    Alkaline Phosphatase 64 45 - 120 U/L    AST 25 0 - 40 U/L    ALT 51 (H) 0 - 45 U/L    Protein Total 7.0 6.0 - 8.0  g/dL    Albumin 4.1 3.5 - 5.0 g/dL    Bilirubin Total 1.0 0.0 - 1.0 mg/dL    GFR Estimate >90 >60 mL/min/1.73m2      Comment:      Effective December 21, 2021 eGFRcr in adults is calculated using the 2021 CKD-EPI creatinine equation which includes age and gender (Kindra taylor al., NEJ, DOI: 10.1056/SZXZcl7726824)   Lipid panel reflex to direct LDL Fasting   Result Value Ref Range    Cholesterol 155 <=199 mg/dL    Triglycerides 388 (H) <=149 mg/dL    Direct Measure HDL 31 (L) >=40 mg/dL      Comment:      HDL Cholesterol Reference Range:     0-2 years:   No reference ranges established for patients under 2 years old  at [facility] for lipid analytes.    2-8 years:  Greater than 45 mg/dL     18 years and older:   Female: Greater than or equal to 50 mg/dL   Male:   Greater than or equal to 40 mg/dL    LDL Cholesterol Calculated 46 <=129 mg/dL    Patient Fasting > 8hrs? Unknown    Hemoglobin A1c   Result Value Ref Range    Hemoglobin A1C 5.9 (H) 0.0 - 5.6 %      Comment:      Normal <5.7%   Prediabetes 5.7-6.4%    Diabetes 6.5% or higher     Note: Adopted from ADA consensus guidelines.   Drug abuse screen 8 urine (UR)   Result Value Ref Range    Amphetamines Urine Screen Negative Screen Negative      Comment:      Cutoff for a negative amphetamine is less than 500 ng/mL.    Barbituates Urine Screen Negative Screen Negative      Comment:      Cutoff for a negative barbiturate is less than 200 ng/mL.    Benzodiazepine Urine Screen Negative Screen Negative      Comment:      Cutoff for a negative benzodiazepine is less than 100 ng/mL.    Cannabinoids Urine Screen Negative Screen Negative      Comment:      Cutoff for a negative cannabinoid is less than 50 ng/mL.    Cocaine Urine Screen Negative Screen Negative      Comment:      Cutoff for a negative cocaine is less than 300 ng/mL.    Ethanol Urine Screen Negative Screen Negative      Comment:      Cutoff for a negative urine ethanol is less than 0.05 g/dL.     Opiates Urine Screen Negative Screen Negative      Comment:      Cutoff for a negative opiate is less than 300 ng/mL.    PCP Urine Screen Negative Screen Negative      Comment:      Cutoff for a negative PCP is less than 25 ng/mL.       If you have any questions or concerns, please call the clinic at the number listed above.       Sincerely,      Giles Méndez MD

## 2022-06-23 NOTE — LETTER
[facility]  06/23/22  Patient: Michael Cunningham  YOB: 1980  Medical Record Number: [id number]                                                                                  Non-Opioid Controlled Substance Agreement    This is an agreement between you and your provider regarding safe and appropriate use of controlled substances prescribed by your care team. Controlled substances are?medicines that can cause physical and mental dependence (abuse).     There are strict laws about having and using these medicines. We here at [facility] are  committed to working with you in your efforts to get better. To support you in this work, we'll help you schedule regular office appointments for medicine refills. If we must cancel or change your appointment for any reason, we'll make sure you have enough medicine to last until your next appointment.     As a Provider, I will:     Listen carefully to your concerns while treating you with respect.     Recommend a treatment plan that I believe is in your best interest and may involve therapies other than medicine.      Talk with you often about the possible benefits and the risk of harm of any medicine that we prescribe for you.    Assess the safety of this medicine and check how well it works.      Provide a plan on how to taper (discontinue or go off) using this medicine if the decision is made to stop its use.      ::  As a Patient, I understand controlled substances:       Are prescribed by my care provider to help me function or work and manage my condition(s).?    Are strong medicines and can cause serious side effects.       Need to be taken exactly as prescribed.?Combining controlled substances with certain medicines or chemicals (such as illegal drugs, alcohol, sedatives, sleeping pills, and benzodiazepines) can be dangerous or even fatal.? If I stop taking my medicines suddenly, I may have severe withdrawal symptoms.     The  risks, benefits, and side effects of these medicine(s) were explained to me. I agree that:    1. I will take part in other treatments as advised by my care team. This may be psychiatry or counseling, physical therapy, behavioral therapy, group treatment or a referral to specialist.    2. I will keep all my appointments and understand this is part of the monitoring of controlled substances.?My care team may require an office visit for EVERY controlled substance refill. If I miss appointments or don t follow instructions, my care team may stop my medicine    3. I will take my medicines as prescribed. I will not change the dose or schedule unless my care team tells me to. There will be no refills if I run out early.      4. I may be asked to come to the clinic and complete a urine drug test or complete a pill count. If I don t give a urine sample or participate in a pill count, the care team may stop my medicine.    5. I will only receive controlled substance prescriptions from this clinic. If I am treated by another provider, I will tell them that I am taking controlled substances and that I have a treatment agreement with this provider. I will inform my [facility] care team within one business day if I am given a prescription for any controlled substance by another healthcare provider. My [facility] care team can contact other providers and pharmacists about my use of any medicines.    6. It is up to me to make sure that I don't run out of my medicines on weekends or holidays.?If my care team is willing to refill my prescription without a visit, I must request refills only during office hours. Refills may take up to 3 business days to process. I will use one pharmacy to fill all my controlled substance prescriptions. I will notify the clinic about any changes to my insurance or medicine availability.    7. I am responsible for my prescriptions. If the medicine/prescription is lost, stolen or destroyed,  it will not be replaced.?I also agree not to share controlled substance medicines with anyone.     8. I am aware I should not use any illegal or recreational drugs. I agree not to drink alcohol unless my care team says I can.     9. If I enroll in the [place] Medical Cannabis program, I will tell my care team before my next refill.    10. I will tell my care team right away if I become pregnant, have a new medical problem treated outside of my regular clinic, or have a change in my medicines.     11. I understand that this medicine can affect my thinking, judgment and reaction time.? Alcohol and drugs affect the brain and body, which can affect the safety of my driving. Being under the influence of alcohol or drugs can affect my decision-making, behaviors, personal safety and the safety of others. Driving while impaired (DWI) can occur if a person is driving, operating or in physical control of a car, motorcycle, boat, snowmobile, ATV, motorbike, off-road vehicle or any other motor vehicle ([place] Statute 169A.20). I understand the risk if I choose to drive or operate any vehicle or machinery.    I understand that if I do not follow any of the conditions above, my prescriptions or treatment may be stopped or changed.   I agree that my provider, clinic care team and pharmacy may work with any city, state or federal law enforcement agency that investigates the misuse, sale or other diversion of my controlled medicine. I will allow my provider to discuss my care with, or share a copy of, this agreement with any other treating provider, pharmacy or emergency room where I receive care.     I have read this agreement and have asked questions about anything I did not understand.    ________________________________________________________  Patient Signature - Michael Cunningham     ___________________                   Date     ________________________________________________________  Provider Signature - Giles Méndez MD        ___________________                   Date     ________________________________________________________  Witness Signature (required if provider not present while patient signing)          ___________________                   Date

## 2022-06-23 NOTE — PROGRESS NOTES
Shandra Rodriguez is a 42 year old presenting for the following health issues:  Recheck Medication and Hypertension    Home BPs     Didn't  Adderall on 6-13-22 as the pharmacy didn't notify him.  Finds he needs to be on this.     History of Present Illness       Hypertension: He presents for follow up of hypertension.  He does not check blood pressure  regularly outside of the clinic. Outside blood pressures have been over 140/90. He does not follow a low salt diet.             Objective    /82   Pulse 80   Temp 98.5  F (36.9  C) (Oral)   Resp 12   Wt 145.2 kg (320 lb 3.2 oz)   SpO2 98%   BMI 40.02 kg/m    Body mass index is 40.02 kg/m .     BP Readings from Last 6 Encounters:   06/23/22 133/82   03/18/22 (!) 144/81   12/21/21 (!) 146/90   09/23/21 (!) 126/95   04/15/21 (!) 150/95   02/23/21 (!) 148/92     Physical Exam   GENERAL: healthy, alert and no distress  RESP: lungs clear to auscultation - no rales, rhonchi or wheezes  CV: regular rate and rhythm, normal S1 S2, no S3 or S4, no murmur, click or rub, no peripheral edema and peripheral pulses strong      CBC RESULTS: Recent Labs   Lab Test 09/23/21  1538   WBC 6.7   RBC 5.64   HGB 17.8*   HCT 49.4   MCV 88   MCH 31.6   MCHC 36.0   RDW 12.0          Lab Results   Component Value Date    A1C 5.9 06/23/2022    A1C 5.9 09/23/2021    A1C 5.3 06/29/2020    A1C 5.3 04/02/2019    A1C 5.4 02/26/2016       Lab Results   Component Value Date    A1C 5.9 09/23/2021    A1C 5.3 06/29/2020    A1C 5.3 04/02/2019    A1C 5.4 02/26/2016     Lab Results   Component Value Date    CHOL 135 09/22/2020     Lab Results   Component Value Date    HDL 30 09/22/2020     Lab Results   Component Value Date    LDL 59 09/22/2020     Lab Results   Component Value Date    TRIG 228 09/22/2020     Last Comprehensive Metabolic Panel:  Sodium   Date Value Ref Range Status   09/23/2021 140 136 - 145 mmol/L Final     Potassium   Date Value Ref Range Status   09/23/2021  3.9 3.5 - 5.0 mmol/L Final     Chloride   Date Value Ref Range Status   09/23/2021 103 98 - 107 mmol/L Final     Carbon Dioxide (CO2)   Date Value Ref Range Status   09/23/2021 25 22 - 31 mmol/L Final     Anion Gap   Date Value Ref Range Status   09/23/2021 12 5 - 18 mmol/L Final     Glucose   Date Value Ref Range Status   09/23/2021 95 70 - 125 mg/dL Final     Urea Nitrogen   Date Value Ref Range Status   09/23/2021 11 8 - 22 mg/dL Final     Creatinine   Date Value Ref Range Status   09/23/2021 0.92 0.70 - 1.30 mg/dL Final     GFR Estimate   Date Value Ref Range Status   09/23/2021 >90 >60 mL/min/1.73m2 Final     Comment:     As of July 11, 2021, eGFR is calculated by the CKD-EPI creatinine equation, without race adjustment. eGFR can be influenced by muscle mass, exercise, and diet. The reported eGFR is an estimation only and is only applicable if the renal function is stable.   06/29/2020 >60 >60 mL/min/1.73m2 Final     Calcium   Date Value Ref Range Status   09/23/2021 9.4 8.5 - 10.5 mg/dL Final     Bilirubin Total   Date Value Ref Range Status   09/23/2021 0.8 0.0 - 1.0 mg/dL Final     Alkaline Phosphatase   Date Value Ref Range Status   09/23/2021 67 45 - 120 U/L Final     ALT   Date Value Ref Range Status   09/23/2021 55 (H) 0 - 45 U/L Final     AST   Date Value Ref Range Status   09/23/2021 28 0 - 40 U/L Final           Encounter Diagnoses   Name Primary?     Attention deficit hyperactivity disorder (ADHD), unspecified ADHD type, benefits from Adderall      Controlled substance agreement signed      Essential hypertension, controlled      Screening for HIV (human immunodeficiency virus), universal screen      Need for hepatitis C screening test, universal screen      Hypertriglyceridemia, recheck lipids      Elevated hemoglobin (H), recheck CBC      Prediabetes,stable A1C at 5.9                                         PLAN:        Update the CSA and UDS today    Follow up on Adderall in 6 months    Universal  screening agreed to for HIV and Hep C    Non-fasting labs (last ate about 2-3 hrs ago)        .  ..

## 2022-06-24 LAB — HCV AB SERPL QL IA: NEGATIVE

## 2022-07-25 DIAGNOSIS — F90.9 ATTENTION DEFICIT HYPERACTIVITY DISORDER (ADHD), UNSPECIFIED ADHD TYPE: ICD-10-CM

## 2022-07-25 RX ORDER — DEXTROAMPHETAMINE SULFATE, DEXTROAMPHETAMINE SACCHARATE, AMPHETAMINE SULFATE AND AMPHETAMINE ASPARTATE 5; 5; 5; 5 MG/1; MG/1; MG/1; MG/1
CAPSULE, EXTENDED RELEASE ORAL
Qty: 30 CAPSULE | Refills: 0 | Status: SHIPPED | OUTPATIENT
Start: 2022-07-25 | End: 2022-08-31

## 2022-07-25 NOTE — TELEPHONE ENCOUNTER
Routing refill request to provider for review/approval because:  Drug not on the [facility] refill protocol   No PCP designated    Last Written Prescription Date: 6/13/22  Last Fill Quantity: 30, # refills: 0  Last office visit provider: Honey 6/23/22        Requested Prescriptions   Pending Prescriptions Disp Refills    ADDERALL XR 20 MG 24 hr capsule [Pharmacy Med Name: Adderall XR Oral Capsule Extended Release 24 Hour 20 MG] 30 capsule 0     Sig: TAKE ONE CAPSULE BY MOUTH ONE TIME DAILY.        There is no refill protocol information for this order               Angeles Santos RN 07/25/22 3:40 PM

## 2022-08-13 DIAGNOSIS — I10 HYPERTENSION: ICD-10-CM

## 2022-08-14 RX ORDER — LOSARTAN POTASSIUM 100 MG/1
TABLET ORAL
Qty: 90 TABLET | Refills: 2 | Status: SHIPPED | OUTPATIENT
Start: 2022-08-14 | End: 2023-07-03

## 2022-08-14 NOTE — TELEPHONE ENCOUNTER
"Last Written Prescription Date:  2/28/2022  Last Fill Quantity: 90,  # refills: 1   Last office visit provider:  6/23/2022 Dr. Méndez     Requested Prescriptions   Pending Prescriptions Disp Refills     losartan (COZAAR) 100 MG tablet [Pharmacy Med Name: Losartan Potassium Oral Tablet 100 MG] 90 tablet 0     Sig: Take 1 tablet by mouth daily.       Angiotensin-II Receptors Passed - 8/13/2022  1:53 PM        Passed - Last blood pressure under 140/90 in past 12 months     BP Readings from Last 3 Encounters:   06/23/22 133/82   03/18/22 (!) 144/81   12/21/21 (!) 146/90                 Passed - Recent (12 mo) or future (30 days) visit within the authorizing provider's specialty     Patient has had an office visit with the authorizing provider or a provider within the authorizing providers department within the previous 12 mos or has a future within next 30 days. See \"Patient Info\" tab in inbasket, or \"Choose Columns\" in Meds & Orders section of the refill encounter.              Passed - Medication is active on med list        Passed - Patient is age 18 or older        Passed - Normal serum creatinine on file in past 12 months     Recent Labs   Lab Test 06/23/22  1541   CR 0.93       Ok to refill medication if creatinine is low          Passed - Normal serum potassium on file in past 12 months     Recent Labs   Lab Test 06/23/22  1541   POTASSIUM 3.7                         Elsa Burris RN 08/14/22 2:28 PM  "

## 2022-08-31 DIAGNOSIS — F90.9 ATTENTION DEFICIT HYPERACTIVITY DISORDER (ADHD), UNSPECIFIED ADHD TYPE: ICD-10-CM

## 2022-08-31 RX ORDER — DEXTROAMPHETAMINE SULFATE, DEXTROAMPHETAMINE SACCHARATE, AMPHETAMINE SULFATE AND AMPHETAMINE ASPARTATE 5; 5; 5; 5 MG/1; MG/1; MG/1; MG/1
CAPSULE, EXTENDED RELEASE ORAL
Qty: 30 CAPSULE | Refills: 0 | Status: SHIPPED | OUTPATIENT
Start: 2022-08-31 | End: 2022-10-10

## 2022-10-10 DIAGNOSIS — F90.9 ATTENTION DEFICIT HYPERACTIVITY DISORDER (ADHD), UNSPECIFIED ADHD TYPE: ICD-10-CM

## 2022-10-10 RX ORDER — DEXTROAMPHETAMINE SULFATE, DEXTROAMPHETAMINE SACCHARATE, AMPHETAMINE SULFATE AND AMPHETAMINE ASPARTATE 5; 5; 5; 5 MG/1; MG/1; MG/1; MG/1
CAPSULE, EXTENDED RELEASE ORAL
Qty: 30 CAPSULE | Refills: 0 | Status: SHIPPED | OUTPATIENT
Start: 2022-10-10 | End: 2022-11-14

## 2022-10-19 ENCOUNTER — VIRTUAL VISIT (OUTPATIENT)
Dept: URGENT CARE | Facility: CLINIC | Age: 42
End: 2022-10-19
Payer: COMMERCIAL

## 2022-10-19 DIAGNOSIS — U07.1 INFECTION DUE TO 2019 NOVEL CORONAVIRUS: Primary | ICD-10-CM

## 2022-10-19 DIAGNOSIS — I10 BENIGN ESSENTIAL HYPERTENSION: ICD-10-CM

## 2022-10-19 PROCEDURE — 99213 OFFICE O/P EST LOW 20 MIN: CPT | Mod: CS

## 2022-10-19 RX ORDER — BENZONATATE 100 MG/1
100 CAPSULE ORAL 3 TIMES DAILY PRN
Qty: 16 CAPSULE | Refills: 0 | Status: SHIPPED | OUTPATIENT
Start: 2022-10-19 | End: 2023-02-13

## 2022-10-19 RX ORDER — AMLODIPINE BESYLATE 5 MG/1
5 TABLET ORAL DAILY
Qty: 5 TABLET | Refills: 0 | Status: SHIPPED | OUTPATIENT
Start: 2022-10-19 | End: 2023-01-11

## 2022-10-19 NOTE — PROGRESS NOTES
Michael Cunningham is being evaluated via a billable video visit.      Assessment & Plan:     Pt eligible for COVID treatment due to: obesity, HTN.  Rx Paxlovid.  Also Rx Tessalon perles for cough.  Advised to reduce amlodipine dose from 10 mg to 5 mg daily- new Rx sent for 5 day supply of 5 mg dose.    See patient instructions below.  At the end of the encounter, I discussed results, diagnosis, medications. Discussed red flags for being seen in person at clinic/ER, as well as indications for follow up if no improvement. Patient understood and agreed to plan.       ICD-10-CM    1. Infection due to 2019 novel coronavirus  U07.1 benzonatate (TESSALON) 100 MG capsule     nirmatrelvir and ritonavir (PAXLOVID) therapy pack      2. Benign essential hypertension  I10 amLODIPine (NORVASC) 5 MG tablet            No follow-ups on file.    Video-Visit Details    Type of service:  Video Visit    Video Start Time: 1:58pm  Video End Time: 2:05pm    Originating Location (pt. Location): Home    Distant Location (provider location):  2d2c VIRTUAL URGENT CARE     Mode of Communication:  Video Conference via [facility]    DANY Muñoz, PASera[facility]  [facility]    Subjective:   Michael Cunningham is a 42 year old male who is contacted via telephone thru scheduled urgent care virtual visit to discuss:   Chief Complaint   Patient presents with     Covid Concern       Cough, congestion, fatigue, fever, & chills onset 2 days ago. He tested positive for COVID on 10/18/22. Cough is worse at night and making it difficult to sleep. No treatments tried. Patient reports no headache, chest pain, shortness of breath, abdominal pain, nausea, vomiting, diarrhea, rash, or any other symptoms.     Past Medical History:   Diagnosis Date     ADHD      HTN (hypertension)        Objective:   Gen:  NAD  Pulm: non-labored work of breathing    No results found for any visits on 10/19/22.    There are no Patient Instructions on file  for this visit.

## 2022-11-14 DIAGNOSIS — F90.9 ATTENTION DEFICIT HYPERACTIVITY DISORDER (ADHD), UNSPECIFIED ADHD TYPE: ICD-10-CM

## 2022-11-14 RX ORDER — DEXTROAMPHETAMINE SULFATE, DEXTROAMPHETAMINE SACCHARATE, AMPHETAMINE SULFATE AND AMPHETAMINE ASPARTATE 5; 5; 5; 5 MG/1; MG/1; MG/1; MG/1
CAPSULE, EXTENDED RELEASE ORAL
Qty: 30 CAPSULE | Refills: 0 | Status: SHIPPED | OUTPATIENT
Start: 2022-11-14 | End: 2022-12-16

## 2023-01-11 DIAGNOSIS — F90.9 ATTENTION DEFICIT HYPERACTIVITY DISORDER (ADHD), UNSPECIFIED ADHD TYPE: ICD-10-CM

## 2023-01-11 RX ORDER — DEXTROAMPHETAMINE SULFATE, DEXTROAMPHETAMINE SACCHARATE, AMPHETAMINE SULFATE AND AMPHETAMINE ASPARTATE 5; 5; 5; 5 MG/1; MG/1; MG/1; MG/1
CAPSULE, EXTENDED RELEASE ORAL
Qty: 30 CAPSULE | Refills: 0 | Status: SHIPPED | OUTPATIENT
Start: 2023-01-13 | End: 2023-02-13

## 2023-01-11 NOTE — TELEPHONE ENCOUNTER
Called and informed patient of message below. Patient states that he is fine with the early refill on 01/13.   He is scheduled for a med check on 02/13.     No call back is needed unless there are further questions.

## 2023-01-11 NOTE — TELEPHONE ENCOUNTER
rx is due on 1-15-23  The insurance and pharmacy may allow a refill 1-2 days early but not more.  If I put in a refill for 1-13-23 then the next refill would be due 2-24-23.    Let me know if this works.  Also the 6 month controlled substance visit is over due.  This may be a phone call or virtual visit.

## 2023-01-11 NOTE — TELEPHONE ENCOUNTER
Patient calling in to see if he can get an early fill of his adderall medication. Patient will be on vacation and will run out of his medication mid way through his vacation. Please advise.       Medication: Adderall     Last Date Filled 12/16/2022     pulled: NO      Only PCP Prescribing?: YES    Taken as prescribed from physician notes?: YES    CSA in last year: YES    Random Utox in last year: YES    Opioids + benzodiazepines? NO

## 2023-02-13 ENCOUNTER — VIRTUAL VISIT (OUTPATIENT)
Dept: FAMILY MEDICINE | Facility: CLINIC | Age: 43
End: 2023-02-13
Payer: COMMERCIAL

## 2023-02-13 DIAGNOSIS — F90.9 ATTENTION DEFICIT HYPERACTIVITY DISORDER (ADHD), UNSPECIFIED ADHD TYPE: ICD-10-CM

## 2023-02-13 DIAGNOSIS — I10 ESSENTIAL HYPERTENSION: ICD-10-CM

## 2023-02-13 PROCEDURE — 99213 OFFICE O/P EST LOW 20 MIN: CPT | Performed by: FAMILY MEDICINE

## 2023-02-13 RX ORDER — AMLODIPINE BESYLATE 10 MG/1
10 TABLET ORAL DAILY
Qty: 90 TABLET | Refills: 3 | Status: SHIPPED | OUTPATIENT
Start: 2023-02-13 | End: 2023-05-01

## 2023-02-13 RX ORDER — EMTRICITABINE AND TENOFOVIR DISOPROXIL FUMARATE 200; 300 MG/1; MG/1
1 TABLET, FILM COATED ORAL
COMMUNITY
Start: 2023-01-24 | End: 2023-05-01

## 2023-02-13 RX ORDER — DEXTROAMPHETAMINE SACCHARATE, AMPHETAMINE ASPARTATE MONOHYDRATE, DEXTROAMPHETAMINE SULFATE AND AMPHETAMINE SULFATE 5; 5; 5; 5 MG/1; MG/1; MG/1; MG/1
20 CAPSULE, EXTENDED RELEASE ORAL DAILY
Qty: 30 CAPSULE | Refills: 0 | Status: SHIPPED | OUTPATIENT
Start: 2023-02-13 | End: 2023-03-16

## 2023-02-13 NOTE — PATIENT INSTRUCTIONS
Renew CSA and UDS in June 2023    PDMP shows appropriate use of Adderall  Refill sent on Adderall    Keep an eye on BPs.   Avoid salt  Work on weight reducdtion  Goal DBP < 90  Nurse BP check  Refill sent on amlodipine

## 2023-02-13 NOTE — PROGRESS NOTES
Michael is a 42 year old who is being evaluated via a billable telephone visit.      What phone number would you like to be contacted at? [phone]  How would you like to obtain your AVS? Haritha      Shandra Rodriguez is a 42 year old, presenting for the following health issues:  Recheck Medication (Follow up Adderall XR 20 mg daily)      HPI     Hypertension Follow-up       Do you check your blood pressure regularly outside of the clinic? Yes     Are you following a low salt diet? Yes    Are your blood pressures ever more than 140 on the top number (systolic) OR more than 90 on the bottom number (diastolic), for example 140/90? Yes    Blood pressures running 120-130/90    [facility] as he is dating again.        Objective    Vitals - Patient Reported  Systolic (Patient Reported): 130  Diastolic (Patient Reported): (!) 90      Vitals:  No vitals were obtained today due to virtual visit.    Physical Exam   healthy, alert and no distress  PSYCH: Alert and oriented times 3; coherent speech, normal   rate and volume, able to articulate logical thoughts, able   to abstract reason, no tangential thoughts, no hallucinations   or delusions  His affect is normal  RESP: No cough, no audible wheezing, able to talk in full sentences  Remainder of exam unable to be completed due to telephone visits    Encounter Diagnoses   Name Primary?     Attention deficit hyperactivity disorder (ADHD), unspecified ADHD type      Essential hypertension       PLAN:   Renew CSA and UDS in June 2023    PDMP shows appropriate use of Adderall  Refill sent on Adderall    Keep an eye on BPs.   Avoid salt  Work on weight reducdtion  Goal DBP < 90  Nurse BP check  Refill sent on amlodipine        Phone call duration: 9 minutes

## 2023-03-15 DIAGNOSIS — F90.9 ATTENTION DEFICIT HYPERACTIVITY DISORDER (ADHD), UNSPECIFIED ADHD TYPE: ICD-10-CM

## 2023-03-16 RX ORDER — DEXTROAMPHETAMINE SULFATE, DEXTROAMPHETAMINE SACCHARATE, AMPHETAMINE SULFATE AND AMPHETAMINE ASPARTATE 5; 5; 5; 5 MG/1; MG/1; MG/1; MG/1
CAPSULE, EXTENDED RELEASE ORAL
Qty: 30 CAPSULE | Refills: 0 | Status: SHIPPED | OUTPATIENT
Start: 2023-03-16 | End: 2023-04-18

## 2023-04-17 DIAGNOSIS — F90.9 ATTENTION DEFICIT HYPERACTIVITY DISORDER (ADHD), UNSPECIFIED ADHD TYPE: ICD-10-CM

## 2023-04-18 RX ORDER — DEXTROAMPHETAMINE SULFATE, DEXTROAMPHETAMINE SACCHARATE, AMPHETAMINE SULFATE AND AMPHETAMINE ASPARTATE 5; 5; 5; 5 MG/1; MG/1; MG/1; MG/1
CAPSULE, EXTENDED RELEASE ORAL
Qty: 30 CAPSULE | Refills: 0 | Status: SHIPPED | OUTPATIENT
Start: 2023-04-18 | End: 2023-05-17

## 2023-04-23 ENCOUNTER — HEALTH MAINTENANCE LETTER (OUTPATIENT)
Age: 43
End: 2023-04-23

## 2023-05-01 ENCOUNTER — OFFICE VISIT (OUTPATIENT)
Dept: FAMILY MEDICINE | Facility: CLINIC | Age: 43
End: 2023-05-01
Payer: COMMERCIAL

## 2023-05-01 VITALS
OXYGEN SATURATION: 95 % | DIASTOLIC BLOOD PRESSURE: 83 MMHG | TEMPERATURE: 98.2 F | HEIGHT: 75 IN | RESPIRATION RATE: 20 BRPM | HEART RATE: 81 BPM | SYSTOLIC BLOOD PRESSURE: 128 MMHG | WEIGHT: 315 LBS | BODY MASS INDEX: 39.17 KG/M2

## 2023-05-01 DIAGNOSIS — I10 ESSENTIAL HYPERTENSION: ICD-10-CM

## 2023-05-01 DIAGNOSIS — Z00.00 HEALTHCARE MAINTENANCE: ICD-10-CM

## 2023-05-01 DIAGNOSIS — Z79.899 CONTROLLED SUBSTANCE AGREEMENT SIGNED: ICD-10-CM

## 2023-05-01 DIAGNOSIS — F90.9 ATTENTION DEFICIT HYPERACTIVITY DISORDER (ADHD), UNSPECIFIED ADHD TYPE: ICD-10-CM

## 2023-05-01 LAB
ANION GAP SERPL CALCULATED.3IONS-SCNC: 11 MMOL/L (ref 7–15)
BUN SERPL-MCNC: 10.4 MG/DL (ref 6–20)
CALCIUM SERPL-MCNC: 9.5 MG/DL (ref 8.6–10)
CHLORIDE SERPL-SCNC: 102 MMOL/L (ref 98–107)
CREAT SERPL-MCNC: 1.01 MG/DL (ref 0.67–1.17)
DEPRECATED HCO3 PLAS-SCNC: 26 MMOL/L (ref 22–29)
GFR SERPL CREATININE-BSD FRML MDRD: >90 ML/MIN/1.73M2
GLUCOSE SERPL-MCNC: 131 MG/DL (ref 70–99)
POTASSIUM SERPL-SCNC: 4.2 MMOL/L (ref 3.4–5.3)
SODIUM SERPL-SCNC: 139 MMOL/L (ref 136–145)
URATE SERPL-MCNC: 5 MG/DL (ref 3.4–7)

## 2023-05-01 PROCEDURE — 80307 DRUG TEST PRSMV CHEM ANLYZR: CPT | Performed by: FAMILY MEDICINE

## 2023-05-01 PROCEDURE — 99214 OFFICE O/P EST MOD 30 MIN: CPT | Performed by: FAMILY MEDICINE

## 2023-05-01 PROCEDURE — 84550 ASSAY OF BLOOD/URIC ACID: CPT | Performed by: FAMILY MEDICINE

## 2023-05-01 PROCEDURE — 80048 BASIC METABOLIC PNL TOTAL CA: CPT | Performed by: FAMILY MEDICINE

## 2023-05-01 PROCEDURE — 36415 COLL VENOUS BLD VENIPUNCTURE: CPT | Performed by: FAMILY MEDICINE

## 2023-05-01 RX ORDER — AMLODIPINE BESYLATE 5 MG/1
5 TABLET ORAL DAILY
Qty: 30 TABLET | Refills: 1 | Status: SHIPPED | OUTPATIENT
Start: 2023-05-01 | End: 2023-06-30

## 2023-05-01 RX ORDER — HYDROCHLOROTHIAZIDE 12.5 MG/1
12.5 TABLET ORAL DAILY
Qty: 30 TABLET | Refills: 0 | Status: SHIPPED | OUTPATIENT
Start: 2023-05-01 | End: 2023-06-01

## 2023-05-01 NOTE — LETTER
May 2, 2023      Michael Cunningham  [address] [place] [address]        Dear ,  We are writing to inform you of your test results.    Dakota Rodriguez:  Your urine drug screen is consistent with prescribed medication.  Your uric acid is normal. This leaves open the possibility of adding in hydrochlorothiazide at  a low dose for your BP treatment if needed.  The blood sugar is slightly up and I added an A1C (at next blood draw)  to check on your blood sugar control over the past 3 months.  The remaining labs are normal.    Resulted Orders   Drug abuse screen 8 urine (UR)   Result Value Ref Range    Amphetamines Urine Screen Positive (A) Screen Negative      Comment:      Cutoff for a positive amphetamine is 500 ng/mL or greater.   This is an unconfirmed screening result to be used for medical purposes only.    Barbituates Urine Screen Negative Screen Negative      Comment:      Cutoff for a negative barbiturate is less than 200 ng/mL.    Benzodiazepine Urine Screen Negative Screen Negative      Comment:      Cutoff for a negative benzodiazepine is less than 100 ng/mL.    Cannabinoids Urine Screen Negative Screen Negative      Comment:      Cutoff for a negative cannabinoid is less than 50 ng/mL.    Cocaine Urine Screen Negative Screen Negative      Comment:      Cutoff for a negative cocaine is less than 300 ng/mL.    Ethanol Urine Screen Negative Screen Negative      Comment:      Cutoff for a negative urine ethanol is less than 0.05 g/dL.    Opiates Urine Screen Negative Screen Negative      Comment:      Cutoff for a negative opiate is less than 300 ng/mL.    PCP Urine Screen Negative Screen Negative      Comment:      Cutoff for a negative PCP is less than 25 ng/mL.   Uric acid   Result Value Ref Range    Uric Acid 5.0 3.4 - 7.0 mg/dL   Basic metabolic panel  (Ca, Cl, CO2, Creat, Gluc, K, Na, BUN)   Result Value Ref Range    Sodium 139 136 - 145 mmol/L    Potassium 4.2 3.4 - 5.3 mmol/L    Chloride 102 98 -  107 mmol/L    Carbon Dioxide (CO2) 26 22 - 29 mmol/L    Anion Gap 11 7 - 15 mmol/L    Urea Nitrogen 10.4 6.0 - 20.0 mg/dL    Creatinine 1.01 0.67 - 1.17 mg/dL    Calcium 9.5 8.6 - 10.0 mg/dL    Glucose 131 (H) 70 - 99 mg/dL    GFR Estimate >90 >60 mL/min/1.73m2      Comment:      eGFR calculated using 2021 CKD-EPI equation.       If you have any questions or concerns, please call the clinic at the number listed above.       Sincerely,      Giles Méndez MD

## 2023-05-01 NOTE — PATIENT INSTRUCTIONS
Decrease amlodipine to 5 mg daily (GOAL:  to phase out the amlodipine)    Start hydrochlorothiazide 12.5 mg daily in the morning.  Nurse bp CHECK IN A WEEK.  FOLLOW UP IN 3-4 WEEKS.    Renew CSA and UDS

## 2023-05-01 NOTE — PROGRESS NOTES
"      Shandra Rodriguez is a 42 year old, presenting for the following health issues:  Medication Problem (Swelling in legs possible side effect from amlodipine)      History of Present Illness       Reason for visit:  Mediction issue possibly    He eats 2-3 servings of fruits and vegetables daily.He consumes 0 sweetened beverage(s) daily.He exercises with enough effort to increase his heart rate 20 to 29 minutes per day.  He exercises with enough effort to increase his heart rate 5 days per week.   He is taking medications regularly.     \"I am retaining fluid so well\"  Limiting sodium in his diet.   On 3-18-22 amlodipine was doubled up to 10 mg daily.  No alcohol  No chest pain or SHORTNESS OF BREATH.     Takes Adderall daily and it is very helpful.   PDMP reviewed and shows appropriate use.        Objective    /83   Pulse 81   Temp 98.2  F (36.8  C) (Oral)   Resp 20   Ht 1.905 m (6' 3\")   Wt 148.3 kg (327 lb)   SpO2 95%   BMI 40.87 kg/m    Body mass index is 40.87 kg/m .   Wt Readings from Last 4 Encounters:   05/01/23 148.3 kg (327 lb)   06/23/22 145.2 kg (320 lb 3.2 oz)   09/23/21 144.2 kg (318 lb)   02/23/21 149.1 kg (328 lb 9.6 oz)     Physical Exam   GENERAL: healthy, alert and no distress  RESP: lungs clear to auscultation - no rales, rhonchi or wheezes  CV: regular rate and rhythm, normal S1 S2, no S3 or S4, no murmur, click or rub, no peripheral edema and peripheral pulses strong  MS: no gross musculoskeletal defects noted, no edema;  Mild peripheral edema    Last Comprehensive Metabolic Panel:  Lab Results   Component Value Date     06/23/2022    POTASSIUM 3.7 06/23/2022    CHLORIDE 104 06/23/2022    CO2 24 06/23/2022    ANIONGAP 13 06/23/2022     06/23/2022    BUN 14 06/23/2022    CR 0.93 06/23/2022    GFRESTIMATED >90 06/23/2022    EYAD 9.5 06/23/2022     Encounter Diagnoses   Name Primary?     Essential hypertension, adverse rxn to amlodipine, decrease amlodipine and add " hydrochlorothiazide, check BMP      Healthcare maintenance,  Check uric acid baseline      Controlled substance agreement signed      Attention deficit hyperactivity disorder (ADHD), unspecified ADHD type, stable            PLAN:   Decrease amlodipine to 5 mg daily (GOAL:  to phase out the amlodipine)    Start hydrochlorothiazide 12.5 mg daily in the morning.  Nurse bp CHECK IN A WEEK.  FOLLOW UP IN 3-4 WEEKS.    Renew CSA and UDS  Check BMP today  Check baseline uric acid today (due to starting a low dose of hydrochlorothiazide)

## 2023-05-02 DIAGNOSIS — R73.9 ELEVATED BLOOD SUGAR: Primary | ICD-10-CM

## 2023-05-02 LAB
AMPHETAMINES UR QL SCN: ABNORMAL
BARBITURATES UR QL SCN: ABNORMAL
BENZODIAZ UR QL SCN: ABNORMAL
BZE UR QL SCN: ABNORMAL
CANNABINOIDS UR QL SCN: ABNORMAL
ETHANOL UR QL SCN: ABNORMAL
OPIATES UR QL SCN: ABNORMAL
PCP QUAL URINE (ROCHE): ABNORMAL

## 2023-05-09 ENCOUNTER — ALLIED HEALTH/NURSE VISIT (OUTPATIENT)
Dept: FAMILY MEDICINE | Facility: CLINIC | Age: 43
End: 2023-05-09
Payer: COMMERCIAL

## 2023-05-09 VITALS — HEART RATE: 77 BPM | SYSTOLIC BLOOD PRESSURE: 133 MMHG | DIASTOLIC BLOOD PRESSURE: 81 MMHG

## 2023-05-09 DIAGNOSIS — I10 ESSENTIAL HYPERTENSION: Primary | ICD-10-CM

## 2023-05-09 PROCEDURE — 99207 PR NO CHARGE NURSE ONLY: CPT

## 2023-05-09 NOTE — PROGRESS NOTES
Follow Up Blood Pressure Check    Michael Cunningham is a 43 year old male recommended to follow up for blood pressure check by Giles Méndez Anihypertensive medications and adherence were verified: Yes.     Reason for visit: Recheck BP, med change    Medication change at last visit: Had adverse rxn to amlodipine, decrease amlodipine to 5 mg and add hydrochlorothiazide 12.5 mg     Per patient he is feeling better and is not retaining any water.     Today's Vitals:   Vitals:    05/09/23 1353   BP: 133/81   BP Location: Left arm   Patient Position: Sitting   Cuff Size: Adult Large   Pulse: 77       Home blood pressure readings brought in today:    NO     Lowest blood pressure today is less than 140/90 and they deny signs or symptoms of new onset: severe headache, fatigue, confusion, vision changes, chest pain, pounding in the chest, neck, ears, irregular heartbeat, difficulty breathing and blood in the urine.  Please inform patient of his/her blood pressure today.  If they are asymptomatic, the patient is to continue current medications.  This message will be routed to their provider, and they will be notified if a change in medication is recommended.        Milena Dia    Current Outpatient Medications   Medication Sig Dispense Refill     ADDERALL XR 20 MG 24 hr capsule Take 1 capsule by mouth daily. 30 capsule 0     amLODIPine (NORVASC) 5 MG tablet Take 1 tablet (5 mg) by mouth daily 30 tablet 1     hydrochlorothiazide (HYDRODIURIL) 12.5 MG tablet Take 1 tablet (12.5 mg) by mouth daily 30 tablet 0     losartan (COZAAR) 100 MG tablet Take 1 tablet by mouth daily. 90 tablet 2

## 2023-05-10 ENCOUNTER — TELEPHONE (OUTPATIENT)
Dept: FAMILY MEDICINE | Facility: CLINIC | Age: 43
End: 2023-05-10
Payer: COMMERCIAL

## 2023-05-10 NOTE — TELEPHONE ENCOUNTER
LMTCB, Please see message below from provider.        Giles Méndez MD [facility] Care Team Pool  Please see me back for a BP check and labs in a couple weeks.

## 2023-05-12 DIAGNOSIS — I10 ESSENTIAL HYPERTENSION: ICD-10-CM

## 2023-05-12 RX ORDER — AMLODIPINE BESYLATE 10 MG/1
10 TABLET ORAL DAILY
Qty: 90 TABLET | Refills: 0 | OUTPATIENT
Start: 2023-05-12

## 2023-05-12 NOTE — TELEPHONE ENCOUNTER
Medication dose decreased and this dose discontinued.    Medication refused.    Mel Perez RN on 5/12/2023 at 3:34 PM

## 2023-05-17 DIAGNOSIS — F90.9 ATTENTION DEFICIT HYPERACTIVITY DISORDER (ADHD), UNSPECIFIED ADHD TYPE: ICD-10-CM

## 2023-05-17 RX ORDER — DEXTROAMPHETAMINE SULFATE, DEXTROAMPHETAMINE SACCHARATE, AMPHETAMINE SULFATE AND AMPHETAMINE ASPARTATE 5; 5; 5; 5 MG/1; MG/1; MG/1; MG/1
CAPSULE, EXTENDED RELEASE ORAL
Qty: 30 CAPSULE | Refills: 0 | Status: SHIPPED | OUTPATIENT
Start: 2023-05-18 | End: 2023-06-16

## 2023-05-23 ENCOUNTER — TELEPHONE (OUTPATIENT)
Dept: FAMILY MEDICINE | Facility: CLINIC | Age: 43
End: 2023-05-23

## 2023-05-23 ENCOUNTER — ALLIED HEALTH/NURSE VISIT (OUTPATIENT)
Dept: FAMILY MEDICINE | Facility: CLINIC | Age: 43
End: 2023-05-23
Payer: COMMERCIAL

## 2023-05-23 ENCOUNTER — LAB (OUTPATIENT)
Dept: LAB | Facility: CLINIC | Age: 43
End: 2023-05-23
Payer: COMMERCIAL

## 2023-05-23 VITALS — HEART RATE: 79 BPM | DIASTOLIC BLOOD PRESSURE: 86 MMHG | SYSTOLIC BLOOD PRESSURE: 125 MMHG

## 2023-05-23 DIAGNOSIS — I10 ESSENTIAL HYPERTENSION: Primary | ICD-10-CM

## 2023-05-23 DIAGNOSIS — R73.9 ELEVATED BLOOD SUGAR: ICD-10-CM

## 2023-05-23 LAB — HBA1C MFR BLD: 6 % (ref 0–5.6)

## 2023-05-23 PROCEDURE — 83036 HEMOGLOBIN GLYCOSYLATED A1C: CPT

## 2023-05-23 PROCEDURE — 99207 PR NO CHARGE NURSE ONLY: CPT

## 2023-05-23 PROCEDURE — 36415 COLL VENOUS BLD VENIPUNCTURE: CPT

## 2023-05-23 NOTE — TELEPHONE ENCOUNTER
Called and informed patient of message below.    Giles Méndez MD at 5/23/2023  2:00 PM    Status: Signed   The BP looks good.  See me in 3-4 weeks for a BP recheck and to discuss any med changes.

## 2023-05-23 NOTE — PROGRESS NOTES
Follow Up Blood Pressure Check    Michael Cunningham is a 43 year old male recommended to follow up for blood pressure check by Giles Méndez Anihypertensive medications and adherence were verified: Yes.     Reason for visit: Bp check     Medication change at last visit: Start hydrochlorothiazide 12.5 mg, Decrease amlodipine to 5 mg daily (GOAL:  to phase out the amlodipine)    Today's Vitals:   Vitals:    05/23/23 1347 05/23/23 1356   BP: (!) 141/85 125/86   BP Location: Left arm Left arm   Patient Position: Sitting Sitting   Cuff Size: Adult Large Adult Large   Pulse: 75 79           Lowest blood pressure today is 125/86 and they deny signs or symptoms of new onset: severe headache, fatigue, confusion, vision changes, chest pain, pounding in the chest, neck, ears, irregular heartbeat, difficulty breathing and blood in the urine.  Please inform patient of his/her blood pressure today.  If they are asymptomatic, the patient is to continue current medications.  This message will be routed to their provider, and they will be notified if a change in medication is recommended.        Leydi Borrego    Current Outpatient Medications   Medication Sig Dispense Refill     ADDERALL XR 20 MG 24 hr capsule Take 1 capsule by mouth daily. 30 capsule 0     amLODIPine (NORVASC) 5 MG tablet Take 1 tablet (5 mg) by mouth daily 30 tablet 1     hydrochlorothiazide (HYDRODIURIL) 12.5 MG tablet Take 1 tablet (12.5 mg) by mouth daily 30 tablet 0     losartan (COZAAR) 100 MG tablet Take 1 tablet by mouth daily. 90 tablet 2

## 2023-05-26 ENCOUNTER — OFFICE VISIT (OUTPATIENT)
Dept: FAMILY MEDICINE | Facility: CLINIC | Age: 43
End: 2023-05-26
Payer: COMMERCIAL

## 2023-05-26 ENCOUNTER — NURSE TRIAGE (OUTPATIENT)
Dept: NURSING | Facility: CLINIC | Age: 43
End: 2023-05-26
Payer: COMMERCIAL

## 2023-05-26 VITALS
RESPIRATION RATE: 20 BRPM | HEART RATE: 78 BPM | SYSTOLIC BLOOD PRESSURE: 139 MMHG | DIASTOLIC BLOOD PRESSURE: 88 MMHG | OXYGEN SATURATION: 97 % | TEMPERATURE: 97.8 F | BODY MASS INDEX: 38.25 KG/M2 | WEIGHT: 306 LBS

## 2023-05-26 DIAGNOSIS — S01.81XA FACIAL LACERATION, INITIAL ENCOUNTER: Primary | ICD-10-CM

## 2023-05-26 PROCEDURE — 90471 IMMUNIZATION ADMIN: CPT | Performed by: PHYSICIAN ASSISTANT

## 2023-05-26 PROCEDURE — 99212 OFFICE O/P EST SF 10 MIN: CPT | Mod: 25 | Performed by: PHYSICIAN ASSISTANT

## 2023-05-26 PROCEDURE — 90715 TDAP VACCINE 7 YRS/> IM: CPT | Performed by: PHYSICIAN ASSISTANT

## 2023-05-26 NOTE — PROGRESS NOTES
Chief Complaint   Patient presents with     Laceration     Stabbed cupid's bow pliers handle end while fixing car x prior to arrival to [facility]. No pain, tenderness, was swelling but gone down per pt. Little bleeding.       ASSESSMENT/PLAN:  Michael was seen today for laceration.    Diagnoses and all orders for this visit:    Facial laceration, initial encounter    Other orders  -     TDAP VACCINE (Adacel, Boostrix)  [[id number]]    Only partial thickness laceration less than 1 cm in length should heal well without primary closure.  Has some underlying swelling.  Recommend icing.  Daily gentle wound cleaning and bandaging  Updating tetanus today    Ru Mendes PA-C      SUBJECTIVE:  Michael is a 43 year old male who presents to urgent care with a laceration above his lip when he hit his face with the butt end of a pliers.  Its been bleeding since.  He did wash it out with some warm soap and water    ROS: Pertinent ROS neg other than the symptoms noted above in the HPI.     OBJECTIVE:  /88 (BP Location: Right arm, Patient Position: Sitting, Cuff Size: Adult Large)   Pulse 78   Temp 97.8  F (36.6  C) (Oral)   Resp 20   Wt 138.8 kg (306 lb)   SpO2 97%   BMI 38.25 kg/m     GENERAL: healthy, alert and no distress  HENT: No teeth fracture, abrasion and swelling of the upper lip  SKIN: 0.5 cm laceration above the lip and the frenulum, partial-thickness    DIAGNOSTICS    No results found for any visits on 05/26/23.     Current Outpatient Medications   Medication     ADDERALL XR 20 MG 24 hr capsule     amLODIPine (NORVASC) 5 MG tablet     hydrochlorothiazide (HYDRODIURIL) 12.5 MG tablet     losartan (COZAAR) 100 MG tablet     No current facility-administered medications for this visit.      Patient Active Problem List   Diagnosis     ADHD (attention deficit hyperactivity disorder)     Hypertension     Controlled substance agreement signed     Hypertriglyceridemia     LINDA (obstructive sleep apnea)     Prediabetes      Pulmonary nodule     Morbid obesity (H)      Past Medical History:   Diagnosis Date     ADHD      HTN (hypertension)      Past Surgical History:   Procedure Laterality Date     OTHER SURGICAL HISTORY      nose      Family History   Problem Relation Age of Onset     Snoring Mother      No Known Problems Mother      Heart Disease Father      No Known Problems Sister      Cancer Maternal Grandfather         smoker, lung cancer     Cancer Paternal Grandmother         Brain , 58 years      Cancer Paternal Grandfather         lung, cancer     Social History     Tobacco Use     Smoking status: Former     Passive exposure: Never     Smokeless tobacco: Current     Types: Chew   Vaping Use     Vaping status: Not on file   Substance Use Topics     Alcohol use: Not Currently              The plan of care was discussed with the patient. They understand and agree with the course of treatment prescribed. A printed summary was given including instructions and medications.  The use of Dragon/appCREAR dictation services may have been used to construct the content in this note; any grammatical or spelling errors are non-intentional. Please contact the author of this note directly if you are in need of any clarification.

## 2023-05-26 NOTE — TELEPHONE ENCOUNTER
Nurse Triage SBAR    Is this a 2nd Level Triage? NO    Situation: Patient injured his upper lip with pliers working on a car    Background: injury happened 10 min ago     Assessment: working on a car and had a pair of pliers and was pulling on a clip- lost control of the pliers and punctured his upper lip   No chipped teeth  States bleeding is controlled but lip is swollen 2x larger than lip would normally be  States not painful   Last tetanus 2017    Protocol Recommended Disposition:   See in Office Today or Tomorrow    Recommendation: Advised to be seen in the clinic today or tomorrow- reviewed additional care advice with patient and he verbalizes understanding. Prefers to go to [facility] today- reviewed locations. [place].      [facility] today    Does the patient meet one of the following criteria for ADS visit consideration? 16+ years old, with an [facility] PCP     TIP  Providers, please consider if this condition is appropriate for management at one of our Acute and Diagnostic Services sites.     If patient is a good candidate, please use dotphrase <dot>triageresponse and select Refer to ADS to document.    Reason for Disposition    Last tetanus shot > 5 years ago and DIRTY cut or scrape    Additional Information    Negative: Major bleeding (actively dripping or spurting) that can't be stopped    Negative: Bullet, knife or other serious penetrating wound    Negative: Difficulty breathing or choking    Negative: Knocked out (unconscious) > 1 minute    Negative: Difficult to awaken or acting confused (e.g., disoriented, slurred speech)    Negative: Severe neck pain    Negative: Sounds like a life-threatening emergency to the triager    Negative: Head or forehead injury is main concern    Negative: Neck injury is main concern    Negative: Eye or orbit injury is main concern    Negative: Ear injury is main concern    Negative: Mouth injury is main concern    Negative: Nose injury is main concern    Negative: Teeth or tooth  injury is main concern    Negative: Wound looks infected    Negative: Bleeding won't stop after 10 minutes of direct pressure (using correct technique)    Negative: Skin is split open or gaping (or length > 1/4 inch or 6 mm)    Negative: Crooked face or smile    Negative: Looks like a broken bone (e.g., cheekbone is flat on one side)    Negative: Can't fully open or close the mouth    Negative: Neck pain    Negative: Injury caused by high speed (e.g., MVA), great height (e.g., fall > 10 feet or 3 meters), or severe blow from hard object (e.g., golf club or baseball bat)    Negative: Knocked out (unconscious) < 1 minute and now fine    Negative: Closing the mouth and biting down does not feel normal    Negative: Double vision or unable to look upward    Negative: Numbness of the face (i.e., claims loss of sensation in part of face)    Negative: Taking Coumadin (warfarin) or other strong blood thinner, or known bleeding disorder (e.g., thrombocytopenia)    Negative: Sounds like a serious injury to the triager    Negative: Large swelling or bruise (> 2 inches or 5 cm)    Negative: SEVERE pain    Negative: Patient is confused or is an unreliable provider of information (e.g., dementia, severe intellectual disability, alcohol intoxication)    Negative: No prior tetanus shots (or is not fully vaccinated) and any wound (e.g., cut or scrape)    Negative: HIV positive or severe immunodeficiency (severely weak immune system) and DIRTY cut or scrape    Negative: Suspicious history for the injury    Protocols used: FACE INJURY-A-[place]

## 2023-05-26 NOTE — PATIENT INSTRUCTIONS
This should heal well without the aid of suturing today.  Clean it daily with warm soap and water gently.  Apply Vaseline or antibacterial ointment    Once the scab is formed and skin is healed make sure to use sunscreen to decrease risk of scarring

## 2023-05-31 DIAGNOSIS — I10 ESSENTIAL HYPERTENSION: ICD-10-CM

## 2023-06-01 RX ORDER — HYDROCHLOROTHIAZIDE 12.5 MG/1
TABLET ORAL
Qty: 30 TABLET | Refills: 3 | Status: SHIPPED | OUTPATIENT
Start: 2023-06-01 | End: 2023-09-19

## 2023-06-02 ENCOUNTER — TELEPHONE (OUTPATIENT)
Dept: FAMILY MEDICINE | Facility: CLINIC | Age: 43
End: 2023-06-02
Payer: COMMERCIAL

## 2023-06-02 NOTE — TELEPHONE ENCOUNTER
"Due for follow up      Last Written Prescription Date:  5/1/23  Last Fill Quantity: 30,  # refills: 0   Last office visit provider:  5/1/23    Requested Prescriptions   Pending Prescriptions Disp Refills     hydrochlorothiazide (HYDRODIURIL) 12.5 MG tablet [Pharmacy Med Name: hydroCHLOROthiazide Oral Tablet 12.5 MG] 30 tablet 0     Sig: TAKE ONE TABLET BY MOUTH ONE TIME DAILY       Diuretics (Including Combos) Protocol Passed - 5/31/2023  6:13 PM        Passed - Blood pressure under 140/90 in past 12 months     BP Readings from Last 3 Encounters:   05/26/23 139/88   05/23/23 125/86   05/09/23 133/81                 Passed - Recent (12 mo) or future (30 days) visit within the authorizing provider's specialty     Patient has had an office visit with the authorizing provider or a provider within the authorizing providers department within the previous 12 mos or has a future within next 30 days. See \"Patient Info\" tab in inbasket, or \"Choose Columns\" in Meds & Orders section of the refill encounter.              Passed - Medication is active on med list        Passed - Patient is age 18 or older        Passed - Normal serum creatinine on file in past 12 months     Recent Labs   Lab Test 05/01/23  0932   CR 1.01              Passed - Normal serum potassium on file in past 12 months     Recent Labs   Lab Test 05/01/23  0932   POTASSIUM 4.2                    Passed - Normal serum sodium on file in past 12 months     Recent Labs   Lab Test 05/01/23  0932                      Claudine Wright RN 06/01/23 8:12 PM  "

## 2023-06-16 DIAGNOSIS — F90.9 ATTENTION DEFICIT HYPERACTIVITY DISORDER (ADHD), UNSPECIFIED ADHD TYPE: ICD-10-CM

## 2023-06-16 RX ORDER — DEXTROAMPHETAMINE SULFATE, DEXTROAMPHETAMINE SACCHARATE, AMPHETAMINE SULFATE AND AMPHETAMINE ASPARTATE 5; 5; 5; 5 MG/1; MG/1; MG/1; MG/1
CAPSULE, EXTENDED RELEASE ORAL
Qty: 30 CAPSULE | Refills: 0 | Status: SHIPPED | OUTPATIENT
Start: 2023-06-18 | End: 2023-07-19

## 2023-06-30 DIAGNOSIS — I10 ESSENTIAL HYPERTENSION: ICD-10-CM

## 2023-06-30 RX ORDER — AMLODIPINE BESYLATE 5 MG/1
TABLET ORAL
Qty: 90 TABLET | Refills: 3 | Status: SHIPPED | OUTPATIENT
Start: 2023-06-30 | End: 2024-04-16

## 2023-06-30 NOTE — TELEPHONE ENCOUNTER
"  Last Written Prescription Date:  5/1/23  Last Fill Quantity: 30,  # refills: 0   Last office visit provider:  5/1/23     Requested Prescriptions   Pending Prescriptions Disp Refills     amLODIPine (NORVASC) 5 MG tablet [Pharmacy Med Name: amLODIPine Besylate Oral Tablet 5 MG] 30 tablet 0     Sig: TAKE ONE TABLET BY MOUTH ONE TIME DAILY       Calcium Channel Blockers Protocol  Passed - 6/30/2023  1:54 PM        Passed - Blood pressure under 140/90 in past 12 months     BP Readings from Last 3 Encounters:   05/26/23 139/88   05/23/23 125/86   05/09/23 133/81                 Passed - Recent (12 mo) or future (30 days) visit within the authorizing provider's specialty     Patient has had an office visit with the authorizing provider or a provider within the authorizing providers department within the previous 12 mos or has a future within next 30 days. See \"Patient Info\" tab in inbasket, or \"Choose Columns\" in Meds & Orders section of the refill encounter.              Passed - Medication is active on med list        Passed - Patient is age 18 or older        Passed - Normal serum creatinine on file in past 12 months     Recent Labs   Lab Test 05/01/23  0932   CR 1.01       Ok to refill medication if creatinine is low               Valentina Garcia RN 06/30/23 1:54 PM  "

## 2023-07-03 DIAGNOSIS — I10 HYPERTENSION: ICD-10-CM

## 2023-07-03 RX ORDER — LOSARTAN POTASSIUM 100 MG/1
TABLET ORAL
Qty: 90 TABLET | Refills: 3 | Status: SHIPPED | OUTPATIENT
Start: 2023-07-03 | End: 2024-04-16

## 2023-07-03 NOTE — TELEPHONE ENCOUNTER
"Last Written Prescription Date:  8/14/2022  Last Fill Quantity: 90,  # refills: 2   Last office visit provider:  5/1/2023     Requested Prescriptions   Pending Prescriptions Disp Refills     losartan (COZAAR) 100 MG tablet [Pharmacy Med Name: Losartan Potassium Oral Tablet 100 MG] 90 tablet 0     Sig: Take 1 tablet by mouth daily.       Angiotensin-II Receptors Passed - 7/3/2023  2:00 AM        Passed - Last blood pressure under 140/90 in past 12 months     BP Readings from Last 3 Encounters:   05/26/23 139/88   05/23/23 125/86   05/09/23 133/81                 Passed - Recent (12 mo) or future (30 days) visit within the authorizing provider's specialty     Patient has had an office visit with the authorizing provider or a provider within the authorizing providers department within the previous 12 mos or has a future within next 30 days. See \"Patient Info\" tab in inbasket, or \"Choose Columns\" in Meds & Orders section of the refill encounter.              Passed - Medication is active on med list        Passed - Patient is age 18 or older        Passed - Normal serum creatinine on file in past 12 months     Recent Labs   Lab Test 05/01/23  0932   CR 1.01       Ok to refill medication if creatinine is low          Passed - Normal serum potassium on file in past 12 months     Recent Labs   Lab Test 05/01/23  0932   POTASSIUM 4.2                         Tiffani Rowland RN 07/03/23 11:13 AM  "

## 2023-07-19 DIAGNOSIS — F90.9 ATTENTION DEFICIT HYPERACTIVITY DISORDER (ADHD), UNSPECIFIED ADHD TYPE: ICD-10-CM

## 2023-07-19 RX ORDER — DEXTROAMPHETAMINE SULFATE, DEXTROAMPHETAMINE SACCHARATE, AMPHETAMINE SULFATE AND AMPHETAMINE ASPARTATE 5; 5; 5; 5 MG/1; MG/1; MG/1; MG/1
CAPSULE, EXTENDED RELEASE ORAL
Qty: 30 CAPSULE | Refills: 0 | Status: SHIPPED | OUTPATIENT
Start: 2023-07-22 | End: 2023-08-21

## 2023-08-21 DIAGNOSIS — F90.9 ATTENTION DEFICIT HYPERACTIVITY DISORDER (ADHD), UNSPECIFIED ADHD TYPE: ICD-10-CM

## 2023-08-21 RX ORDER — DEXTROAMPHETAMINE SULFATE, DEXTROAMPHETAMINE SACCHARATE, AMPHETAMINE SULFATE AND AMPHETAMINE ASPARTATE 5; 5; 5; 5 MG/1; MG/1; MG/1; MG/1
CAPSULE, EXTENDED RELEASE ORAL
Qty: 30 CAPSULE | Refills: 0 | Status: SHIPPED | OUTPATIENT
Start: 2023-08-22 | End: 2023-09-21

## 2023-09-19 DIAGNOSIS — I10 ESSENTIAL HYPERTENSION: ICD-10-CM

## 2023-09-19 RX ORDER — HYDROCHLOROTHIAZIDE 12.5 MG/1
TABLET ORAL
Qty: 30 TABLET | Refills: 6 | Status: SHIPPED | OUTPATIENT
Start: 2023-09-19 | End: 2024-03-26

## 2023-09-21 DIAGNOSIS — F90.9 ATTENTION DEFICIT HYPERACTIVITY DISORDER (ADHD), UNSPECIFIED ADHD TYPE: ICD-10-CM

## 2023-09-21 RX ORDER — DEXTROAMPHETAMINE SULFATE, DEXTROAMPHETAMINE SACCHARATE, AMPHETAMINE SULFATE AND AMPHETAMINE ASPARTATE 5; 5; 5; 5 MG/1; MG/1; MG/1; MG/1
CAPSULE, EXTENDED RELEASE ORAL
Qty: 30 CAPSULE | Refills: 0 | Status: SHIPPED | OUTPATIENT
Start: 2023-09-21 | End: 2023-10-23

## 2023-10-23 DIAGNOSIS — F90.9 ATTENTION DEFICIT HYPERACTIVITY DISORDER (ADHD), UNSPECIFIED ADHD TYPE: ICD-10-CM

## 2023-10-23 RX ORDER — DEXTROAMPHETAMINE SULFATE, DEXTROAMPHETAMINE SACCHARATE, AMPHETAMINE SULFATE AND AMPHETAMINE ASPARTATE 5; 5; 5; 5 MG/1; MG/1; MG/1; MG/1
CAPSULE, EXTENDED RELEASE ORAL
Qty: 30 CAPSULE | Refills: 0 | Status: SHIPPED | OUTPATIENT
Start: 2023-10-23 | End: 2023-11-22

## 2023-11-21 DIAGNOSIS — F90.9 ATTENTION DEFICIT HYPERACTIVITY DISORDER (ADHD), UNSPECIFIED ADHD TYPE: ICD-10-CM

## 2023-11-22 RX ORDER — DEXTROAMPHETAMINE SULFATE, DEXTROAMPHETAMINE SACCHARATE, AMPHETAMINE SULFATE AND AMPHETAMINE ASPARTATE 5; 5; 5; 5 MG/1; MG/1; MG/1; MG/1
CAPSULE, EXTENDED RELEASE ORAL
Qty: 30 CAPSULE | Refills: 0 | Status: SHIPPED | OUTPATIENT
Start: 2023-11-23 | End: 2023-12-22

## 2023-12-22 DIAGNOSIS — F90.9 ATTENTION DEFICIT HYPERACTIVITY DISORDER (ADHD), UNSPECIFIED ADHD TYPE: ICD-10-CM

## 2023-12-22 RX ORDER — DEXTROAMPHETAMINE SULFATE, DEXTROAMPHETAMINE SACCHARATE, AMPHETAMINE SULFATE AND AMPHETAMINE ASPARTATE 5; 5; 5; 5 MG/1; MG/1; MG/1; MG/1
CAPSULE, EXTENDED RELEASE ORAL
Qty: 30 CAPSULE | Refills: 0 | Status: SHIPPED | OUTPATIENT
Start: 2023-12-24 | End: 2024-01-31

## 2023-12-26 ENCOUNTER — TELEPHONE (OUTPATIENT)
Dept: FAMILY MEDICINE | Facility: CLINIC | Age: 43
End: 2023-12-26
Payer: COMMERCIAL

## 2023-12-26 NOTE — TELEPHONE ENCOUNTER
Positive COVID tests x 2 today.    RN COVID TREATMENT VISIT  12/26/23      The patient has been triaged and does not require a higher level of care.    Michael Cunningham  43 year old  Current weight? 310    Has the patient been seen by a primary care provider at an [facility] or [facility] within the past two years? Yes.   Have you been in close proximity to/do you have a known exposure to a person with a confirmed case of influenza? No.     General treatment eligibility:  Date of positive COVID test (PCR or at home)?  12/26/2023    Are you or have you been hospitalized for this COVID-19 infection? No.   Have you received monoclonal antibodies or antiviral treatment for COVID-19 since this positive test? No.   Do you have any of the following conditions that place you at risk of being very sick from COVID-19?   - Heart conditions such as cardiomyopathies, congenital heart defects, coronary artery disease, heart arrhythmias, heart failure, hypertension, valve disorders   - Overweight or Obesity (BMI >85th percentile or BMI 25 or higher)  Yes, patient has at least one high risk condition as noted above.     Current COVID symptoms:   - fever or chills  - cough  - fatigue  - sore throat  Yes. Patient has at least one symptom as selected.     How many days since symptoms started? 5 days or less. Established patient, 12 years or older weighing at least 88.2 lbs, who has symptoms that started in the past 5 days, has not been hospitalized nor received treatment already, and is at risk for being very sick from COVID-19.     Treatment eligibility by RN:  Are you currently pregnant or nursing? No  Do you have a clinically significant hypersensitivity to nirmatrelvir or ritonavir, or toxic epidermal necrolysis (TEN) or Banda-Daquan Syndrome? No  Do you have a history of hepatitis, any hepatic impairment on the Problem List (such as Child-Rinaldi Class C, cirrhosis, fatty liver disease, alcoholic liver  disease), or was the last liver lab (hepatic panel, ALT, AST, ALK Phos, bilirubin) elevated in the past 6 months? No  Do you have any history of severe renal impairment (eGFR < 30mL/min)? No    Is patient eligible to continue? Yes, patient meets all eligibility requirements for the RN COVID treatment (as denoted by all no responses above).     Current Outpatient Medications   Medication Sig Dispense Refill    amLODIPine (NORVASC) 5 MG tablet TAKE ONE TABLET BY MOUTH ONE TIME DAILY 90 tablet 3    amphetamine-dextroamphetamine (ADDERALL XR) 20 MG 24 hr capsule Take 1 capsule by mouth daily. 30 capsule 0    hydrochlorothiazide (HYDRODIURIL) 12.5 MG tablet TAKE ONE TABLET BY MOUTH ONE TIME DAILY. 30 tablet 6    losartan (COZAAR) 100 MG tablet Take 1 tablet by mouth daily. 90 tablet 3       Medications from List 1 of the standing order (on medications that exclude the use of Paxlovid) that patient is taking: NONE. Is patient taking Lafferty's Wort? No  Is patient taking Lafferty's Wort or any meds from List 1? No.   Medications from List 2 of the standing order (on meds that provider needs to adjust) that patient is taking: amlodipine (Norvasc), explained a provider visit is necessary to discuss medication adjustments while taking Paxlovid. Is patient on any of the meds from List 2? Yes. Patient will be scheduled or transferred to a  at the end of this call.   Corinne Ravenwald, RN        Scheduled for Virtual Covid Visit on 12/27/23 at 0900

## 2023-12-27 ENCOUNTER — VIRTUAL VISIT (OUTPATIENT)
Dept: FAMILY MEDICINE | Facility: CLINIC | Age: 43
End: 2023-12-27
Payer: COMMERCIAL

## 2023-12-27 DIAGNOSIS — U07.1 COVID-19 VIRUS INFECTION: Primary | ICD-10-CM

## 2023-12-27 PROCEDURE — 99213 OFFICE O/P EST LOW 20 MIN: CPT | Mod: VID | Performed by: PHYSICIAN ASSISTANT

## 2023-12-27 NOTE — PATIENT INSTRUCTIONS
Don't take adderall while on paxlovid    Cut amlodipine in half while on paxlovid    The FDA has authorized the emergency use of certain medications for patients who are at high risk for progression to severe illness or death from COVID 19. These medications are investigational, and therefore, information is limited as they are still being studied.        COVID-19 Outpatient Treatments    Important: You can NOT be prescribed Molnupiravir or PAXLOVID if you will start taking the medicine more than 5 days after your symptoms have started.      PAXLOVID: https://www.fda.gov/media/422880/download      Please isolate according to CDC guidelines:  https://www.cdc.gov/coronavirus/2019-ncov/your-health/quarantine-isolation.html      PAXLOVID (nimatrelvir/ritonavir)  How it works  Two medicines (nirmatrelvir and ritonavir) are taken together. They stop the virus from growing. Less amount of virus is easier for your body to fight.  Benefits  Lowers risk of hospitalization and death from COVID-19.  How to take  Medicine comes in a daily container with both medicine tablets. Take by mouth twice daily (once in the morning, once at night) for 5 days.  The number of tablets to take varies by patient.  Don't chew or break capsules. Swallow hole.  When to take  Take as soon as possible after positive COVID-19 test result, and within 5 days of your first symptoms.  Who can take it  Patients must be 18 years or older, weigh at least 88 pounds and have tested positive for COVID-19.  Possible side effects  Can cause altered sense of taste, diarhea (loose, watery stools), high blood pressure, muscle aches.  Medication interactions  Several medicines may interact with PAXLOVID and may cause serious side effects.  Tell your care team about all the medicines you take, including prescription and over-the-counter medicines, vitamins and herbal supplements.  Your provider will review your medicines to make sure that you can safely take  PAXLOVID.  Cautions  PAXLOVID is not recommended for patients with severe kidney or liver disease. If you have mild kidney disease, the dose may need to be changed.  If you are pregnant or breastfeeding, talk to your care team about your options.  If you are sexually active, use effective birth control while taking PAXLOVID.          For informational purposes only. Not to replace the advice of your health care provider. Copyright   2022 [facility]. All rights reserved. Clinically reviewed by Angeles Hagen. [employer] 166002 - 01/22.

## 2023-12-27 NOTE — PROGRESS NOTES
Michael is a 43 year old who is being evaluated via a billable video visit.      How would you like to obtain your AVS? MyChart  If the video visit is dropped, the invitation should be resent by: Text to cell phone: [phone]  Will anyone else be joining your video visit? No        Assessment and Plan:     (U07.1) COVID-19 virus infection  (primary encounter diagnosis)  Comment: Onset 2 days ago, mild symptoms, vaccinated and boosted, high risk due to BMI and hypertension, would like Paxlovid, has tolerated in the past  Plan: nirmatrelvir and ritonavir (PAXLOVID) 300         mg/100 mg therapy pack        Discussed MOA, possible side effects and drug interactions, he would like a prescription  Cut amlodipine in half while on Paxlovid  Don't drink etoh while on paxlovid  To ED if yellowing of eyes or skin, shortness of breath, leg swelling or chest pain  Continue symptomatic cares   Self isolate according to [facility]       Keely Fitzpatrick PA-C      Subjective   Michael is a 43 year old, presenting for the following health issues:  Covid Concern (Patient tested Covid positive on 12/26/2023.  )      HPI       COVID-19 Symptom Review  How many days ago did these symptoms start? 12/25/2023    Are any of the following symptoms significant for you?  New or worsening difficulty breathing? No  Worsening cough? Yes, I am coughing up mucus.  Fever or chills? Yes, the highest temperature was 99.1  Headache: YES  Sore throat: YES  Chest pain: No  Diarrhea: No  Body aches? YES    What treatments has patient tried? Guaifenesin (mucinex), Acetaminophen, and Nonsteroidals   Does patient live in a nursing home, group home, or shelter? No  Does patient have a way to get food/medications during quarantined? Yes, I have a friend or family member who can help me.        Michael is seeing me virtually for covid today  Symptoms started two days ago  He has had congestion, cough, chills, body aches  He denies shortness of breath, chest  pain, leg swelling  He is vaccinated and boosted, hasn't had most recent booster  Would like Rx for paxlovid     Review of Systems   See above      Objective         7 minutes    Vitals:  No vitals were obtained today due to virtual visit.    Physical Exam   GENERAL: Healthy, alert and no distress  EYES: Eyes grossly normal to inspection.  No discharge or erythema, or obvious scleral/conjunctival abnormalities.  RESP: No audible wheeze, cough, or visible cyanosis.  No visible retractions or increased work of breathing.    SKIN: Visible skin clear. No significant rash, abnormal pigmentation or lesions.  NEURO: Cranial nerves grossly intact.  Mentation and speech appropriate for age.  PSYCH: Mentation appears normal, affect normal/bright, judgement and insight intact, normal speech and appearance well-groomed.            Video-Visit Details    Type of service:  Video Visit     Originating Location (pt. Location): Home    Distant Location (provider location):  On-site  Platform used for Video Visit: Char

## 2024-01-30 DIAGNOSIS — F90.9 ATTENTION DEFICIT HYPERACTIVITY DISORDER (ADHD), UNSPECIFIED ADHD TYPE: ICD-10-CM

## 2024-01-31 RX ORDER — DEXTROAMPHETAMINE SULFATE, DEXTROAMPHETAMINE SACCHARATE, AMPHETAMINE SULFATE AND AMPHETAMINE ASPARTATE 5; 5; 5; 5 MG/1; MG/1; MG/1; MG/1
CAPSULE, EXTENDED RELEASE ORAL
Qty: 30 CAPSULE | Refills: 0 | Status: SHIPPED | OUTPATIENT
Start: 2024-01-31

## 2024-01-31 NOTE — TELEPHONE ENCOUNTER
1st attempt sent my chart message to pt to let them know refilled Rx but will need to establish care with another provider prior to add'l refills so contact us to do that or let us know if going elsewhere

## 2024-02-28 ENCOUNTER — LAB (OUTPATIENT)
Dept: LAB | Facility: CLINIC | Age: 44
End: 2024-02-28
Payer: COMMERCIAL

## 2024-02-28 DIAGNOSIS — Z79.899 ENCOUNTER FOR LONG-TERM (CURRENT) USE OF MEDICATIONS: ICD-10-CM

## 2024-02-28 DIAGNOSIS — F90.9 ADHD (ATTENTION DEFICIT HYPERACTIVITY DISORDER): Primary | ICD-10-CM

## 2024-02-28 PROCEDURE — 80307 DRUG TEST PRSMV CHEM ANLYZR: CPT

## 2024-02-29 LAB
AMPHETAMINES UR QL SCN: ABNORMAL
BARBITURATES UR QL SCN: ABNORMAL
BENZODIAZ UR QL SCN: ABNORMAL
BZE UR QL SCN: ABNORMAL
CANNABINOIDS UR QL SCN: ABNORMAL
FENTANYL UR QL: ABNORMAL
OPIATES UR QL SCN: ABNORMAL
PCP QUAL URINE (ROCHE): ABNORMAL

## 2024-03-26 DIAGNOSIS — I10 ESSENTIAL HYPERTENSION: ICD-10-CM

## 2024-03-26 NOTE — TELEPHONE ENCOUNTER
It was prescribed by Dr. Méndez, patient is out of hydrochlorothiazide and needs urgent refill. Patient is scheduled with Jazmine on 4/16.

## 2024-03-27 RX ORDER — HYDROCHLOROTHIAZIDE 12.5 MG/1
12.5 TABLET ORAL DAILY
Qty: 30 TABLET | Refills: 0 | Status: SHIPPED | OUTPATIENT
Start: 2024-03-27 | End: 2024-04-16

## 2024-03-27 NOTE — TELEPHONE ENCOUNTER
Notify patient 30 day supply granted to bridge him until upcoming appointment.    Jazmine Villela PA-C

## 2024-03-27 NOTE — TELEPHONE ENCOUNTER
Patient called in worried, stated he has been out of this for over a week now and needs a prompt refill.  Please advise.

## 2024-04-16 ENCOUNTER — OFFICE VISIT (OUTPATIENT)
Dept: FAMILY MEDICINE | Facility: CLINIC | Age: 44
End: 2024-04-16
Payer: COMMERCIAL

## 2024-04-16 VITALS
OXYGEN SATURATION: 99 % | BODY MASS INDEX: 39.17 KG/M2 | HEART RATE: 81 BPM | DIASTOLIC BLOOD PRESSURE: 98 MMHG | TEMPERATURE: 97.4 F | WEIGHT: 315 LBS | SYSTOLIC BLOOD PRESSURE: 148 MMHG | RESPIRATION RATE: 16 BRPM | HEIGHT: 75 IN

## 2024-04-16 DIAGNOSIS — E78.1 HYPERTRIGLYCERIDEMIA: ICD-10-CM

## 2024-04-16 DIAGNOSIS — R91.1 PULMONARY NODULE: ICD-10-CM

## 2024-04-16 DIAGNOSIS — F32.A DEPRESSION, UNSPECIFIED DEPRESSION TYPE: ICD-10-CM

## 2024-04-16 DIAGNOSIS — G47.33 OSA (OBSTRUCTIVE SLEEP APNEA): ICD-10-CM

## 2024-04-16 DIAGNOSIS — F90.9 ATTENTION DEFICIT HYPERACTIVITY DISORDER (ADHD), UNSPECIFIED ADHD TYPE: ICD-10-CM

## 2024-04-16 DIAGNOSIS — I10 ESSENTIAL HYPERTENSION: ICD-10-CM

## 2024-04-16 DIAGNOSIS — E66.01 MORBID OBESITY (H): ICD-10-CM

## 2024-04-16 DIAGNOSIS — Z00.00 ROUTINE GENERAL MEDICAL EXAMINATION AT A HEALTH CARE FACILITY: Primary | ICD-10-CM

## 2024-04-16 DIAGNOSIS — R73.03 PREDIABETES: ICD-10-CM

## 2024-04-16 LAB
ERYTHROCYTE [DISTWIDTH] IN BLOOD BY AUTOMATED COUNT: 11.9 % (ref 10–15)
HBA1C MFR BLD: 6.3 % (ref 0–5.6)
HCT VFR BLD AUTO: 46.5 % (ref 40–53)
HGB BLD-MCNC: 17 G/DL (ref 13.3–17.7)
MCH RBC QN AUTO: 32.1 PG (ref 26.5–33)
MCHC RBC AUTO-ENTMCNC: 36.6 G/DL (ref 31.5–36.5)
MCV RBC AUTO: 88 FL (ref 78–100)
PLATELET # BLD AUTO: 221 10E3/UL (ref 150–450)
RBC # BLD AUTO: 5.29 10E6/UL (ref 4.4–5.9)
WBC # BLD AUTO: 5.2 10E3/UL (ref 4–11)

## 2024-04-16 PROCEDURE — 85027 COMPLETE CBC AUTOMATED: CPT | Performed by: PHYSICIAN ASSISTANT

## 2024-04-16 PROCEDURE — 80053 COMPREHEN METABOLIC PANEL: CPT | Performed by: PHYSICIAN ASSISTANT

## 2024-04-16 PROCEDURE — 99396 PREV VISIT EST AGE 40-64: CPT | Performed by: PHYSICIAN ASSISTANT

## 2024-04-16 PROCEDURE — 83036 HEMOGLOBIN GLYCOSYLATED A1C: CPT | Performed by: PHYSICIAN ASSISTANT

## 2024-04-16 PROCEDURE — 80061 LIPID PANEL: CPT | Performed by: PHYSICIAN ASSISTANT

## 2024-04-16 PROCEDURE — 84443 ASSAY THYROID STIM HORMONE: CPT | Performed by: PHYSICIAN ASSISTANT

## 2024-04-16 PROCEDURE — 99214 OFFICE O/P EST MOD 30 MIN: CPT | Mod: 25 | Performed by: PHYSICIAN ASSISTANT

## 2024-04-16 PROCEDURE — 36415 COLL VENOUS BLD VENIPUNCTURE: CPT | Performed by: PHYSICIAN ASSISTANT

## 2024-04-16 RX ORDER — AMLODIPINE BESYLATE 5 MG/1
5 TABLET ORAL DAILY
Qty: 90 TABLET | Refills: 3 | Status: SHIPPED | OUTPATIENT
Start: 2024-04-16

## 2024-04-16 RX ORDER — BUPROPION HYDROCHLORIDE 150 MG/1
150 TABLET ORAL EVERY MORNING
COMMUNITY
Start: 2024-02-15 | End: 2024-08-09

## 2024-04-16 RX ORDER — HYDROCHLOROTHIAZIDE 12.5 MG/1
12.5 TABLET ORAL DAILY
Qty: 90 TABLET | Refills: 3 | Status: SHIPPED | OUTPATIENT
Start: 2024-04-16 | End: 2024-08-09

## 2024-04-16 RX ORDER — BUPROPION HYDROCHLORIDE 300 MG/1
300 TABLET ORAL EVERY MORNING
COMMUNITY
Start: 2024-01-30 | End: 2024-08-09

## 2024-04-16 RX ORDER — LOSARTAN POTASSIUM 100 MG/1
100 TABLET ORAL DAILY
Qty: 90 TABLET | Refills: 3 | Status: SHIPPED | OUTPATIENT
Start: 2024-04-16

## 2024-04-16 SDOH — HEALTH STABILITY: PHYSICAL HEALTH: ON AVERAGE, HOW MANY DAYS PER WEEK DO YOU ENGAGE IN MODERATE TO STRENUOUS EXERCISE (LIKE A BRISK WALK)?: 2 DAYS

## 2024-04-16 ASSESSMENT — SOCIAL DETERMINANTS OF HEALTH (SDOH): HOW OFTEN DO YOU GET TOGETHER WITH FRIENDS OR RELATIVES?: ONCE A WEEK

## 2024-04-16 NOTE — PATIENT INSTRUCTIONS
Preventive Care Advice   This is general advice given by our system to help you stay healthy. However, your care team may have specific advice just for you. Please talk to your care team about your preventive care needs.  Nutrition  Eat 5 or more servings of fruits and vegetables each day.  Try wheat bread, brown rice and whole grain pasta (instead of white bread, rice, and pasta).  Get enough calcium and vitamin D. Check the label on foods and aim for 100% of the RDA (recommended daily allowance).  Lifestyle  Exercise at least 150 minutes each week   (30 minutes a day, 5 days a week).  Do muscle strengthening activities 2 days a week. These help control your weight and prevent disease.  No smoking.  Wear sunscreen to prevent skin cancer.  Have a dental exam and cleaning every 6 months.  Yearly exams  See your health care team every year to talk about:  Any changes in your health.  Any medicines your care team has prescribed.  Preventive care, family planning, and ways to prevent chronic diseases.  Shots (vaccines)   HPV shots (up to age 26), if you've never had them before.  Hepatitis B shots (up to age 59), if you've never had them before.  COVID-19 shot: Get this shot when it's due.  Flu shot: Get a flu shot every year.  Tetanus shot: Get a tetanus shot every 10 years.  Pneumococcal, hepatitis A, and RSV shots: Ask your care team if you need these based on your risk.  Shingles shot (for age 50 and up).  General health tests  Diabetes screening:  Starting at age 35, Get screened for diabetes at least every 3 years.  If you are younger than age 35, ask your care team if you should be screened for diabetes.  Cholesterol test: At age 39, start having a cholesterol test every 5 years, or more often if advised.  Bone density scan (DEXA): At age 50, ask your care team if you should have this scan for osteoporosis (brittle bones).  Hepatitis C: Get tested at least once in your life.  STIs (sexually transmitted  infections)  Before age 24: Ask your care team if you should be screened for STIs.  After age 24: Get screened for STIs if you're at risk. You are at risk for STIs (including HIV) if:  You are sexually active with more than one person.  You don't use condoms every time.  You or a partner was diagnosed with a sexually transmitted infection.  If you are at risk for HIV, ask about PrEP medicine to prevent HIV.  Get tested for HIV at least once in your life, whether you are at risk for HIV or not.  Cancer screening tests  Cervical cancer screening: If you have a cervix, begin getting regular cervical cancer screening tests at age 21. Most people who have regular screenings with normal results can stop after age 65. Talk about this with your provider.  Breast cancer scan (mammogram): If you've ever had breasts, begin having regular mammograms starting at age 40. This is a scan to check for breast cancer.  Colon cancer screening: It is important to start screening for colon cancer at age 45.  Have a colonoscopy test every 10 years (or more often if you're at risk) Or, ask your provider about stool tests like a FIT test every year or Cologuard test every 3 years.  To learn more about your testing options, visit: https://[email or web address]/448085.pdf.  For help making a decision, visit: https://bit.ly/lc66053.  Prostate cancer screening test: If you have a prostate and are age 55 to 69, ask your provider if you would benefit from a yearly prostate cancer screening test.  Lung cancer screening: If you are a current or former smoker age 50 to 80, ask your care team if ongoing lung cancer screenings are right for you.  For informational purposes only. Not to replace the advice of your health care provider. Copyright   2023 [employer]. All rights reserved. Clinically reviewed by the [facility] Transitions Program. [employer] [id number] - REV 01/24.

## 2024-04-16 NOTE — PROGRESS NOTES
"Preventive Care Visit  [facility]  Jazmine Villela PA-C, Physician Assistant - Medical  Apr 16, 2024      Assessment & Plan     Routine general medical examination at a health care facility  Labs updated today.  Vaccines- declines flu and COVID.  - CBC with platelets  - Comprehensive metabolic panel  - Hemoglobin A1c  - Lipid panel reflex to direct LDL Fasting  - TSH with free T4 reflex    Morbid obesity (H)  Chronic issue, BMI at 40, work on lifestyle changes to improve weight.    LINDA (obstructive sleep apnea)  Chronic issue, uses CPAP nightly.    Pulmonary nodule  Patient has history of pulmonary nodules on CT in 2016, saw pulmonary medicine. No further work up needed.    Hypertriglyceridemia  Chronic issue, recheck Lipid Panel today.    Prediabetes  Patient has history of A1C elevation, recommend recheck today.    Attention deficit hyperactivity disorder (ADHD), unspecified ADHD type  Chronic issue on Adderall prescribed by psychiatry.    Essential hypertension  Chronic issue, BP mildly elevated today but is off Losartan.  Meds refilled today, plan nurse BP check in 2 weeks.  - hydroCHLOROthiazide 12.5 MG tablet  Dispense: 90 tablet; Refill: 3  - amLODIPine (NORVASC) 5 MG tablet  Dispense: 90 tablet; Refill: 3  - losartan (COZAAR) 100 MG tablet  Dispense: 90 tablet; Refill: 3    Depression, unspecified depression type  Chronic issue on Wellbutrin, managed by psychiatry.      Patient has been advised of split billing requirements and indicates understanding: Yes        BMI  Estimated body mass index is 40.56 kg/m  as calculated from the following:    Height as of this encounter: 1.892 m (6' 2.5\").    Weight as of this encounter: 145.2 kg (320 lb 3.2 oz).   Weight management plan: Discussed healthy diet and exercise guidelines    Counseling  Appropriate preventive services were discussed with this patient, including applicable screening as appropriate for fall prevention, " nutrition, physical activity, Tobacco-use cessation, weight loss and cognition.  Checklist reviewing preventive services available has been given to the patient.  Reviewed patient's diet, addressing concerns and/or questions.   He is at risk for lack of exercise and has been provided with information to increase physical activity for the benefit of his well-being.       Risks, benefits and alternatives were discussed with patient. Agreeable to the plan of care.      Shandra Rodriguez is a 43 year old, presenting for the following:  Physical        4/16/2024     9:22 AM   Additional Questions   Roomed by OSCAR Wynn CMA([facility])        Health Care Directive  Patient does not have a Health Care Directive or Living Will: Discussed advance care planning with patient; however, patient declined at this time.    HPI            4/16/2024   General Health   How would you rate your overall physical health? Good   Feel stress (tense, anxious, or unable to sleep) To some extent   (!) STRESS CONCERN      4/16/2024   Nutrition   Three or more servings of calcium each day? (!) NO   Diet: Regular (no restrictions)   How many servings of fruit and vegetables per day? (!) 2-3   How many sweetened beverages each day? 0-1         4/16/2024   Exercise   Days per week of moderate/strenous exercise 2 days   (!) EXERCISE CONCERN      4/16/2024   Social Factors   Frequency of gathering with friends or relatives Once a week   Worry food won't last until get money to buy more No   Food not last or not have enough money for food? No   Do you have housing?  Yes   Are you worried about losing your housing? No   Lack of transportation? No   Unable to get utilities (heat,electricity)? No         4/16/2024   Dental   Dentist two times every year? Yes         4/16/2024   TB Screening   Were you born outside of the [place]? No         Today's PHQ-2 Score:       4/16/2024     9:14 AM   PHQ-2 ( 1999 [employer])   Q1: Little interest or pleasure in doing things 1    Q2: Feeling down, depressed or hopeless 1   PHQ-2 Score 2   Q1: Little interest or pleasure in doing things Several days   Q2: Feeling down, depressed or hopeless Several days   PHQ-2 Score 2           4/16/2024   Substance Use   Alcohol more than 3/day or more than 7/wk No   Do you use any other substances recreationally? No     Social History     Tobacco Use    Smoking status: Former     Passive exposure: Never    Smokeless tobacco: Current     Types: Chew   Substance Use Topics    Alcohol use: Not Currently    Drug use: Never           4/16/2024   STI Screening   New sexual partner(s) since last STI/HIV test? No   ASCVD Risk   The 10-year ASCVD risk score (Evaristo MONROE, et al., 2019) is: 2.1%    Values used to calculate the score:      Age: 43 years      Sex: Male      Is Non- : No      Diabetic: No      Tobacco smoker: No      Systolic Blood Pressure: 142 mmHg      Is BP treated: Yes      HDL Cholesterol: 31 mg/dL      Total Cholesterol: 155 mg/dL        4/16/2024   Contraception/Family Planning   Questions about contraception or family planning No        Reviewed and updated as needed this visit by Provider                    Patient Active Problem List   Diagnosis    ADHD (attention deficit hyperactivity disorder)    Hypertension    Hypertriglyceridemia    LINDA (obstructive sleep apnea)    Prediabetes    Pulmonary nodule    Morbid obesity (H)    Depression, unspecified depression type     Past Surgical History:   Procedure Laterality Date    OTHER SURGICAL HISTORY      nose        Social History     Tobacco Use    Smoking status: Former     Types: Cigarettes     Passive exposure: Never    Smokeless tobacco: Current     Types: Chew   Substance Use Topics    Alcohol use: Not Currently     Family History   Problem Relation Age of Onset    Snoring Mother     No Known Problems Mother     Heart Disease Father     No Known Problems Sister     Cancer Maternal Grandfather         smoker,  "lung cancer    Cancer Paternal Grandmother         Brain , 58 years     Cancer Paternal Grandfather         lung, cancer         Current Outpatient Medications   Medication Sig Dispense Refill    ADDERALL XR 20 MG 24 hr capsule Take 1 capsule by mouth daily. 30 capsule 0    amLODIPine (NORVASC) 5 MG tablet Take 1 tablet (5 mg) by mouth daily 90 tablet 3    buPROPion (WELLBUTRIN XL) 150 MG 24 hr tablet Take 150 mg by mouth every morning Take with 300 mg for total 450mg      buPROPion (WELLBUTRIN XL) 300 MG 24 hr tablet Take 300 mg by mouth every morning Take with 150 mg for a total of 450mg      hydroCHLOROthiazide 12.5 MG tablet Take 1 tablet (12.5 mg) by mouth daily 90 tablet 3    losartan (COZAAR) 100 MG tablet Take 1 tablet (100 mg) by mouth daily 90 tablet 3     Allergies   Allergen Reactions    Sulfa Antibiotics Anaphylaxis         Review of Systems  CONSTITUTIONAL: NEGATIVE for fever, chills, change in weight  INTEGUMENTARY/SKIN: NEGATIVE for worrisome rashes, moles or lesions  EYES: NEGATIVE for vision changes or irritation  ENT/MOUTH: NEGATIVE for ear, mouth and throat problems  RESP: NEGATIVE for significant cough or SOB  CV: NEGATIVE for chest pain, palpitations or peripheral edema  GI: NEGATIVE for nausea, abdominal pain, heartburn, or change in bowel habits  : NEGATIVE for frequency, dysuria, or hematuria  MUSCULOSKELETAL: NEGATIVE for significant arthralgias or myalgia  NEURO: NEGATIVE for weakness, dizziness or paresthesias  ENDOCRINE: NEGATIVE for temperature intolerance, skin/hair changes  HEME: NEGATIVE for bleeding problems  PSYCHIATRIC: NEGATIVE for changes in mood or affect     Objective    Exam  BP (!) 142/88   Pulse 74   Temp 97.4  F (36.3  C) (Oral)   Resp 16   Ht 1.892 m (6' 2.5\")   Wt 145.2 kg (320 lb 3.2 oz)   SpO2 99%   BMI 40.56 kg/m     Estimated body mass index is 40.56 kg/m  as calculated from the following:    Height as of this encounter: 1.892 m (6' 2.5\").    Weight as of " this encounter: 145.2 kg (320 lb 3.2 oz).    Physical Exam  GENERAL: alert and no distress  EYES: Eyes grossly normal to inspection, PERRL and conjunctivae and sclerae normal  HENT: ear canals and TM's normal, nose and mouth without ulcers or lesions  NECK: no adenopathy, no asymmetry, masses, or scars  RESP: lungs clear to auscultation - no rales, rhonchi or wheezes  CV: regular rate and rhythm, normal S1 S2, no S3 or S4, no murmur, click or rub, no peripheral edema  ABDOMEN: soft, nontender, no hepatosplenomegaly, no masses and bowel sounds normal  MS: no gross musculoskeletal defects noted, no edema  SKIN: no suspicious lesions or rashes  NEURO: Normal strength and tone, mentation intact and speech normal  PSYCH: mentation appears normal, affect normal/bright        Signed Electronically by: Jazmine Villela PA-C

## 2024-04-17 LAB
ALBUMIN SERPL BCG-MCNC: 4.5 G/DL (ref 3.5–5.2)
ALP SERPL-CCNC: 64 U/L (ref 40–150)
ALT SERPL W P-5'-P-CCNC: 52 U/L (ref 0–70)
ANION GAP SERPL CALCULATED.3IONS-SCNC: 7 MMOL/L (ref 7–15)
AST SERPL W P-5'-P-CCNC: 31 U/L (ref 0–45)
BILIRUB SERPL-MCNC: 0.7 MG/DL
BUN SERPL-MCNC: 16.4 MG/DL (ref 6–20)
CALCIUM SERPL-MCNC: 9.6 MG/DL (ref 8.6–10)
CHLORIDE SERPL-SCNC: 101 MMOL/L (ref 98–107)
CHOLEST SERPL-MCNC: 130 MG/DL
CREAT SERPL-MCNC: 1.11 MG/DL (ref 0.67–1.17)
DEPRECATED HCO3 PLAS-SCNC: 32 MMOL/L (ref 22–29)
EGFRCR SERPLBLD CKD-EPI 2021: 86 ML/MIN/1.73M2
FASTING STATUS PATIENT QL REPORTED: YES
GLUCOSE SERPL-MCNC: 135 MG/DL (ref 70–99)
HDLC SERPL-MCNC: 28 MG/DL
LDLC SERPL CALC-MCNC: 76 MG/DL
NONHDLC SERPL-MCNC: 102 MG/DL
POTASSIUM SERPL-SCNC: 4.2 MMOL/L (ref 3.4–5.3)
PROT SERPL-MCNC: 7 G/DL (ref 6.4–8.3)
SODIUM SERPL-SCNC: 140 MMOL/L (ref 135–145)
TRIGL SERPL-MCNC: 128 MG/DL
TSH SERPL DL<=0.005 MIU/L-ACNC: 1.33 UIU/ML (ref 0.3–4.2)

## 2024-04-30 ENCOUNTER — ALLIED HEALTH/NURSE VISIT (OUTPATIENT)
Dept: FAMILY MEDICINE | Facility: CLINIC | Age: 44
End: 2024-04-30
Payer: COMMERCIAL

## 2024-04-30 VITALS — DIASTOLIC BLOOD PRESSURE: 91 MMHG | SYSTOLIC BLOOD PRESSURE: 145 MMHG | HEART RATE: 77 BPM

## 2024-04-30 DIAGNOSIS — I10 ESSENTIAL HYPERTENSION: Primary | ICD-10-CM

## 2024-04-30 PROCEDURE — 99207 PR NO CHARGE NURSE ONLY: CPT

## 2024-04-30 NOTE — PROGRESS NOTES
I met with Michael Cunningham at the request of Jazmine Villela PA-C   to recheck his blood pressure.  Blood pressure medications on the med list were reviewed with patient.    Patient has taken all medications as per usual regimen: Yes  Patient reports tolerating them without any issues or concerns: Yes    Vitals:    04/30/24 0910 04/30/24 0919   BP: (!) 154/92 (!) 145/91   BP Location: Right arm Right arm   Patient Position: Sitting Sitting   Cuff Size: Adult Regular Adult Regular   Pulse: 77 77       After 5 minutes, the patient's blood pressure remained greater than or equal to 140/90.    Is the patient currently having any chest pain? No  Does the patient currently have a headache? No  Does the patient currently have any vision changes? No  Does the patient currently have any nausea? No  Does the patient currently have any abdominal pain? No    The previous encounter was reviewed.  The patient was discharged and the note will be sent to the provider for final review.

## 2024-05-01 ENCOUNTER — TELEPHONE (OUTPATIENT)
Dept: FAMILY MEDICINE | Facility: CLINIC | Age: 44
End: 2024-05-01
Payer: COMMERCIAL

## 2024-05-01 NOTE — TELEPHONE ENCOUNTER
Left message to call back for: Bp check  Information to relay to patient: LMTCB, please see message below.        Jazmine Villela PA-C at 4/30/2024  9:30 AM    Status: Signed   Patient needs BP recheck with provider for medication adjustment.     Jazmine Villela PA-C

## 2024-08-09 ENCOUNTER — TELEPHONE (OUTPATIENT)
Dept: FAMILY MEDICINE | Facility: CLINIC | Age: 44
End: 2024-08-09

## 2024-08-09 ENCOUNTER — OFFICE VISIT (OUTPATIENT)
Dept: FAMILY MEDICINE | Facility: CLINIC | Age: 44
End: 2024-08-09
Payer: COMMERCIAL

## 2024-08-09 VITALS
RESPIRATION RATE: 20 BRPM | BODY MASS INDEX: 39.17 KG/M2 | HEIGHT: 75 IN | TEMPERATURE: 98.6 F | OXYGEN SATURATION: 97 % | SYSTOLIC BLOOD PRESSURE: 130 MMHG | HEART RATE: 76 BPM | DIASTOLIC BLOOD PRESSURE: 82 MMHG | WEIGHT: 315 LBS

## 2024-08-09 DIAGNOSIS — F90.9 ATTENTION DEFICIT HYPERACTIVITY DISORDER (ADHD), UNSPECIFIED ADHD TYPE: ICD-10-CM

## 2024-08-09 DIAGNOSIS — E66.01 MORBID OBESITY (H): ICD-10-CM

## 2024-08-09 DIAGNOSIS — F32.A DEPRESSION, UNSPECIFIED DEPRESSION TYPE: ICD-10-CM

## 2024-08-09 DIAGNOSIS — I10 ESSENTIAL HYPERTENSION: Primary | ICD-10-CM

## 2024-08-09 DIAGNOSIS — R60.9 EDEMA, UNSPECIFIED TYPE: ICD-10-CM

## 2024-08-09 PROCEDURE — 99214 OFFICE O/P EST MOD 30 MIN: CPT | Performed by: PHYSICIAN ASSISTANT

## 2024-08-09 PROCEDURE — G2211 COMPLEX E/M VISIT ADD ON: HCPCS | Performed by: PHYSICIAN ASSISTANT

## 2024-08-09 RX ORDER — FUROSEMIDE 20 MG
20 TABLET ORAL DAILY
Qty: 90 TABLET | Refills: 1 | Status: SHIPPED | OUTPATIENT
Start: 2024-08-09

## 2024-08-09 RX ORDER — FUROSEMIDE 20 MG
20 TABLET ORAL DAILY
Qty: 90 TABLET | Refills: 1 | Status: SHIPPED | OUTPATIENT
Start: 2024-08-09 | End: 2024-08-09

## 2024-08-09 RX ORDER — ARIPIPRAZOLE 2 MG/1
1 TABLET ORAL
COMMUNITY
Start: 2024-07-25

## 2024-08-09 NOTE — TELEPHONE ENCOUNTER
Medication Question or Refill    Contacts       Contact Date/Time Type Contact Phone/Fax    08/09/2024 09:19 AM CDT Phone (Incoming) [id number] Beronica the pharmacist - [place]  [address] [place] [address] (Pharmacy) [phone]            What medication are you calling about (include dose and sig)?:   furosemide (LASIX) 20 MG tablet  20 mg, DAILY                           Preferred Pharmacy:   [id number] - [place] - [address] [place] [address]  [address] [place] [address]  Phone: [phone] Fax: [phone]      Controlled Substance Agreement on file:   CSA -- Patient Level:     [Media Unavailable] Controlled Substance Agreement - Non - Opioid - Scan on 10/12/2021  2:47 PM: NON-OPIOID CONTROLLED SUBSTANCE AGREEMENT   [Media Unavailable] Controlled Substance Agreement - Non - Opioid - Scan on 9/28/2020: NON-OPIOID CONTROLLED SUBSTANCE AGREEMENT   [Media Unavailable] Controlled Substance Agreement - Non - Opioid - Scan on 9/18/2019   [Media Unavailable] Controlled Substance Agreement - Opioid - Scan on 10/17/2018       Who prescribed the medication?: SHAKA BALDERRAMA        Patient offered an appointment? No- Appointment today 8/9/24    Do you have any questions or concerns?  Yes: Furosemide is contraindicated the DX of Oliguria. Please advise if patient should be on this medication.  Beronica the Pharmacist at [facility] will hold until SHAKA Gill's responds      Could we send this information to you in [facility] or would you prefer to receive a phone call?:   Patient would prefer a phone call   Okay to leave a detailed message?: Yes at Other phone number:  Beronica the pharmacist at [facility] [phone]

## 2024-08-09 NOTE — TELEPHONE ENCOUNTER
This was associated with this diagnosis in error.  This has been updated on our end.  If she needs a new prescription sent, sent.    Should just be with edema.    Ehsan Gill PA-C

## 2024-08-09 NOTE — PROGRESS NOTES
Assessment & Plan     Essential hypertension  Controlled on current regimen.  However is not happy with the hydrochlorothiazide in the sense of water retention management.  Discussed alternative options such as Lasix which is meant more for edema management rather than blood pressure.  Patient would like to switch to this if possible.  Will do so but warned this may not aid in blood pressure control as much is hydrochlorothiazide.  Will recheck a BMP in approximately 2 weeks as well as a nurse only visit.  If the blood pressure increases, we can increase amlodipine to 10 mg if necessary.  However, we will need to watch for worsening edema if amlodipine is increased.  Patient is in agreement with this plan.    Edema, unspecified type  As above  - furosemide (LASIX) 20 MG tablet; Take 1 tablet (20 mg) by mouth daily  Medication use and side effects discussed with the patient. Patient is in complete understanding and agreement with plan.     Attention deficit hyperactivity disorder (ADHD), unspecified ADHD type  Followed by psych    Depression, unspecified depression type  Followed by psych    Morbid obesity (H)  Referral given.  - Adult Comprehensive Weight Management  Referral; Future                Subjective   Michael is a 44 year old, presenting for the following health issues:  Blood Pressure Check and Referral (Weight loss )        8/9/2024     7:48 AM   Additional Questions   Roomed by Jessie BRADFORD LPN     History of Present Illness       Hypertension: He presents for follow up of hypertension.  He does not check blood pressure  regularly outside of the clinic. Outpatient blood pressures have not been over 140/90. He does not follow a low salt diet.     He eats 0-1 servings of fruits and vegetables daily.He consumes 0 sweetened beverage(s) daily.He exercises with enough effort to increase his heart rate 9 or less minutes per day.  He exercises with enough effort to increase his heart rate 4 days per week.  "  He is taking medications regularly.     Patient presents today for follow-up of hypertension.  Was seen approximately 4 months ago and medications were started.  Patient continues these without side effects.  No ever, he was recently started on Abilify and has noticed troubles with retention of water and decreased urine output since starting.  He states he has been on water pills in the past that has helped with the symptoms that he is had in the past.  The hydrochlorothiazide he is currently taken does not seem to be helping.    Patient also has a past history of morbid obesity that he has struggled to maintain through lifestyle changes.  He is interested in possible bariatric surgery and would like a referral.  His wife is undergoing bariatric surgery evaluation as well.            Objective    /82   Pulse 76   Temp 98.6  F (37  C) (Oral)   Resp 20   Ht 1.892 m (6' 2.5\")   Wt 148.9 kg (328 lb 3.2 oz)   SpO2 97%   BMI 41.57 kg/m    Body mass index is 41.57 kg/m .  Physical Exam   GENERAL: alert, no distress, and obese  RESP: lungs clear to auscultation - no rales, rhonchi or wheezes  CV: regular rates and rhythm, normal S1 S2, no S3 or S4, and no murmur, click or rub  PSYCH: mentation appears normal, affect normal/bright  \        Signed Electronically by: Paresh Gill PA-C    "

## 2024-10-04 ENCOUNTER — ALLIED HEALTH/NURSE VISIT (OUTPATIENT)
Dept: FAMILY MEDICINE | Facility: CLINIC | Age: 44
End: 2024-10-04
Payer: COMMERCIAL

## 2024-10-04 ENCOUNTER — LAB (OUTPATIENT)
Dept: LAB | Facility: CLINIC | Age: 44
End: 2024-10-04
Payer: COMMERCIAL

## 2024-10-04 VITALS — HEART RATE: 78 BPM | SYSTOLIC BLOOD PRESSURE: 148 MMHG | DIASTOLIC BLOOD PRESSURE: 85 MMHG

## 2024-10-04 DIAGNOSIS — I10 ESSENTIAL HYPERTENSION: Primary | ICD-10-CM

## 2024-10-04 LAB
ANION GAP SERPL CALCULATED.3IONS-SCNC: 12 MMOL/L (ref 7–15)
BUN SERPL-MCNC: 16.3 MG/DL (ref 6–20)
CALCIUM SERPL-MCNC: 9.2 MG/DL (ref 8.8–10.4)
CHLORIDE SERPL-SCNC: 102 MMOL/L (ref 98–107)
CREAT SERPL-MCNC: 1.07 MG/DL (ref 0.67–1.17)
EGFRCR SERPLBLD CKD-EPI 2021: 89 ML/MIN/1.73M2
GLUCOSE SERPL-MCNC: 198 MG/DL (ref 70–99)
HCO3 SERPL-SCNC: 25 MMOL/L (ref 22–29)
POTASSIUM SERPL-SCNC: 3.9 MMOL/L (ref 3.4–5.3)
SODIUM SERPL-SCNC: 139 MMOL/L (ref 135–145)

## 2024-10-04 PROCEDURE — 80048 BASIC METABOLIC PNL TOTAL CA: CPT

## 2024-10-04 PROCEDURE — 99207 PR NO CHARGE NURSE ONLY: CPT

## 2024-10-04 PROCEDURE — 36415 COLL VENOUS BLD VENIPUNCTURE: CPT

## 2024-10-04 NOTE — PROGRESS NOTES
Follow Up Blood Pressure Check    Michael Cunningham is a 44 year old male recommended to follow up for blood pressure check by Paresh Gill. Anihypertensive medications and adherence were verified: Yes.     Reason for visit:     Medication change at last visit:     Essential hypertension  Controlled on current regimen.  However is not happy with the hydrochlorothiazide in the sense of water retention management.  Discussed alternative options such as Lasix which is meant more for edema management rather than blood pressure.  Patient would like to switch to this if possible.  Will do so but warned this may not aid in blood pressure control as much is hydrochlorothiazide.  Will recheck a BMP in approximately 2 weeks as well as a nurse only visit.  If the blood pressure increases, we can increase amlodipine to 10 mg if necessary.  However, we will need to watch for worsening edema if amlodipine is increased.  Patient is in agreement with this plan.  Today's Vitals:   Vitals:    10/04/24 0902 10/04/24 0912   BP: (!) 150/86 (!) 148/85   BP Location: Left arm Right arm   Patient Position: Sitting Sitting   Cuff Size: Adult Large Adult Large   Pulse: 83 78           Lowest blood pressure today is  148/85  and they deny signs or symptoms of new onset: severe headache, fatigue, confusion, vision changes, chest pain, pounding in the chest, neck, ears, irregular heartbeat, difficulty breathing, and blood in the urine.  Please inform patient of his/her blood pressure today.  If they are asymptomatic, the patient is to continue current medications.  This message will be routed to their provider, and they will be notified if a change in medication is recommended.        Jeannette Wang MA    Current Outpatient Medications   Medication Sig Dispense Refill    ADDERALL XR 20 MG 24 hr capsule Take 1 capsule by mouth daily. 30 capsule 0    amLODIPine (NORVASC) 5 MG tablet Take 1 tablet (5 mg) by mouth daily 90 tablet 3     ARIPiprazole (ABILIFY) 2 MG tablet Take 1 tablet by mouth daily at 2 pm      furosemide (LASIX) 20 MG tablet Take 1 tablet (20 mg) by mouth daily 90 tablet 1    losartan (COZAAR) 100 MG tablet Take 1 tablet (100 mg) by mouth daily 90 tablet 3    metFORMIN (GLUCOPHAGE) 500 MG tablet TAKE 2 TABLETS BY MOUTH ONCE DAILY AFTER A MEAL*

## 2024-10-07 DIAGNOSIS — R73.9 ELEVATED BLOOD SUGAR: Primary | ICD-10-CM

## 2024-10-10 ENCOUNTER — TELEPHONE (OUTPATIENT)
Dept: SURGERY | Facility: CLINIC | Age: 44
End: 2024-10-10
Payer: COMMERCIAL

## 2024-10-10 NOTE — PROGRESS NOTES
"60 minutes spent by me on the date of the encounter doing chart review, history and exam, documentation and further activities per the note    New Bariatric Surgery Consultation Note    October 10, 2024    RE: Michael Cunningham  MR#: [id number]  : 1980      Referring provider:       10/10/2024     3:47 PM   --   Who referred you My regular doctor       Chief Complaint/Reason for visit: evaluation for possible weight loss surgery    Dear Paresh Gill PA-C (General),    I had the pleasure of seeing your patient, Michael Cunningham, to evaluate his obesity and consider him for possible weight loss surgery. As you know, Michael Cunningham is 44 year old.  He has a height of 6' 2.547\", a weight of 325 lbs 3.2 oz, and calculated Body mass index is 41.14 kg/m ..  Today we discussed our Bariatric Surgery program to address their weight related health. He has has viewed our online seminar prior to this visit and on discussion today, he has decided to pursue the surgical program.  Given his current nicotine pouch use, we'll ramp up medication support with Zepbound given his BMI of 41, LINDA, HTN and borderline diabetes that escalated after weight gain on Abilify last year (now off). I'll check in with him in 3-4 months to see if nicotine abstinence has succeeded and if resistant to medication support, we'll continue to surgery.     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  , no kids.   Good compliance with CPAP. \"Can't sleep without it\". Given smaller airway, I anticipate incomplete resolution of LINDA but improved severity with weight loss.  Ability to avoid nicotine. Non drinker due to strong family hx of alcoholism.  Higher muscle mass/height.       Assessment & Plan   Problem List Items Addressed This Visit    None     Plan:  Welcome to the surgical program. Given some ongoing nicotine use (pouches) we'll start medication support to " help with recent borderline diabetes/complement your metformin use and provide some resistance to weight gain after quitting. Start Zepbound if covered, at 2.5mg/week for 4 weeks then increase and hold at 5mg/week.  Stop if severe abdominal pain/vomiting/rash/throat swelling (see handout below).       2. Check labs today.    3. Schedule dietician visits and start psychology visits in December to give you at least a month of being off nicotine.     4. Anticipate actigall use after surgery to reduce gallstones.    5. Attend support group at least once.    6. Check medication tolerance/nicotine cessation with me in 3-4 months.     7. Continue active lifestyle.     8. Bring CPAP to surgery. Continue good use.      HISTORY OF PRESENT ILLNESS:      10/10/2024     3:47 PM   Weight Loss History Reviewed with Patient   How long have you been overweight? Since early childhood   What is the most that you have ever weighed 326   What is the most weight you have lost? 30   I have tried the following methods to lose weight Watching portions or calories    Exercise    Weight Watchers   I have tried the following weight loss medications? (Check all that apply) None       CO-MORBIDITIES OF OBESITY INCLUDE:      10/10/2024     3:47 PM   --   I have the following health issues associated with obesity Pre-Diabetes    High Blood Pressure    Sleep Apnea   Close to diabetic levels blood sugar on 10/4/24 after starting Abilify induced weight gain.  Last Comprehensive Metabolic Panel:  Lab Results   Component Value Date     10/04/2024    POTASSIUM 3.9 10/04/2024    CHLORIDE 102 10/04/2024    CO2 25 10/04/2024    ANIONGAP 12 10/04/2024     (H) 10/04/2024    BUN 16.3 10/04/2024    CR 1.07 10/04/2024    GFRESTIMATED 89 10/04/2024    EYAD 9.2 10/04/2024       A1c borderline as well (see below).     PAST MEDICAL HISTORY:  Past Medical History:   Diagnosis Date    Anxiety 2021    Depressive disorder 2021    Hypertension 2010    LINDA  (obstructive sleep apnea)     Uses CPAP       PAST SURGICAL HISTORY:  Past Surgical History:   Procedure Laterality Date    OTHER SURGICAL HISTORY      nose        FAMILY HISTORY:   Family History   Problem Relation Age of Onset    Snoring Mother     Hypertension Mother     Heart Disease Father     Diabetes Father     No Known Problems Sister     Cancer Maternal Grandfather         smoker, lung cancer    Cancer Paternal Grandmother         Brain , 58 years     Cancer Paternal Grandfather         lung, cancer       SOCIAL HISTORY:       10/10/2024     3:47 PM   Social History Questions Reviewed With Patient   If you work, 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 My wife and parents   Who can you count on for support throughout your weight loss surgery journey? Wife and parents       HABITS:      10/10/2024     3:47 PM   --   How often do you drink alcohol? Never   Do you currently use any of the following Nicotine products? smokeless tobacco   Have you ever used any of the following nicotine products? Cigarettes   If you previously used any of these products, what year did you quit? 2005   Have you or are you currently using street drugs or prescription strength medication for which you do not have a prescription for? No   Do you have a history of chemical dependency (alcohol or drug abuse)? No         PSYCHOLOGICAL HISTORY:       10/10/2024     3:47 PM   Psychological History Reviewed With Patient   Have you ever attempted suicide? Never.   Have you had thoughts of suicide in the past year? No   Have you ever been hospitalized for mental illness or a suicide attempt? Never.   Do you have a history of chronic pain? No   Have you ever been diagnosed with fibromyalgia? No   Are you currently being treated for any of the following? (select all that apply)  Depression    Anxiety    Bipolar    ADHD   Are you currently seeing a therapist or counselor? Yes   Are you currently seeing a psychiatrist? Yes       ROS:      10/10/2024     3:47 PM   --   Skin None of the above   HEENT None of these   Musculoskeletal None of the above   Cardiovascular None of the above   Pulmonary Snoring   Gastrointestinal None of the above   Genitourinary None of the above   Hematological None of the above   Neurological None of the above       EATING BEHAVIORS:      10/10/2024     3:47 PM   --   Have you or anyone else thought that you had an eating disorder? No   Do you currently binge eat (eat a large amount of food in a short time)? No   Are you an emotional eater? No   Do you get up to eat after falling asleep? No   Can you afford 3 meals a day? Yes   Can you afford 50-60 dollars a month for vitamins? Yes       EXERCISE:      10/10/2024     3:47 PM   --   How often do you exercise? Less than 1 time per week   What is the duration of your exercise (in minutes)? 45 Minutes   What types of exercise do you do? walking   What keeps you from being more active? I am as active as I can possbily be    Pain    Shortness of breath    Lack of Time    Too tired       MEDICATIONS:  Current Outpatient Medications   Medication Sig Dispense Refill    amLODIPine (NORVASC) 5 MG tablet Take 1 tablet (5 mg) by mouth daily 90 tablet 3    amphetamine-dextroamphetamine (ADDERALL XR) 15 MG 24 hr capsule Take 15 mg by mouth.      furosemide (LASIX) 20 MG tablet Take 1 tablet (20 mg) by mouth daily 90 tablet 1    losartan (COZAAR) 100 MG tablet Take 1 tablet (100 mg) by mouth daily 90 tablet 3    lurasidone (LATUDA) 20 MG TABS tablet Take 1 tablet by mouth daily at 2 pm.      metFORMIN (GLUCOPHAGE) 500 MG tablet TAKE 2 TABLETS BY MOUTH ONCE DAILY AFTER A MEAL*             ALLERGIES:  Allergies   Allergen Reactions    Sulfa Antibiotics Anaphylaxis     BP Readings from Last 3 Encounters:   10/04/24 (!) 148/85  "  08/09/24 130/82   04/30/24 (!) 145/91     Lab Results   Component Value Date    A1C 6.3 04/16/2024    A1C 6.0 05/23/2023    A1C 5.9 06/23/2022    A1C 5.9 09/23/2021    A1C 5.3 06/29/2020     Recent Labs   Lab Test 04/16/24  0948 06/23/22  1541   CHOL 130 155   HDL 28* 31*   LDL 76 46   TRIG 128 388*             PHYSICAL EXAM:  Objective    BP (!) 144/82 (BP Location: Right arm, Patient Position: Sitting, Cuff Size: Adult Small)   Ht 1.894 m (6' 2.55\")   Wt 147.5 kg (325 lb 3.2 oz)   BMI 41.14 kg/m    BP (!) 144/82 (BP Location: Right arm, Patient Position: Sitting, Cuff Size: Adult Small)   Ht 1.894 m (6' 2.55\")   Wt 147.5 kg (325 lb 3.2 oz)   BMI 41.14 kg/m    Body mass index is 41.14 kg/m .  Physical Exam   GENERAL: alert and no distress, tall, larger muscle mass c/w athletic build. Mallampati IV airway.   Neck of 19.25 inches. Waist of 50 inches.  NECK: no adenopathy, no asymmetry, masses, or scars  RESP: lungs clear to auscultation - no rales, rhonchi or wheezes  CV: regular rate and rhythm, normal S1 S2, no S3 or S4, no murmur, click or rub, no peripheral edema  ABDOMEN: soft, nontender, no hepatosplenomegaly, no masses and bowel sounds normal  MS: no gross musculoskeletal defects noted, no edema      In summary, Michael Cunningham has Class III obesity with a body mass index of Body mass index is 41.14 kg/m . kg/m2 and the comorbidities stated above. He completed an informational seminar and is a possible candidate for the laparoscopic gastric sleeve.  He will have to complete the following pre-requisites:    Today in the office we discussed gastric sleeve surgery. Preoperative, perioperative, and postoperative processes, management, and follow up were addressed.  Risks and benefits were outlined including the risk of death, staple line leak (1-2%), PE, DVT, ulcer, worsening GERD, N/V, stricture, hernia, wound infection, weight regain, and vitamin deficiencies. I emphasized exercise and activity along " with appropriate food choice as the main foundation for weight loss with surgery providing surgical reinforcement of this.  All questions were answered.  A goal sheet and support group handout were given to the patient. . We've covered the basics of bariatric method required for the optimization of their weight related health.  We've discussed how surgery will affect their future nutritional needs; including the general nutritional approach, vitamin supplementation and which chemical/medication irritants we wish to avoid following surgery to reduce the risk of complications. We expect to be an integral part of their on going health care following surgery and long term follow up is the expectation should it relate to their nutritional and supplement needs or bariatric anatomy. Once two years out from surgery, annual visits are the current recommendation to help maintenance of optimal weight related/bariatric health.      Once the patient has completed the requirements in their task list and there are no further recommendations, the pt will be allowed to see the surgeon of her choice for consultation on the laparoscopic gastric sleeve surgery. Patient verbalizes understanding of the process to surgery and expectations for the postoperative period including the need for lifelong lifestyle changes, vitamin supplementation, and laboratory monitoring. For patients capable of child bearing, we discussed taking precautions to avoid pregnancy for at least the first 15-18 months following their procedure.  We do not recommend high progesterone options given the effects on appetite/weight and encouraged follow up with the patient's primary care provider for birth control options.  Sincerely,     Franky Hood MD  10/10/2024  3:44 PM

## 2024-10-10 NOTE — TELEPHONE ENCOUNTER
Pt. has an appointment with Franky Hood tomorrow in person and is having difficulty finding the questionnaire he is supposed to fill out before the appointment. Please re send the information in messages if possible. Questions, Please contact Pt. At [phone]. Okay to leave a detailed message.

## 2024-10-10 NOTE — TELEPHONE ENCOUNTER
Attempted to reach pt to explain further how to complete the questionnaires. LM explaining how to navigate, encouraged pt to call back again with any questions.   Also informed him to arrive to the clinic early if he is still not able to find them at home and we can pull the questionnaires in the room for him to do.

## 2024-10-11 ENCOUNTER — OFFICE VISIT (OUTPATIENT)
Dept: SURGERY | Facility: CLINIC | Age: 44
End: 2024-10-11
Payer: COMMERCIAL

## 2024-10-11 ENCOUNTER — LAB (OUTPATIENT)
Dept: LAB | Facility: CLINIC | Age: 44
End: 2024-10-11
Payer: COMMERCIAL

## 2024-10-11 VITALS
WEIGHT: 315 LBS | BODY MASS INDEX: 39.17 KG/M2 | SYSTOLIC BLOOD PRESSURE: 144 MMHG | DIASTOLIC BLOOD PRESSURE: 82 MMHG | HEIGHT: 75 IN

## 2024-10-11 DIAGNOSIS — G47.33 OSA (OBSTRUCTIVE SLEEP APNEA): ICD-10-CM

## 2024-10-11 DIAGNOSIS — R73.9 ELEVATED BLOOD SUGAR: ICD-10-CM

## 2024-10-11 DIAGNOSIS — F17.290 OTHER TOBACCO PRODUCT NICOTINE DEPENDENCE, UNCOMPLICATED: ICD-10-CM

## 2024-10-11 DIAGNOSIS — E66.813 CLASS 3 SEVERE OBESITY DUE TO EXCESS CALORIES WITH SERIOUS COMORBIDITY AND BODY MASS INDEX (BMI) OF 40.0 TO 44.9 IN ADULT (H): Primary | ICD-10-CM

## 2024-10-11 DIAGNOSIS — I10 PRIMARY HYPERTENSION: ICD-10-CM

## 2024-10-11 DIAGNOSIS — E66.01 CLASS 3 SEVERE OBESITY DUE TO EXCESS CALORIES WITH SERIOUS COMORBIDITY AND BODY MASS INDEX (BMI) OF 40.0 TO 44.9 IN ADULT (H): Primary | ICD-10-CM

## 2024-10-11 LAB
EST. AVERAGE GLUCOSE BLD GHB EST-MCNC: 151 MG/DL
HBA1C MFR BLD: 6.9 % (ref 0–5.6)

## 2024-10-11 PROCEDURE — 36415 COLL VENOUS BLD VENIPUNCTURE: CPT

## 2024-10-11 PROCEDURE — 99205 OFFICE O/P NEW HI 60 MIN: CPT | Performed by: EMERGENCY MEDICINE

## 2024-10-11 PROCEDURE — 83036 HEMOGLOBIN GLYCOSYLATED A1C: CPT

## 2024-10-11 RX ORDER — DEXTROAMPHETAMINE SACCHARATE, AMPHETAMINE ASPARTATE MONOHYDRATE, DEXTROAMPHETAMINE SULFATE AND AMPHETAMINE SULFATE 3.75; 3.75; 3.75; 3.75 MG/1; MG/1; MG/1; MG/1
15 CAPSULE, EXTENDED RELEASE ORAL
COMMUNITY
Start: 2024-10-10

## 2024-10-11 RX ORDER — LURASIDONE HYDROCHLORIDE 20 MG/1
1 TABLET, FILM COATED ORAL
COMMUNITY
Start: 2024-10-04

## 2024-10-11 NOTE — LETTER
"10/11/2024      Michael Cunningham  [address] [place] [address]      Dear Colleague,    Thank you for referring your patient, Michael Cunningham, to the [facility]. Please see a copy of my visit note below.    60 minutes spent by me on the date of the encounter doing chart review, history and exam, documentation and further activities per the note    New Bariatric Surgery Consultation Note    October 10, 2024    RE: Michael Cunningham  MR#: [id number]  : 1980      Referring provider:       10/10/2024     3:47 PM   --   Who referred you My regular doctor       Chief Complaint/Reason for visit: evaluation for possible weight loss surgery    Dear Paresh Gill PA-C (General),    I had the pleasure of seeing your patient, Michael Cunningham, to evaluate his obesity and consider him for possible weight loss surgery. As you know, Michael Cunningham is 44 year old.  He has a height of 6' 2.547\", a weight of 325 lbs 3.2 oz, and calculated Body mass index is 41.14 kg/m ..  Today we discussed our Bariatric Surgery program to address their weight related health. He has has viewed our online seminar prior to this visit and on discussion today, he has decided to pursue the surgical program.  Given his current nicotine pouch use, we'll ramp up medication support with Zepbound given his BMI of 41, LINDA, HTN and borderline diabetes that escalated after weight gain on Abilify last year (now off). I'll check in with him in 3-4 months to see if nicotine abstinence has succeeded and if resistant to medication support, we'll continue to surgery.     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  , no kids.   Good compliance with CPAP. \"Can't sleep without it\". Given smaller airway, I anticipate incomplete resolution of LINDA but improved severity with weight loss.  Ability to avoid nicotine. Non " drinker due to strong family hx of alcoholism.  Higher muscle mass/height.       Assessment & Plan  Problem List Items Addressed This Visit    None     Plan:  Welcome to the surgical program. Given some ongoing nicotine use (pouches) we'll start medication support to help with recent borderline diabetes/complement your metformin use and provide some resistance to weight gain after quitting. Start Zepbound if covered, at 2.5mg/week for 4 weeks then increase and hold at 5mg/week.  Stop if severe abdominal pain/vomiting/rash/throat swelling (see handout below).       2. Check labs today.    3. Schedule dietician visits and start psychology visits in December to give you at least a month of being off nicotine.     4. Anticipate actigall use after surgery to reduce gallstones.    5. Attend support group at least once.    6. Check medication tolerance/nicotine cessation with me in 3-4 months.     7. Continue active lifestyle.     8. Bring CPAP to surgery. Continue good use.      HISTORY OF PRESENT ILLNESS:      10/10/2024     3:47 PM   Weight Loss History Reviewed with Patient   How long have you been overweight? Since early childhood   What is the most that you have ever weighed 326   What is the most weight you have lost? 30   I have tried the following methods to lose weight Watching portions or calories    Exercise    Weight Watchers   I have tried the following weight loss medications? (Check all that apply) None       CO-MORBIDITIES OF OBESITY INCLUDE:      10/10/2024     3:47 PM   --   I have the following health issues associated with obesity Pre-Diabetes    High Blood Pressure    Sleep Apnea   Close to diabetic levels blood sugar on 10/4/24 after starting Abilify induced weight gain.  Last Comprehensive Metabolic Panel:  Lab Results   Component Value Date     10/04/2024    POTASSIUM 3.9 10/04/2024    CHLORIDE 102 10/04/2024    CO2 25 10/04/2024    ANIONGAP 12 10/04/2024     (H) 10/04/2024    BUN 16.3  10/04/2024    CR 1.07 10/04/2024    GFRESTIMATED 89 10/04/2024    EYAD 9.2 10/04/2024       A1c borderline as well (see below).     PAST MEDICAL HISTORY:  Past Medical History:   Diagnosis Date     Anxiety 2021     Depressive disorder 2021     Hypertension 2010     LINDA (obstructive sleep apnea)     Uses CPAP       PAST SURGICAL HISTORY:  Past Surgical History:   Procedure Laterality Date     OTHER SURGICAL HISTORY      nose        FAMILY HISTORY:   Family History   Problem Relation Age of Onset     Snoring Mother      Hypertension Mother      Heart Disease Father      Diabetes Father      No Known Problems Sister      Cancer Maternal Grandfather         smoker, lung cancer     Cancer Paternal Grandmother         Brain , 58 years      Cancer Paternal Grandfather         lung, cancer       SOCIAL HISTORY:       10/10/2024     3:47 PM   Social History Questions Reviewed With Patient   If you work, 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 My wife and parents   Who can you count on for support throughout your weight loss surgery journey? Wife and parents       HABITS:      10/10/2024     3:47 PM   --   How often do you drink alcohol? Never   Do you currently use any of the following Nicotine products? smokeless tobacco   Have you ever used any of the following nicotine products? Cigarettes   If you previously used any of these products, what year did you quit? 2005   Have you or are you currently using street drugs or prescription strength medication for which you do not have a prescription for? No   Do you have a history of chemical dependency (alcohol or drug abuse)? No         PSYCHOLOGICAL HISTORY:       10/10/2024     3:47 PM   Psychological History Reviewed With Patient   Have you ever attempted suicide? Never.   Have you had thoughts of suicide in the  past year? No   Have you ever been hospitalized for mental illness or a suicide attempt? Never.   Do you have a history of chronic pain? No   Have you ever been diagnosed with fibromyalgia? No   Are you currently being treated for any of the following? (select all that apply) Depression    Anxiety    Bipolar    ADHD   Are you currently seeing a therapist or counselor? Yes   Are you currently seeing a psychiatrist? Yes       ROS:      10/10/2024     3:47 PM   --   Skin None of the above   HEENT None of these   Musculoskeletal None of the above   Cardiovascular None of the above   Pulmonary Snoring   Gastrointestinal None of the above   Genitourinary None of the above   Hematological None of the above   Neurological None of the above       EATING BEHAVIORS:      10/10/2024     3:47 PM   --   Have you or anyone else thought that you had an eating disorder? No   Do you currently binge eat (eat a large amount of food in a short time)? No   Are you an emotional eater? No   Do you get up to eat after falling asleep? No   Can you afford 3 meals a day? Yes   Can you afford 50-60 dollars a month for vitamins? Yes       EXERCISE:      10/10/2024     3:47 PM   --   How often do you exercise? Less than 1 time per week   What is the duration of your exercise (in minutes)? 45 Minutes   What types of exercise do you do? walking   What keeps you from being more active? I am as active as I can possbily be    Pain    Shortness of breath    Lack of Time    Too tired       MEDICATIONS:  Current Outpatient Medications   Medication Sig Dispense Refill     amLODIPine (NORVASC) 5 MG tablet Take 1 tablet (5 mg) by mouth daily 90 tablet 3     amphetamine-dextroamphetamine (ADDERALL XR) 15 MG 24 hr capsule Take 15 mg by mouth.       furosemide (LASIX) 20 MG tablet Take 1 tablet (20 mg) by mouth daily 90 tablet 1     losartan (COZAAR) 100 MG tablet Take 1 tablet (100 mg) by mouth daily 90 tablet 3     lurasidone (LATUDA) 20 MG TABS tablet Take  "1 tablet by mouth daily at 2 pm.       metFORMIN (GLUCOPHAGE) 500 MG tablet TAKE 2 TABLETS BY MOUTH ONCE DAILY AFTER A MEAL*             ALLERGIES:  Allergies   Allergen Reactions     Sulfa Antibiotics Anaphylaxis     BP Readings from Last 3 Encounters:   10/04/24 (!) 148/85   08/09/24 130/82   04/30/24 (!) 145/91     Lab Results   Component Value Date    A1C 6.3 04/16/2024    A1C 6.0 05/23/2023    A1C 5.9 06/23/2022    A1C 5.9 09/23/2021    A1C 5.3 06/29/2020     Recent Labs   Lab Test 04/16/24  0948 06/23/22  1541   CHOL 130 155   HDL 28* 31*   LDL 76 46   TRIG 128 388*             PHYSICAL EXAM:  Objective   BP (!) 144/82 (BP Location: Right arm, Patient Position: Sitting, Cuff Size: Adult Small)   Ht 1.894 m (6' 2.55\")   Wt 147.5 kg (325 lb 3.2 oz)   BMI 41.14 kg/m    BP (!) 144/82 (BP Location: Right arm, Patient Position: Sitting, Cuff Size: Adult Small)   Ht 1.894 m (6' 2.55\")   Wt 147.5 kg (325 lb 3.2 oz)   BMI 41.14 kg/m    Body mass index is 41.14 kg/m .  Physical Exam   GENERAL: alert and no distress, tall, larger muscle mass c/w athletic build. Mallampati IV airway.   Neck of 19.25 inches. Waist of 50 inches.  NECK: no adenopathy, no asymmetry, masses, or scars  RESP: lungs clear to auscultation - no rales, rhonchi or wheezes  CV: regular rate and rhythm, normal S1 S2, no S3 or S4, no murmur, click or rub, no peripheral edema  ABDOMEN: soft, nontender, no hepatosplenomegaly, no masses and bowel sounds normal  MS: no gross musculoskeletal defects noted, no edema      In summary, Michael Cunningham has Class III obesity with a body mass index of Body mass index is 41.14 kg/m . kg/m2 and the comorbidities stated above. He completed an informational seminar and is a possible candidate for the laparoscopic gastric sleeve.  He will have to complete the following pre-requisites:    Today in the office we discussed gastric sleeve surgery. Preoperative, perioperative, and postoperative processes, management, " and follow up were addressed.  Risks and benefits were outlined including the risk of death, staple line leak (1-2%), PE, DVT, ulcer, worsening GERD, N/V, stricture, hernia, wound infection, weight regain, and vitamin deficiencies. I emphasized exercise and activity along with appropriate food choice as the main foundation for weight loss with surgery providing surgical reinforcement of this.  All questions were answered.  A goal sheet and support group handout were given to the patient. . We've covered the basics of bariatric method required for the optimization of their weight related health.  We've discussed how surgery will affect their future nutritional needs; including the general nutritional approach, vitamin supplementation and which chemical/medication irritants we wish to avoid following surgery to reduce the risk of complications. We expect to be an integral part of their on going health care following surgery and long term follow up is the expectation should it relate to their nutritional and supplement needs or bariatric anatomy. Once two years out from surgery, annual visits are the current recommendation to help maintenance of optimal weight related/bariatric health.      Once the patient has completed the requirements in their task list and there are no further recommendations, the pt will be allowed to see the surgeon of her choice for consultation on the laparoscopic gastric sleeve surgery. Patient verbalizes understanding of the process to surgery and expectations for the postoperative period including the need for lifelong lifestyle changes, vitamin supplementation, and laboratory monitoring. For patients capable of child bearing, we discussed taking precautions to avoid pregnancy for at least the first 15-18 months following their procedure.  We do not recommend high progesterone options given the effects on appetite/weight and encouraged follow up with the patient's primary care provider for  birth control options.  Sincerely,     Franky Hood MD  10/10/2024  3:44 PM            Again, thank you for allowing me to participate in the care of your patient.        Sincerely,        Franky Hood MD

## 2024-10-11 NOTE — PATIENT INSTRUCTIONS
Plan:  Welcome to the surgical program. Given some ongoing nicotine use (pouches) we'll start medication support to help with recent borderline diabetes/complement your metformin use and provide some resistance to weight gain after quitting. Start Zepbound if covered, at 2.5mg/week for 4 weeks then increase and hold at 5mg/week.  Stop if severe abdominal pain/vomiting/rash/throat swelling (see handout below).       2. Check labs today.    3. Schedule dietician visits and start psychology visits in December to give you at least a month of being off nicotine.     4. Anticipate actigall use after surgery to reduce gallstones.    5. Attend support group at least once.    6. Check medication tolerance/nicotine cessation with me in 3-4 months.     7. Continue active lifestyle.     8. Bring CPAP to surgery. Continue good use.            Before being submitted for insurance approval, you will need the following:    -Clearance by the Psychologist  -Clearance by the dietitian  -Attend Support Group ([address] [place] [address] at [facility]) 2nd Tuesday of the month 6:30-8pm. Make sure to sign in.  -Routine Health Care Maintenance must be up to date (mammograms yearly after age 40, paps as recommended by your primary provider,  colonoscopy after age 50, earlier if high risk.  -If you are on estrogen, estrogen will be discontinued one month prior to surgery. It may be resumed one month after surgery unless otherwise advised to limit blood clotting risks.  -Pre Operative Lab work-ordered today.    -Structured weight loss visits IF mandated by your insurance carrier or bariatrician.  -Surgeon consult as we get nearer to potential surgery or sooner if you have special considerations that may make your surgery more complex.  -If you have sleep apnea you will need to be using CPAP for at least one month before surgery to reduce your risk of breathing problems after surgery. Make sure to bring your CPAP or BiPAP to the hospital at the time  of surgery.    -You will need to be tobacco free for 2 months before surgery and remain a non-smoker thereafter. If you are currently smoking or have recently quit, your urine will be evaluated for tobacco metabolites pre-operatively.  Smoking is a major risk factor for developing ulceration of your surgical sites and potential severe complications.    -If you are on insulin, you might be referred to an endocrinologist who will manage your insulin during the liquid diet and around the time of surgery. This endocrinologist does not replace your primary provider or your endocrinologist.   -You will need an exercise plan which includes MOVE, ie., walking and MUSCLE, ie.,calisthenics, bands, weight, machines, etc...Maintenance of long term weight loss and quality of life is much improved with regular exercise as the weight comes off.  ______________________________________________________________________    Remember that after your bariatric surgery, vitamin supplementation is a lifelong need.    You will take:    B-12 300-500mcg or higher sublingual (under the tongue) daily or by 1000mcg injection 1-2X/month  D3:  3000- 5000 IUs daily   Multivitamin containing 18mg of iron twice a day  Calcium citrate 1 or 2 daily    To keep your weight off and your vitamin levels up, follow-up is important.    Your labs will be monitored every 6 months for the first two years (every 3 months if you had a duodenal switch) and yearly thereafter.    To avoid ulcers in your stomach avoid tobacco forever, alcohol in excess, caffeine in excess and anti-inflammatories (NSAIDS)  (Aspirin, Ibuprofen, Naproxen and similar medications). Tylenol is fine at usual doses on the box.    If you are told by your physician take Aspirin to protect your heart or for another reason, make sure to take omeprazole or similar medication (protonix, nexium, prevacid) to protect your stomach.    Remember that alcohol affects you differently after bariatric  surgery. If you have even ONE drink DO NOT DRIVE due to rapid absorption and fast spiking of blood alcohol levels within minutes of consumption.     Slowly Increasing your exercise capacity such that 3-6 months after your surgery you're tolerating 150-250 minutes of exercise weekly will help optimize your metabolism and improve the chance of good weight loss maintenance.            Zepbound (Tirzepatide) is a very effective satiety boosting appetite suppressant that elevates satiety hormones GLP1 and GIP. It needs to be ramped up slowly to be tolerated adequately.  About 1/10 people will not tolerate this medication. Each month, you move up to a higher dose until eventually reaching the 10mg/week dose if tolerated with further ramping to follow if needed. If intolerant or severe side effects, a dose decrease would be wise, so keep me posted if not tolerated the ramping well. This may be a longer term medication based on individual needs/physiology and appetite control.     Injections can be given after cleansing the skin with alcohol prep pad or swab (available OTC).     Stop Zepbound if severe abdominal pain/vomiting/rash/throat swelling or constant nausea that prevents adequate food/water intake. Stop 2-3 weeks prior to any planned general anesthesia surgeries to reduce risk for something called a post operative ileus.     Gallstones can occur in about 1% of patients on this medication so update me if increase right upper abdominal pain after eating.     Start meals with protein first, separate beverages from meals by 20 minutes and work hard in between meals to get your 64-75 oz of water daily to reduce risks for severe constipation. Consider a fiber supplement like powdered psyllium husk in 12 oz water each night, stool softerners as needed and Miralax or milk of Magnesia if more than 3 days have passed without a Bowel Movement. Some other options include:  For Prevention and Treatment of Constipation when on  Semaglutide     From least aggressive to most aggressive:     Move: Wallking is essential - the more we move, the more our bowels move  Water: Drink water - 64oz or more a day  Go when you need to go. Don't wait. The longer you wait, the harder it gets.  Fiber: Fruit, raw veggies, nuts, whole grains, prune each night, flax/josé miguel seeds added into meals can all be helpful.   Stool Softeners: if constipation is mild and for maintenance  Gentle laxatives: Miralax, senokot, dulcolax , Smooth move tea as needed     More aggressive (and typically won't get to this point)  Milk of Magnesia  Mag Citrate (what you drink before a colonoscopy)  Suppositories  Enema      Check out [employer] for patient resources.  If you have weekends off, I recommend dosing Friday evenings.     Some people starve on this medication if not mindful about food intake. I recommend starting meals with the protein part of your meal first, chew thoroughly and separate beverages from meals by about 20 minutes to make sure you get your nourishment in first. Include vegetables/complex carbohydrates and unsaturated fat as part of your balanced diet but group these at the end of the meal, after your protein is mostly gone. Satiety will kick in too early if drinking too much with meals and under-nourishment can result.     It's not a bad idea to take a complete multivitamin most days of the week if using this medication. Lower iron content tends to be less constipating.     Adequate hydration is essential for feeling your best, efficient fat burning, waste elimination and constipation prevention. For those without fluid restrictions due to other disease, the goal is at least one ounce of water per gram of protein consumed with a  minimum of 64oz/day goal.     Pancreatitis is a very rare but potentially serious side effect. Stop Zepbound if severe mid abdominal pain/burning in nature or if unable to eat/drink due to severe nausea/discomfort.   People with  strong history of pancreatitis without clear cause should stay clear of this medication as should those planning to get pregnant, those with strong personal or family history for medullary thyroid cancer or Multiple Endocrine Neoplasia (rare).     Stop Zepbound at least 2 weeks prior to any planned surgery.  Stop until fully recovered if unexpected/emergent surgery is needed with anticipation that re-ramping will be needed if off longer than 14-16 days since last dose.    Kind Regards,  Franky Hood MD  [facility]      LEAN PROTEIN SOURCES  Getting 20-30 grams of protein, 3 meals daily, is appropriate for most people, some need more but more than about 40 grams per meal is not useful.  General rule is drinking one ounce of water per gram of protein eaten over the course of the day:  70 grams of protein each day, drink 70 oz of water.  Protein Source Portion Calories Grams of Protein                           Nonfat, plain Greek yogurt    (10 grams sugar or less) 3/4 cup (6 oz)  12-17   Light Yogurt (10 grams sugar or less) 3/4 cup (6 oz)  6-8   Protein Shake 1 shake 110-180 15-30   Skim/1% Milk or lactose-free milk 1 cup ( 8 oz)  8   Plain or light, flavored soymilk 1 cup  7-8   Plain or light, hemp milk 1 cup 110 6   Fat Free or 1% Cottage Cheese 1/2 cup 90 15   Part skim ricotta cheese 1/2 cup 100 14   Part skim or reduced fat cheese slices 1 ounce 65-80 8     Mozzarella String Cheese 1 80 8   Canned tuna, chicken, crab or salmon  (canned in water)  1/2 cup 100 15-20   White fish (broiled, grilled, baked) 3 ounces 100 21   [place]/Tuna (broiled, grilled, baked) 3 ounces 150-180 21   Shrimp, Scallops, Lobster, Crab 3 ounces 100 21   Pork loin, Pork Tenderloin 3 ounces 150 21   Boneless, skinless chicken /turkey breast                          (broiled, grilled, baked) 3 ounces 120 21   [place], [place], [place], and Venison 3 ounces 120 21   Lean cuts of  red meat and pork (sirloin,   round, tenderloin, flank, ground 93%-96%) 3 ounces 170 21   Lean or Extra Lean Ground Turkey 1/2 cup 150 20   90-95% Lean [place] Burger 1 otto 140-180 21   Low-fat casserole with lean meat 3/4 cup 200 17   Luncheon Meats                                                        (turkey, lean ham, roast beef, chicken) 3 ounces 100 21   Egg (boiled, poached, scrambled) 1 Egg 60 7   Egg Substitute 1/2 cup 70 10   Nuts (limit to 1 serving per day)  3 Tbsp. 150 7   Nut [place] (peanut, almond)  Limit to 1 serving or less daily 1 Tbsp. 90 4   Soy Burger (varies) 1  15   Garbanzo, Black, Casey Beans 1/2 cup 110 7   Refried Beans 1/2 cup 100 7   Kidney and Lima beans 1/2 cup 110 7   Tempeh 3 oz 175 18   Vegan crumbles 1/2 cup 100 14   Tofu 1/2 cup 110 14   Chili (beans and extra lean beef or turkey) 1 cup 200 23   Lentil Stew/Soup 1 cup 150 12   Black Bean Soup 1 cup 175 12             Welcome to [facility]!      We are grateful that you have chosen us to partner with you on your journey to better health!  We have included some very important information for you to read as you begin your journey towards weight loss surgery. We will discuss parts of this as you move closer to surgery but please reach out if you have any questions or concerns.      Please click on the following links and they will lead you to a printable version of each handout or copy into your browser to view/print at home:    Making Your Decision, Understanding Weight Loss Surgery  [email or web address]    A Roadmap to Your Weight Loss Surgery  [email or web address]    Honoring Choices - Your Rights  [email or web address]    Honoring Choices - [place] Health Care Directive (form that can be filled out)  [email or web address]      Insurance Coverage and Approval for Surgery  Every insurance plan is different.  Many companies approve benefits for  surgery, but many have specific requirements that must be completed prior to being approved.    Verification of benefits is the patient's responsibility.  You will be responsible for any charges not covered by your insurance plan - including, but not limited to copays, deductible, out of pocket, any amount over your cap limit, etc.  Using the guideline below, please contact your insurance plan (we recommend you call the Customer Service number on the back of your card).      Once all of the requirements for surgery are complete, you will see the surgeon.  Following this visit, we will submit your information to your insurance plan for Prior Authorization.  We strongly encourage you to submit any documentation that supports your weight loss attempts to us.    Questions that are important to ask your insurance company when you call them:    Are [facility] in my network?  Is bariatric surgery a covered benefit under my policy?  If so, what is my estimated out of pocket expense?  Are there any exclusions or cap limits on my plan for bariatric surgery?  If so, what are they?  What are the criteria necessary to be approved for bariatric surgery?  Do you require medically supervised weight loss visits for approval of surgery?  If yes, for how many months?  Within the last # of years?  Are the following procedures a covered benefit under my plan?  Laparoscopic Gastric Bypass (CPT Code 77692)  Laparoscopic Sleeve Gastrectomy (CPT Code 35399)  Nutrition/Dietitian Consult (CPT Code 41691  Nutrition/Dietitian Reassessment (CPT Code 72225  Psychological Assessment (CPT Codes 72802 & 19153      Initial labs for Bariatric Surgery    Some lab orders were placed by your doctor in the [facility] system and can be drawn at any of our outpatient hospital labs or your clinic. If you do them at one of three [facility] ([facility] in [place], [facility] in [place], [facility] in [place]) you do not  need an appointment but if you'd like to do them at a clinic, you will need to schedule an appointment with that clinic.       You will need to be fasting 10-12 hours prior (you can continue to drink water) and should have them drawn sooner verses later so if any corrections need to be made we will have time to address them.      [facility] is open 7 am - 4:30 pm Monday through Friday and 9am-12:30 pm on Saturdays.  [facility] is open 7 am -4:30 pm Monday through Friday and 8am to 11:30am on Saturday and Sundays.     If you would like them done at a clinic outside the [facility] system, then we will need to know where to fax the orders.   If you have any questions or would like help scheduling a lab appointment at the [facility], please give us a call on the Bariatric Nurse Line at [phone].        [place] [facility] for Bariatric Surgery  Eat 3 nutrient-rich meals each day     Don't skip meals--it will cause you to overeat later in the day! Space meals 4-6 hours apart    Eat around the same times each day to develop a routine eating schedule    Avoid snacking unless physically hungry.   Planned snacks: 1-2 times per day and no more than 150 calories    Eating protein and fiber (vegetables/fruits/whole grains) with meals helps you stay full     Choose foods with less than 10 grams of sugar and 5-10 grams of fat per serving to prevent excess calories and weight gain  Eat protein first    Protein helps with healing, maintaining muscle mass and good nutrition, and reducing hunger     For RNY Gastric Bypass, Sleeve Gastrectomy, and Duodenal Switch: aim for 60-80 grams of protein per day    Eat protein first, then veggies and the fruits or grains  Stop drinking 15-30 minutes before meals and wait 30-45 minutes after eating to drink    Drinking too soon before a meal may cause you to be too full to eat    Drinking too soon after you eat will cause the food to  wash  through your new  stomach too quickly--leaving you hungry and likely to eat more    Eat [id number]    Take 20-30 minutes to eat each meal by taking small bites, chewing foods to applesauce consistency or 20-30 times before you swallow    Not chewing food properly can block the opening of your pouch--causing pain, nausea, vomiting    Eating foods too fast can delay those full signals and increase overeating   Slow down your eating by smaller plates/bowls, toddler utensils, putting your fork/spoon down between bites and not watching TV/computer during meals!  Make a list of activities to prevent boredom, stress and emotional eating    Go for a walk or lift weights while watching your favorite TV show    Talk to a co-worker or email/call a friend     Keep your hands and mind busy with woodworking, puzzles, knitting or painting your nails    Practice deep breathing or meditation  Drink 64 ounces of 0-Calorie drinks between meals     Water    Zero calorie Propel , Vitamin Water Zero  or SoBe [place] , Crystal Light , Sugar-Free Milton-Aid , and other sugar-free lemonade or flavored clemons    Decaffeinated, unsweetened coffee/ tea (1 cup or less per day)   Avoid Caffeine and Carbonation- NO STRAWS    Caffeine can irritate your new pouch, increase risk for ulcers, and can increase your appetite      Carbonation can lead to increased bloating/gas and discomfort   Work up to a total of 30-60 minutes of physical activity most days of the week    Helps with losing weight and preventing weight regain after surgery     Do a combination of cardio (walking/water exercises/biking/swimming) and strength training  Take daily vitamin/mineral supplements after surgery    Daily use of vitamins/minerals is necessary for the rest of your life      Psychological Evaluation for Bariatric Surgery      Why do I need a psychological evaluation before bariatric surgery?     The psychologist's main purpose is to provide the support and education to ensure you  have the most successful outcome after surgery.   Preparing for bariatric surgery often involves changing behavior patterns. Working on these changes before surgery allows you to achieve the best results after surgery as some of these behaviors have been entrenched for many years. In addition, your relationship with food may need to change. Psychologists are experts in behavior change and are there to guide and support you as you make these important changes.   A significant life change, like losing a lot of weight, may affect many areas of your life--self-concept, physical activity and relationships with others. Your psychologist will help you prepare to adjust to these changes in a healthy way.   If you have mental health symptoms, relationship struggles, or other challenges, your psychologist will suggest how to best address these concerns so that they do not interfere with your progress toward a healthier lifestyle.       Is the psychologist looking for reasons to say I can't have surgery?   The goal is to not find specific psychological problems. Instead, the psychologist will be working with your strengths to ensure you have the most optimal outcome after surgery.  There is no expectation that you will have everything figured out already or that you must have  perfect  behaviors before moving forward with surgery. Your psychologist is expecting that you will have some behavior changes that will need to occur concurrent with the surgery.   You can feel comfortable that the psychologist is not there to    you or your habits. The psychologist is there to provide support and encourage your progress.      What should I expect during the evaluation?   During the interview, which could take place over 2 or more sessions, the psychologist will ask about:   -weight history, current eating pattern and fluid intake, and previous attempts at weight loss   -activity level   -psychiatric history  -drug, alcohol and  nicotine use   -social and developmental history  -support system   -current stressors and coping mechanisms   -understanding of the risks of surgery   -expectations for outcomes after surgery   You will complete various questionnaires that will focus on coping strategies, eating behaviors, quality of life and adverse childhood experiences in order to provide additional information to inform the assessment.   Your psychologist will likely give you some  homework assignments  to practice as you prepare for surgery. This will be individually tailored to your specific needs.   Your psychologist will talk with you about some of the typical challenges that patients might encounter after surgery and how to prepare for these.   A final report will be generated and any treatment recommendations will be discussed with you and the bariatric team to determine next steps.   If you would like, you may have any support people (e.g., spouse) join a session of the evaluation to provide additional information.       Bariatric surgery offers a physical  tool  for weight management. Similarly, your work with the psychologist will provide you with some additional emotional and behavioral  tools  as you work toward your healthier lifestyle goals.      Support Group    Support and accountability is an important part of your weight loss journey and maintenance once you reach your health goals.  Because of this, we require that you attend at least one of our support groups before you meet with the surgeon so you get a feel for what they are like and hopefully establish a connection to others who are going through a similar process or have had surgery before so you can ask your questions.    We currently have two options for support group but they are in the virtual format only at this time.  Both groups are using Microsoft Teams for their platform and you can access it through the web or an mercy that can be downloaded to a smart phone if  you have one.      The Pre- and Post-op Connections Group is on the third Tuesday of the month from 6:30-8pm and is hosted by Hernan Fernandez, PhD.  If you are interested in this group, you will need to email him at [email or web address] each month and then he will in turn send you the invitation to join.      We also have a Post-op focused Connections Group the 4th Thursday of the month from 11am-12pm that is mostly geared toward post-operative patients who are past three months post-op.  This group is hosted by Brigida Badillo, BSN, CBN, CIC and you can email her to join the group at [email or web address] each month and she will send you an invite similar to Hernan's group.  If you decide you would like to be a regular attender at this group, we can add you to an automatic invitation list of people.    You can see more information about available support groups that the [facility] offers to our patients as well by following the link:    The following Support Group information can also be found on our website:  [email or web address]      Please let us know if you have any questions about all the information above and we will be talking more about this during future visits.     Thank you,   [facility] Team    Bariatric Task List    Fax:  Please fax all paperwork to: [phone] -     Status:  Is patient a candidate for bariatric surgery?:  patient is a candidate for bariatric surgery -     Cleared to schedule surgeon consult?:  not cleared to schedule surgeon consult -     Status:  surgery evaluation in process -     Surgeon: Tj or Campbell -        Insurance: Insurance:  [employer] [place] -      Contact insurance to discuss coverage: Completed -       Other:  Call Maylin at [phone] to discuss insurance requirements. -        Patient Info: Initial Weight:  325.5#, BMI 41.14 -     Date of Initial Weight/Height:  10/11/2024 -     Goal  "Weight (lbs):  325 -     Required Weight Loss:  NO gain from initial weight. -     Surgery Type:  sleeve gastrectomy -        Dietician Visits: Structured weight loss required by insurance?:  structured weight loss required - 6 months between MD and Dietitian   Dietician Visit 1:  Completed - October-.   Dietician Visit 2:  Needed -     Dietician Visit 3:  Needed -     Dietician Visit 4:  Needed -     Dietician Visit 5:  Needed -     Dietician Visit 6:  Needed -     Clearance from dietician to see surgeon?:  Needed - Marie      Psychological Evaluation: Psych eval:  Needed - Hernan-start in December      Lab Work: Complete Blood Count:  Needed -     Comprehensive Metabolic Panel:  Needed -     Vitamin D:  Needed -     PTH:  Needed -     Hgb A1c:  Completed - 6.9 on 10/11/2024    TSH ([facility], [facility], [place]): Needed -       Ferritin: Needed -       Folate: Needed -     Thiamine: Needed -     Vitamin A: Needed -     Vitamin B12: Needed -     Zinc: Needed -     Nicotine Testing:  Needed - due 3 months out from pouches quit date   Other:  Needed - Initial labs ordered--let us know if you need help getting them done.      Consults/ Clearance Sleep Medicine:  Completed - uses CPAP, will bring to surgery.   Medical Weight Management: Needed - starting on Zepbound.      Stopping Smoking/ Alcohol Use/Cannabis Use: Quit tobacco use (3 months smoke free)?:  Needed - Let us know your quit date!!   Quit date:    -        Patient Education:  Information Session:  Completed -     Given \"Making your decision\" handout?:  Yes -     Given \"A Roadmap to you Weight Loss Surgery\" handout?: Yes -     Attended support group?:  Needed - Must attend at least once!!   Support plan in place?:  Completed - My wife and parents      Additional Surgery Requirements: Review Coag plan:  Completed - standard risks.   HgA1c <8:  Needed - w/pre-op labs.   Final nicotine screen:  Needed - w/pre-op labs.   Gallstone prevention plan (Actigall for 6 " months postop): Needed -        Final Tasks:  Before surgery online preop class:  Needed - Prior to surgery date.   After surgery online class:  Needed - 1 week after surgery w/dietitian   History and Physical: Primary Care Provider -   Needed - within 30 days of surgery   Final labs per clinic: Needed - within 30 days of surgery   Chest xray per clinic: Needed - within 90 days of surgery   Electrocardiogram (ECG) per clinic: Needed - within 90 days of surgery   Schedule postop appointments: Needed - Maylin to set up when scheduling surgery.      Notes: Will start Zepbound to improve satiety/help with nicotine cessation weight gain risks and for support of his HTN/LINDA and Class III obesity. Can stop one week prior to preop liquid diet.     Check medication tolerance/nicotine cessation with  in 3-4 months -

## 2024-10-11 NOTE — Clinical Note
New surg intro. Nicotine pouch user, needs quit date in next couple weeks. Started Zepbound, can stop 2-3 weeks prior to surgery. I'll see him in 3-4 months to see how quitting has gone and med check. Franky Hood MD

## 2024-11-11 ENCOUNTER — VIRTUAL VISIT (OUTPATIENT)
Dept: SURGERY | Facility: CLINIC | Age: 44
End: 2024-11-11
Payer: COMMERCIAL

## 2024-11-11 DIAGNOSIS — E66.813 CLASS 3 SEVERE OBESITY DUE TO EXCESS CALORIES WITH SERIOUS COMORBIDITY AND BODY MASS INDEX (BMI) OF 40.0 TO 44.9 IN ADULT (H): Primary | ICD-10-CM

## 2024-11-11 DIAGNOSIS — Z71.3 NUTRITIONAL COUNSELING: ICD-10-CM

## 2024-11-11 DIAGNOSIS — E66.01 CLASS 3 SEVERE OBESITY DUE TO EXCESS CALORIES WITH SERIOUS COMORBIDITY AND BODY MASS INDEX (BMI) OF 40.0 TO 44.9 IN ADULT (H): Primary | ICD-10-CM

## 2024-11-11 PROCEDURE — 97802 MEDICAL NUTRITION INDIV IN: CPT | Mod: 95 | Performed by: DIETITIAN, REGISTERED

## 2024-11-11 NOTE — LETTER
"11/11/2024      Michael Cunningham  [address] [place] [address]      Dear Colleague,    Thank you for referring your patient, Michael Cunningham, to the [facility] [place]. Please see a copy of my visit note below.    Michael Cunningham is a 44 year old who is being evaluated via a billable video visit.      How would you like to obtain your AVS? MyChart  If the video visit is dropped, the invitation should be resent by: Send to e-mail at: [email or web address]  Will anyone else be joining your video visit? No        Initial Structured Weight Loss Supervised Diet Evaluation     Assessment:  Michael is being seen today for initial RD nutritional evaluation. Patient has been unsuccessful with non-surgical weight loss methods and is interested in bariatric surgery. Today we reviewed current eating habits and level of physical activity, and instructed on the changes that are required for successful bariatric outcomes.      Surgery of interest per pt: LSG.    Workflow review:  Support Group: Not completed.  Psychology: scheduled, will start in December .  Lab work:Completed.  SWL:Yes 2/6  Nicotine free for 4 weeks!    Weight goal: At or below initial.    Anthropometrics:  Pt's weight is 0 lbs 0 oz  Initial weight: 325lb  Weight change: none  BMI: There is no height or weight on file to calculate BMI.   Ideal body weight: 83.5 kg (183 lb 15.9 oz)  Adjusted ideal body weight: 109.1 kg (240 lb 7.6 oz)    Medical History:  Patient Active Problem List   Diagnosis     ADHD (attention deficit hyperactivity disorder)     Hypertension     Hypertriglyceridemia     LINDA (obstructive sleep apnea)     Prediabetes     Pulmonary nodule     Morbid obesity (H)     Depression, unspecified depression type      Diabetes: pre-diabetes  HbA1c:  No results found for: \"HGBA1C\"  Biggest struggle with weight loss: hard time feeling full, eating out a lot- would rather eat out than cook    Nutrition " History  Food allergies/intolerances/cultural or religous food customs: No   Diet history: has been overweight his entire life, took a medication that led to weight gain  -has a hard time feeling full  Vitamins/Mineral currently taking: none    Socioeconomic Status  Who does the grocery shopping for your household? Self and wife  Where do you grocery shop?: [employer], [employer], [employer]  Who prepares your meals at home? Wife, really relying on conveince    Diet Recall/Time  Breakfast: wake at 5am, leftover pizza (food with medication)  Lunch: 2 cans of soup  Snacked on trail mix  Dinner: leftover pizza, chips  -grazing    Overnight eating: No    Eating out: usually dinner    Beverages  Diet pepsi-3-4 bottles    Exercise  Walk about a mile at work, yoga at work     Nutrition Diagnosis (PES statement)    Morbid obesity related to excessive calorie intake as evidenced by Intake of high caloric density foods/beverages (juice, soda, alcohol) at meals and/or snacks; large portions; frequent grazing; Estimated intake that exceeds estimated daily energy intake; Binge eating patterns; Frequent excessive fast food or restaurant intake; and BMI 41.14         Intervention    Nutrition Education:   Provided general overview of diet and lifestyle modifications needed to be a deemed a safe candidate for bariatric surgery.   Educated patient on how to read a food label: choosing foods with than 10 grams fat and 10 grams sugar per serving to avoid dumping syndrome.    Food/Nutrient Delivery:  Educated patient on eating three meals, with cutting out snacking.  Educated patient on using a protein powder drink as a meal replacement and/or supplement after bariatric surgery.   Discussed importance of adequate hydration after surgery, with goal of at least 64 oz of fluids/day.  Addressed avoiding all carbonated, caffeinated and sweetened drinks to prepare for bariatric surgery.     Nutrition Counseling:  Mindful eating techniques: Encouraged slow  meal pace, chewing foods to applesauce consistency for 20-30 minutes/meal.   Discussed  fluids 30 minutes before, during, and after meal to prevent dumping syndrome and discomfort post bariatric surgery.       Instructions/Goals:     Start implementing bariatric surgery lifestyle modifications.  Focus on increasing protein- aim for at least 30g per meal  Eliminate soda, increase water    Handouts Provided:   Quick meals  Protein meal plan    Monitor/Evaluation:  Pt. s target weight: no gain from initial visit, patient verbalized understanding.     Plan for next visit:   Bariatric plate.       Video-Visit Details    Type of service:  Video Visit    Video Start Time (time video started): 8:20a    Video End Time (time video stopped): 9:00a    Originating Location (pt. Location): Home      Distant Location (provider location):  Off-site    Mode of Communication:  Video Conference via [facility]    Physician has received verbal consent for a Video Visit from the patient? Yes      Marie Floyd RD        Again, thank you for allowing me to participate in the care of your patient.        Sincerely,        Marie Floyd RD

## 2024-11-11 NOTE — PROGRESS NOTES
"Michael Cunningham is a 44 year old who is being evaluated via a billable video visit.      How would you like to obtain your AVS? MyChart  If the video visit is dropped, the invitation should be resent by: Send to e-mail at: [email or web address]  Will anyone else be joining your video visit? No        Initial Structured Weight Loss Supervised Diet Evaluation     Assessment:  Michael is being seen today for initial RD nutritional evaluation. Patient has been unsuccessful with non-surgical weight loss methods and is interested in bariatric surgery. Today we reviewed current eating habits and level of physical activity, and instructed on the changes that are required for successful bariatric outcomes.      Surgery of interest per pt: LSG.    Workflow review:  Support Group: Not completed.  Psychology: scheduled, will start in December .  Lab work:Completed.  SWL:Yes 2/6  Nicotine free for 4 weeks!    Weight goal: At or below initial.    Anthropometrics:  Pt's weight is 0 lbs 0 oz  Initial weight: 325lb  Weight change: none  BMI: There is no height or weight on file to calculate BMI.   Ideal body weight: 83.5 kg (183 lb 15.9 oz)  Adjusted ideal body weight: 109.1 kg (240 lb 7.6 oz)    Medical History:  Patient Active Problem List   Diagnosis    ADHD (attention deficit hyperactivity disorder)    Hypertension    Hypertriglyceridemia    LINDA (obstructive sleep apnea)    Prediabetes    Pulmonary nodule    Morbid obesity (H)    Depression, unspecified depression type      Diabetes: pre-diabetes  HbA1c:  No results found for: \"HGBA1C\"  Biggest struggle with weight loss: hard time feeling full, eating out a lot- would rather eat out than cook    Nutrition History  Food allergies/intolerances/cultural or religous food customs: No   Diet history: has been overweight his entire life, took a medication that led to weight gain  -has a hard time feeling full  Vitamins/Mineral currently taking: none    Socioeconomic Status  Who does " the grocery shopping for your household? Self and wife  Where do you grocery shop?: [employer], [employer], [employer]  Who prepares your meals at home? Wife, really relying on conveince    Diet Recall/Time  Breakfast: wake at 5am, leftover pizza (food with medication)  Lunch: 2 cans of soup  Snacked on trail mix  Dinner: leftover pizza, chips  -grazing    Overnight eating: No    Eating out: usually dinner    Beverages  Diet pepsi-3-4 bottles    Exercise  Walk about a mile at work, yoga at work     Nutrition Diagnosis (PES statement)    Morbid obesity related to excessive calorie intake as evidenced by Intake of high caloric density foods/beverages (juice, soda, alcohol) at meals and/or snacks; large portions; frequent grazing; Estimated intake that exceeds estimated daily energy intake; Binge eating patterns; Frequent excessive fast food or restaurant intake; and BMI 41.14         Intervention    Nutrition Education:   Provided general overview of diet and lifestyle modifications needed to be a deemed a safe candidate for bariatric surgery.   Educated patient on how to read a food label: choosing foods with than 10 grams fat and 10 grams sugar per serving to avoid dumping syndrome.    Food/Nutrient Delivery:  Educated patient on eating three meals, with cutting out snacking.  Educated patient on using a protein powder drink as a meal replacement and/or supplement after bariatric surgery.   Discussed importance of adequate hydration after surgery, with goal of at least 64 oz of fluids/day.  Addressed avoiding all carbonated, caffeinated and sweetened drinks to prepare for bariatric surgery.     Nutrition Counseling:  Mindful eating techniques: Encouraged slow meal pace, chewing foods to applesauce consistency for 20-30 minutes/meal.   Discussed  fluids 30 minutes before, during, and after meal to prevent dumping syndrome and discomfort post bariatric surgery.       Instructions/Goals:     Start implementing bariatric  surgery lifestyle modifications.  Focus on increasing protein- aim for at least 30g per meal  Eliminate soda, increase water    Handouts Provided:   Quick meals  Protein meal plan    Monitor/Evaluation:  Pt. s target weight: no gain from initial visit, patient verbalized understanding.     Plan for next visit:   Bariatric plate.       Video-Visit Details    Type of service:  Video Visit    Video Start Time (time video started): 8:20a    Video End Time (time video stopped): 9:00a    Originating Location (pt. Location): Home      Distant Location (provider location):  Off-site    Mode of Communication:  Video Conference via [facility]    Physician has received verbal consent for a Video Visit from the patient? Yes      Marie Floyd RD

## 2024-12-04 ENCOUNTER — VIRTUAL VISIT (OUTPATIENT)
Dept: SURGERY | Facility: CLINIC | Age: 44
End: 2024-12-04
Payer: COMMERCIAL

## 2024-12-04 DIAGNOSIS — E66.01 MORBID OBESITY (H): Primary | ICD-10-CM

## 2024-12-04 NOTE — PROGRESS NOTES
Virtual Visit Details    Type of service:  Video Visit     Originating Location (pt. Location): Home    Distant Location (provider location):  Off-site  Platform used for Video Visit: Zoom (Telehealth)    Start Time: 2:17 PM  End Time: 3:02 PM    Michael would like to follow through with VSG for health reasons. He has struggled with his weight for much of his life and now is concerned about various comorbidities. He has a history of depression and anxiety for which he is in psychotherapy and is taking Latuda. He did experience trauma on his job as a  for [employer]. He is a stress, emotional and boredom eater and tends to internalize his emotions. He has good knowledge of surgery and good support. He will follow up and complete psychological testing. F31.9; F43.9; E66.01

## 2024-12-05 DIAGNOSIS — E66.813 CLASS 3 SEVERE OBESITY DUE TO EXCESS CALORIES WITH SERIOUS COMORBIDITY AND BODY MASS INDEX (BMI) OF 40.0 TO 44.9 IN ADULT (H): ICD-10-CM

## 2024-12-05 DIAGNOSIS — E66.01 CLASS 3 SEVERE OBESITY DUE TO EXCESS CALORIES WITH SERIOUS COMORBIDITY AND BODY MASS INDEX (BMI) OF 40.0 TO 44.9 IN ADULT (H): ICD-10-CM

## 2024-12-05 DIAGNOSIS — I10 PRIMARY HYPERTENSION: ICD-10-CM

## 2024-12-05 DIAGNOSIS — G47.33 OSA (OBSTRUCTIVE SLEEP APNEA): ICD-10-CM

## 2024-12-18 ENCOUNTER — LAB (OUTPATIENT)
Dept: LAB | Facility: CLINIC | Age: 44
End: 2024-12-18
Payer: COMMERCIAL

## 2024-12-18 ENCOUNTER — OFFICE VISIT (OUTPATIENT)
Dept: SURGERY | Facility: CLINIC | Age: 44
End: 2024-12-18
Payer: COMMERCIAL

## 2024-12-18 VITALS
DIASTOLIC BLOOD PRESSURE: 70 MMHG | HEIGHT: 75 IN | WEIGHT: 315 LBS | SYSTOLIC BLOOD PRESSURE: 118 MMHG | BODY MASS INDEX: 39.17 KG/M2

## 2024-12-18 DIAGNOSIS — I10 PRIMARY HYPERTENSION: ICD-10-CM

## 2024-12-18 DIAGNOSIS — E66.813 CLASS 3 SEVERE OBESITY DUE TO EXCESS CALORIES WITH SERIOUS COMORBIDITY AND BODY MASS INDEX (BMI) OF 40.0 TO 44.9 IN ADULT (H): Primary | ICD-10-CM

## 2024-12-18 DIAGNOSIS — E66.01 CLASS 3 SEVERE OBESITY DUE TO EXCESS CALORIES WITH SERIOUS COMORBIDITY AND BODY MASS INDEX (BMI) OF 40.0 TO 44.9 IN ADULT (H): Primary | ICD-10-CM

## 2024-12-18 DIAGNOSIS — E11.9 TYPE 2 DIABETES MELLITUS WITHOUT COMPLICATION, WITHOUT LONG-TERM CURRENT USE OF INSULIN (H): ICD-10-CM

## 2024-12-18 DIAGNOSIS — G47.33 OSA (OBSTRUCTIVE SLEEP APNEA): ICD-10-CM

## 2024-12-18 DIAGNOSIS — E66.813 CLASS 3 SEVERE OBESITY DUE TO EXCESS CALORIES WITH SERIOUS COMORBIDITY AND BODY MASS INDEX (BMI) OF 40.0 TO 44.9 IN ADULT (H): ICD-10-CM

## 2024-12-18 DIAGNOSIS — E66.01 CLASS 3 SEVERE OBESITY DUE TO EXCESS CALORIES WITH SERIOUS COMORBIDITY AND BODY MASS INDEX (BMI) OF 40.0 TO 44.9 IN ADULT (H): ICD-10-CM

## 2024-12-18 LAB
ERYTHROCYTE [DISTWIDTH] IN BLOOD BY AUTOMATED COUNT: 11.9 % (ref 10–15)
EST. AVERAGE GLUCOSE BLD GHB EST-MCNC: 123 MG/DL
FOLATE SERPL-MCNC: 10.9 NG/ML (ref 4.6–34.8)
HBA1C MFR BLD: 5.9 % (ref 0–5.6)
HCT VFR BLD AUTO: 48.1 % (ref 40–53)
HGB BLD-MCNC: 17.6 G/DL (ref 13.3–17.7)
MCH RBC QN AUTO: 32.3 PG (ref 26.5–33)
MCHC RBC AUTO-ENTMCNC: 36.6 G/DL (ref 31.5–36.5)
MCV RBC AUTO: 88 FL (ref 78–100)
PLATELET # BLD AUTO: 215 10E3/UL (ref 150–450)
PTH-INTACT SERPL-MCNC: 38 PG/ML (ref 15–65)
RBC # BLD AUTO: 5.45 10E6/UL (ref 4.4–5.9)
WBC # BLD AUTO: 5.5 10E3/UL (ref 4–11)

## 2024-12-18 PROCEDURE — 84590 ASSAY OF VITAMIN A: CPT | Mod: 90

## 2024-12-18 PROCEDURE — 99214 OFFICE O/P EST MOD 30 MIN: CPT | Performed by: EMERGENCY MEDICINE

## 2024-12-18 PROCEDURE — 83970 ASSAY OF PARATHORMONE: CPT

## 2024-12-18 PROCEDURE — 82607 VITAMIN B-12: CPT

## 2024-12-18 PROCEDURE — 99000 SPECIMEN HANDLING OFFICE-LAB: CPT

## 2024-12-18 PROCEDURE — 80053 COMPREHEN METABOLIC PANEL: CPT

## 2024-12-18 PROCEDURE — 82746 ASSAY OF FOLIC ACID SERUM: CPT

## 2024-12-18 PROCEDURE — 84425 ASSAY OF VITAMIN B-1: CPT | Mod: 90

## 2024-12-18 PROCEDURE — 82728 ASSAY OF FERRITIN: CPT

## 2024-12-18 PROCEDURE — 82306 VITAMIN D 25 HYDROXY: CPT

## 2024-12-18 PROCEDURE — 84443 ASSAY THYROID STIM HORMONE: CPT

## 2024-12-18 PROCEDURE — 84630 ASSAY OF ZINC: CPT | Mod: 90

## 2024-12-18 PROCEDURE — 36415 COLL VENOUS BLD VENIPUNCTURE: CPT

## 2024-12-18 PROCEDURE — 85027 COMPLETE CBC AUTOMATED: CPT

## 2024-12-18 PROCEDURE — 83036 HEMOGLOBIN GLYCOSYLATED A1C: CPT

## 2024-12-18 NOTE — PATIENT INSTRUCTIONS
Plan   You can check labs in late January/early February to make sure all is on track with diabetes control and vitamin levels as we get ready for surgery later this winter.   Great work getting off nicotine.  Increase Tirzepatide to 7.5mg/week for 4 weeks then increase to 10mg/week if tolerated. If too strong, we'll go back down in dose to the 5mg/week dose. Anticipate stopping 10-14 days prior to surgery.   Continue clearance with psychology and dietician.           Before being submitted for insurance approval, you will need the following:    -Clearance by the Psychologist  -Clearance by the dietitian  -Attend Support Group ([address] [place] [address] at [facility]) 2nd Tuesday of the month 6:30-8pm. Make sure to sign in.  -Routine Health Care Maintenance must be up to date (mammograms yearly after age 40, paps as recommended by your primary provider,  colonoscopy after age 50, earlier if high risk.  -If you are on estrogen, estrogen will be discontinued one month prior to surgery. It may be resumed one month after surgery unless otherwise advised to limit blood clotting risks.  -Pre Operative Lab work-ordered today.    -Structured weight loss visits IF mandated by your insurance carrier or bariatrician.  -Surgeon consult as we get nearer to potential surgery or sooner if you have special considerations that may make your surgery more complex.  -If you have sleep apnea you will need to be using CPAP for at least one month before surgery to reduce your risk of breathing problems after surgery. Make sure to bring your CPAP or BiPAP to the hospital at the time of surgery.    -You will need to be tobacco free for 2 months before surgery and remain a non-smoker thereafter. If you are currently smoking or have recently quit, your urine will be evaluated for tobacco metabolites pre-operatively.  Smoking is a major risk factor for developing ulceration of your surgical sites and potential severe complications.    -If you are on  insulin, you might be referred to an endocrinologist who will manage your insulin during the liquid diet and around the time of surgery. This endocrinologist does not replace your primary provider or your endocrinologist.   -You will need an exercise plan which includes MOVE, ie., walking and MUSCLE, ie.,calisthenics, bands, weight, machines, etc...Maintenance of long term weight loss and quality of life is much improved with regular exercise as the weight comes off.  ______________________________________________________________________    Remember that after your bariatric surgery, vitamin supplementation is a lifelong need.    You will take:    B-12 300-500mcg or higher sublingual (under the tongue) daily or by 1000mcg injection 1-2X/month  D3:  3000- 5000 IUs daily   Multivitamin containing 18mg of iron twice a day  Calcium citrate 1 or 2 daily    To keep your weight off and your vitamin levels up, follow-up is important.    Your labs will be monitored every 6 months for the first two years (every 3 months if you had a duodenal switch) and yearly thereafter.    To avoid ulcers in your stomach avoid tobacco forever, alcohol in excess, caffeine in excess and anti-inflammatories (NSAIDS)  (Aspirin, Ibuprofen, Naproxen and similar medications). Tylenol is fine at usual doses on the box.    If you are told by your physician take Aspirin to protect your heart or for another reason, make sure to take omeprazole or similar medication (protonix, nexium, prevacid) to protect your stomach.    Remember that alcohol affects you differently after bariatric surgery. If you have even ONE drink DO NOT DRIVE due to rapid absorption and fast spiking of blood alcohol levels within minutes of consumption.     Slowly Increasing your exercise capacity such that 3-6 months after your surgery you're tolerating 150-250 minutes of exercise weekly will help optimize your metabolism and improve the chance of good weight loss  maintenance.              LEAN PROTEIN SOURCES  Getting 20-30 grams of protein, 3 meals daily, is appropriate for most people, some need more but more than about 40 grams per meal is not useful.  General rule is drinking one ounce of water per gram of protein eaten over the course of the day:  70 grams of protein each day, drink 70 oz of water.  Protein Source Portion Calories Grams of Protein                           Nonfat, plain Greek yogurt    (10 grams sugar or less) 3/4 cup (6 oz)  12-17   Light Yogurt (10 grams sugar or less) 3/4 cup (6 oz)  6-8   Protein Shake 1 shake 110-180 15-30   Skim/1% Milk or lactose-free milk 1 cup ( 8 oz)  8   Plain or light, flavored soymilk 1 cup  7-8   Plain or light, hemp milk 1 cup 110 6   Fat Free or 1% Cottage Cheese 1/2 cup 90 15   Part skim ricotta cheese 1/2 cup 100 14   Part skim or reduced fat cheese slices 1 ounce 65-80 8     Mozzarella String Cheese 1 80 8   Canned tuna, chicken, crab or salmon  (canned in water)  1/2 cup 100 15-20   White fish (broiled, grilled, baked) 3 ounces 100 21   G[place]lord/Tuna (broiled, grilled, baked) 3 ounces 150-180 21   Shrimp, Scallops, Lobster, Crab 3 ounces 100 21   Pork loin, Pork Tenderloin 3 ounces 150 21   Boneless, skinless chicken /turkey breast                          (broiled, grilled, baked) 3 ounces 120 21   [place], [place], [place], and Venison 3 ounces 120 21   Lean cuts of red meat and pork (sirloin,   round, tenderloin, flank, ground 93%-96%) 3 ounces 170 21   Lean or Extra Lean Ground Turkey 1/2 cup 150 20   90-95% Lean [place] Burger 1 otto 140-180 21   Low-fat casserole with lean meat 3/4 cup 200 17   Luncheon Meats                                                        (turkey, lean ham, roast beef, chicken) 3 ounces 100 21   Egg (boiled, poached, scrambled) 1 Egg 60 7   Egg Substitute 1/2 cup 70 10   Nuts (limit to 1 serving per day)  3 Tbsp. 150 7   Nut Pastos (peanut, almond)  Limit to 1 serving  or less daily 1 Tbsp. 90 4   Soy Burger (varies) 1  15   Garbanzo, Black, Casey Beans 1/2 cup 110 7   Refried Beans 1/2 cup 100 7   Kidney and Lima beans 1/2 cup 110 7   Tempeh 3 oz 175 18   Vegan crumbles 1/2 cup 100 14   Tofu 1/2 cup 110 14   Chili (beans and extra lean beef or turkey) 1 cup 200 23   Lentil Stew/Soup 1 cup 150 12   Black Bean Soup 1 cup 175 12         Zepbound/Mounjaro (Tirzepatide) is a very effective satiety boosting appetite suppressant that elevates satiety hormones GLP1 and GIP. It needs to be ramped up slowly to be tolerated adequately.  It helps release insulin in response to food when blood sugar runs higher than normal and is very helpful for diabetics in managing blood sugar levels with low risks for any low blood sugars.  About 1/10 people will not tolerate this medication. Each month, you move up to a higher dose until eventually reaching the 10mg/week dose if tolerated with further ramping to follow if needed. If intolerant or severe side effects, a dose decrease would be wise, so keep me posted if not tolerated the ramping well. This may be a longer term medication based on individual needs/physiology and appetite control.     Injections can be given after cleansing the skin with alcohol prep pad or swab (available OTC).     Stop Zepbound if severe abdominal pain/vomiting/rash/throat swelling or constant nausea that prevents adequate food/water intake. Stop 2-3 weeks prior to any planned general anesthesia surgeries to reduce risk for something called a post operative ileus.     Gallstones can occur in about 1% of patients on this medication so update me if increase right upper abdominal pain after eating.     Start meals with protein first, separate beverages from meals by 20 minutes and work hard in between meals to get your 64-75 oz of water daily to reduce risks for severe constipation. Consider a fiber supplement like powdered psyllium husk in 12 oz water each night, stool  softerners as needed and Miralax or milk of Magnesia if more than 3 days have passed without a Bowel Movement. Some other options include:  For Prevention and Treatment of Constipation when on Semaglutide     From least aggressive to most aggressive:     Move: Wallking is essential - the more we move, the more our bowels move  Water: Drink water - 64oz or more a day  Go when you need to go. Don't wait. The longer you wait, the harder it gets.  Fiber: Fruit, raw veggies, nuts, whole grains, prune each night, flax/josé miguel seeds added into meals can all be helpful.   Stool Softeners: if constipation is mild and for maintenance  Gentle laxatives: Miralax, senokot, dulcolax , Smooth move tea as needed     More aggressive (and typically won't get to this point)  Milk of Magnesia  Mag Citrate (what you drink before a colonoscopy)  Suppositories  Enema      Check out [employer] for patient resources.  If you have weekends off, I recommend dosing Friday evenings.     Some people starve on this medication if not mindful about food intake. I recommend starting meals with the protein part of your meal first, chew thoroughly and separate beverages from meals by about 20 minutes to make sure you get your nourishment in first. Include vegetables/complex carbohydrates and unsaturated fat as part of your balanced diet but group these at the end of the meal, after your protein is mostly gone. Satiety will kick in too early if drinking too much with meals and under-nourishment can result.     It's not a bad idea to take a complete multivitamin most days of the week if using this medication. Lower iron content tends to be less constipating.     Adequate hydration is essential for feeling your best, efficient fat burning, waste elimination and constipation prevention. For those without fluid restrictions due to other disease, the goal is at least one ounce of water per gram of protein consumed with a  minimum of 64oz/day goal.      Pancreatitis is a very rare but potentially serious side effect. Stop Zepbound if severe mid abdominal pain/burning in nature or if unable to eat/drink due to severe nausea/discomfort.   People with strong history of pancreatitis without clear cause should stay clear of this medication as should those planning to get pregnant, those with strong personal or family history for medullary thyroid cancer or Multiple Endocrine Neoplasia (rare).     Stop Zepbound at least 2 weeks prior to any planned surgery.  Stop until fully recovered if unexpected/emergent surgery is needed with anticipation that re-ramping will be needed if off longer than 14-16 days since last dose.    Kind Regards,  Franky Hood MD  [facility]

## 2024-12-18 NOTE — LETTER
12/18/2024      Michael Cunningham  [address] [place] [address]      Dear Colleague,    Thank you for referring your patient, Michael Cunningham, to the [facility] SURGERY [facility] AND BARIATRICS CARE [place]. Please see a copy of my visit note below.    Outpatient Bariatric Medicine Progress Note-Structured Weight Loss   Indication: Medical bariatric therapy to precede bariatric surgery    Surgery:  Sleeve Gastrectomy    Surgeon: CINTHYA    Impression     44 year old male with Body mass index is 41.24 kg/m . in the setting of morbid obesity which satisfies the NIH criteria for bariatric surgery. He is working towards surgery and at his 10/11/24 intake visit was attempting to quit nicotine.  Today, he reports nicotine use is done.     Zepbound started at our intro visit for dual benefit on morbid obesity and new onset type II diabetes as reflected below. He's ramped to 5mg/week.  In follow up today he's weight neutral at the 5mg/week dose. We'll plan to ramp up to the 7.5mg/week dose of tirzepatide and then 10mg/week if tolerated.   Lab Results   Component Value Date    A1C 6.9 10/11/2024    A1C 6.3 04/16/2024    A1C 6.0 05/23/2023    A1C 5.9 06/23/2022    A1C 5.9 09/23/2021     His weight is stable. .     He's met with dietician and bariatric psychology and is progressing towards clearances.     Comorbidities:  Patient Active Problem List   Diagnosis     ADHD (attention deficit hyperactivity disorder)     Hypertension     Hypertriglyceridemia     LINDA (obstructive sleep apnea)     Prediabetes     Pulmonary nodule     Morbid obesity (H)     Depression, unspecified depression type       Plan   You can check labs in late January/early February to make sure all is on track with diabetes control and vitamin levels as we get ready for surgery later this winter.   Great work getting off nicotine.  Increase Tirzepatide to 7.5mg/week for 4 weeks then increase to 10mg/week if tolerated. If too strong, we'll go back down  in dose to the 5mg/week dose. Anticipate stopping 10-14 days prior to surgery.   Continue clearance with psychology and dietician.   Past Surgical History        Past Surgical History:   Procedure Laterality Date     OTHER SURGICAL HISTORY      nose        Family History, Social History   Reviewed as documented. See time and date confirmation.    Interim History/Pre-op needs list    Initial Labs Complete and Reviewed: will be done at next lab draw  Dietitian Visits Initiated/cleared: started  Weight Change since initial weight: neutral  Psychologist visits initiated/cleared: started  HCM UTD:    Pap: n/a   Mammogram: n/a   Colonscopy:  n/a   Other: NA  Sugars Well Controlled: yes. A1c recheck due in 6 weeks.  Cardiac: n/a  Pulmonary: n/a  Hormone use: no  Birth control n/a.  Other: .  Medications and Allergies      Current Outpatient Medications   Medication Sig Dispense Refill     amLODIPine (NORVASC) 5 MG tablet Take 1 tablet (5 mg) by mouth daily 90 tablet 3     amphetamine-dextroamphetamine (ADDERALL XR) 15 MG 24 hr capsule Take 15 mg by mouth.       losartan (COZAAR) 100 MG tablet Take 1 tablet (100 mg) by mouth daily 90 tablet 3     lurasidone (LATUDA) 20 MG TABS tablet Take 1 tablet by mouth daily at 2 pm.       metFORMIN (GLUCOPHAGE) 500 MG tablet TAKE 2 TABLETS BY MOUTH ONCE DAILY AFTER A MEAL*       tirzepatide-Weight Management (ZEPBOUND) 5 MG/0.5ML prefilled pen Inject 0.5 mLs (5 mg) subcutaneously every 7 days. 2 mL 3     furosemide (LASIX) 20 MG tablet Take 1 tablet (20 mg) by mouth daily (Patient not taking: Reported on 12/18/2024) 90 tablet 1     Allergies: Sulfa antibiotics    Habits   Tobacco Use: Oct 18th quit date  Car projects for fun.  Working out at work.   Dietary History   Reviewed proper bariatric method again today.  The patient has been working with our bariatric dieticians and has upcoming/ongoing visits for his 6 mo SWL requirements/training.    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    Review of Systems     GENERAL/CONSTITUTIONAL:  Fatigue: no  HEENT:  Dental Pain: no  Trouble Swallowing: no  CARDIOVASCULAR:  Chest pain with exertion: no  Syncope: no.  PULMONARY:  CPAP Use: no  Asthma Controlled: yes  GASTROINTESTINAL:  GERD/Heartburn: no  Gallbladder: no issues/intact  UROLOGIC:  Urinary Symptoms: no  NEUROLOGIC:  Headaches: no  Paresthesias: no  PSYCHIATRIC:  Moods: good  MUSCULOSKELETAL/RHEUMATOLOGIC  Arthralgias: no  Myalgias: had back strain 2 months ago and responsed to steroid pack  ENDOCRINE:  Monitoring Blood Sugars: rare  Sugars Well Controlled: yes, due for recheck in 6 weeks.  DERMATOLOGIC:  Rashes: no  Physical Exam   Vitals: /70 (BP Location: Right arm)   Ht 1.894 m (6' 2.55\")   Wt 147.9 kg (326 lb)   BMI 41.24 kg/m    Body mass index is 41.24 kg/m .  GENERAL: appears his stated age. Ambulation is independent.  NECK:thick.  HEART: Rhythm regular, rate regular, no murmur   LUNGS: Clear   ABDOMEN: soft, non-tender, obese,no rashes, surgical scars absent. No guarding.  MUSCULOSKELETAL: no obvious deformities  VASCULAR: palpable peripheral pulses, no  lower extremity edema, yes color changes of venous stasis,  ulcerations  SKIN: Rashes:     Counseling     We discussed the National Weight Control Registry and Healthy Weight Maintenance Strategies.   We discussed ways to optimize his metabolism.  We discussed specific exercise goals and dietary habits conducive to a healthy weight.  We discussed the importance of lifelong vitamin supplementation and the potential medical complications of vitamin non-compliance.  We discussed the importance of restorative sleep and stress management in maintaining a healthy weight.  I reminded him to avoid alcohol for 1 year post-operatively, to " avoid NSAIDS for life unless specifically indicated and used with a concomitant PPI, to avoid caffeine in excess, and explained the importance of lifelong tobacco abstinence.  Medical Decision Makin minutes spent by me on the date of the encounter doing chart review, history and exam, documentation and further activities per the note      Franky Hood MD  [facility]  2024  7:37 AM      Again, thank you for allowing me to participate in the care of your patient.        Sincerely,        Franky Hood MD

## 2024-12-18 NOTE — PROGRESS NOTES
Outpatient Bariatric Medicine Progress Note-Structured Weight Loss   Indication: Medical bariatric therapy to precede bariatric surgery    Surgery:  Sleeve Gastrectomy    Surgeon: CINTHYA    Impression     44 year old male with Body mass index is 41.24 kg/m . in the setting of morbid obesity which satisfies the NIH criteria for bariatric surgery. He is working towards surgery and at his 10/11/24 intake visit was attempting to quit nicotine.  Today, he reports nicotine use is done.     Zepbound started at our intro visit for dual benefit on morbid obesity and new onset type II diabetes as reflected below. He's ramped to 5mg/week.  In follow up today he's weight neutral at the 5mg/week dose. We'll plan to ramp up to the 7.5mg/week dose of tirzepatide and then 10mg/week if tolerated.   Lab Results   Component Value Date    A1C 6.9 10/11/2024    A1C 6.3 04/16/2024    A1C 6.0 05/23/2023    A1C 5.9 06/23/2022    A1C 5.9 09/23/2021     His weight is stable. .     He's met with dietician and bariatric psychology and is progressing towards clearances.     Comorbidities:  Patient Active Problem List   Diagnosis    ADHD (attention deficit hyperactivity disorder)    Hypertension    Hypertriglyceridemia    LINDA (obstructive sleep apnea)    Prediabetes    Pulmonary nodule    Morbid obesity (H)    Depression, unspecified depression type       Plan   You can check labs in late January/early February to make sure all is on track with diabetes control and vitamin levels as we get ready for surgery later this winter.   Great work getting off nicotine.  Increase Tirzepatide to 7.5mg/week for 4 weeks then increase to 10mg/week if tolerated. If too strong, we'll go back down in dose to the 5mg/week dose. Anticipate stopping 10-14 days prior to surgery.   Continue clearance with psychology and dietician.   Past Surgical History        Past Surgical History:   Procedure Laterality Date    OTHER SURGICAL HISTORY      nose        Family History,  Social History   Reviewed as documented. See time and date confirmation.    Interim History/Pre-op needs list    Initial Labs Complete and Reviewed: will be done at next lab draw  Dietitian Visits Initiated/cleared: started  Weight Change since initial weight: neutral  Psychologist visits initiated/cleared: started  HCM UTD:    Pap: n/a   Mammogram: n/a   Colonscopy:  n/a   Other: NA  Sugars Well Controlled: yes. A1c recheck due in 6 weeks.  Cardiac: n/a  Pulmonary: n/a  Hormone use: no  Birth control n/a.  Other: .  Medications and Allergies      Current Outpatient Medications   Medication Sig Dispense Refill    amLODIPine (NORVASC) 5 MG tablet Take 1 tablet (5 mg) by mouth daily 90 tablet 3    amphetamine-dextroamphetamine (ADDERALL XR) 15 MG 24 hr capsule Take 15 mg by mouth.      losartan (COZAAR) 100 MG tablet Take 1 tablet (100 mg) by mouth daily 90 tablet 3    lurasidone (LATUDA) 20 MG TABS tablet Take 1 tablet by mouth daily at 2 pm.      metFORMIN (GLUCOPHAGE) 500 MG tablet TAKE 2 TABLETS BY MOUTH ONCE DAILY AFTER A MEAL*      tirzepatide-Weight Management (ZEPBOUND) 5 MG/0.5ML prefilled pen Inject 0.5 mLs (5 mg) subcutaneously every 7 days. 2 mL 3    furosemide (LASIX) 20 MG tablet Take 1 tablet (20 mg) by mouth daily (Patient not taking: Reported on 12/18/2024) 90 tablet 1     Allergies: Sulfa antibiotics    Habits   Tobacco Use: Oct 18th quit date  Car projects for fun.  Working out at work.   Dietary History   Reviewed proper bariatric method again today.  The patient has been working with our bariatric dieticians and has upcoming/ongoing visits for his 6 mo SWL requirements/training.    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    Review of Systems     GENERAL/CONSTITUTIONAL:  Fatigue: no  HEENT:  Dental Pain:  "no  Trouble Swallowing: no  CARDIOVASCULAR:  Chest pain with exertion: no  Syncope: no.  PULMONARY:  CPAP Use: no  Asthma Controlled: yes  GASTROINTESTINAL:  GERD/Heartburn: no  Gallbladder: no issues/intact  UROLOGIC:  Urinary Symptoms: no  NEUROLOGIC:  Headaches: no  Paresthesias: no  PSYCHIATRIC:  Moods: good  MUSCULOSKELETAL/RHEUMATOLOGIC  Arthralgias: no  Myalgias: had back strain 2 months ago and responsed to steroid pack  ENDOCRINE:  Monitoring Blood Sugars: rare  Sugars Well Controlled: yes, due for recheck in 6 weeks.  DERMATOLOGIC:  Rashes: no  Physical Exam   Vitals: /70 (BP Location: Right arm)   Ht 1.894 m (6' 2.55\")   Wt 147.9 kg (326 lb)   BMI 41.24 kg/m    Body mass index is 41.24 kg/m .  GENERAL: appears his stated age. Ambulation is independent.  NECK:thick.  HEART: Rhythm regular, rate regular, no murmur   LUNGS: Clear   ABDOMEN: soft, non-tender, obese,no rashes, surgical scars absent. No guarding.  MUSCULOSKELETAL: no obvious deformities  VASCULAR: palpable peripheral pulses, no  lower extremity edema, yes color changes of venous stasis,  ulcerations  SKIN: Rashes:     Counseling     We discussed the National Weight Control Registry and Healthy Weight Maintenance Strategies.   We discussed ways to optimize his metabolism.  We discussed specific exercise goals and dietary habits conducive to a healthy weight.  We discussed the importance of lifelong vitamin supplementation and the potential medical complications of vitamin non-compliance.  We discussed the importance of restorative sleep and stress management in maintaining a healthy weight.  I reminded him to avoid alcohol for 1 year post-operatively, to avoid NSAIDS for life unless specifically indicated and used with a concomitant PPI, to avoid caffeine in excess, and explained the importance of lifelong tobacco abstinence.  Medical Decision Makin minutes spent by me on the date of the encounter doing chart review, history " and exam, documentation and further activities per the note      Franky Hood MD  [facility]  12/18/2024  7:37 AM

## 2024-12-19 LAB
ALBUMIN SERPL BCG-MCNC: 4.3 G/DL (ref 3.5–5.2)
ALP SERPL-CCNC: 53 U/L (ref 40–150)
ALT SERPL W P-5'-P-CCNC: 62 U/L (ref 0–70)
ANION GAP SERPL CALCULATED.3IONS-SCNC: 9 MMOL/L (ref 7–15)
AST SERPL W P-5'-P-CCNC: 29 U/L (ref 0–45)
BILIRUB SERPL-MCNC: 0.6 MG/DL
BUN SERPL-MCNC: 13.4 MG/DL (ref 6–20)
CALCIUM SERPL-MCNC: 9.1 MG/DL (ref 8.8–10.4)
CHLORIDE SERPL-SCNC: 103 MMOL/L (ref 98–107)
CREAT SERPL-MCNC: 0.96 MG/DL (ref 0.67–1.17)
EGFRCR SERPLBLD CKD-EPI 2021: >90 ML/MIN/1.73M2
FERRITIN SERPL-MCNC: 636 NG/ML (ref 31–409)
GLUCOSE SERPL-MCNC: 110 MG/DL (ref 70–99)
HCO3 SERPL-SCNC: 27 MMOL/L (ref 22–29)
POTASSIUM SERPL-SCNC: 4.1 MMOL/L (ref 3.4–5.3)
PROT SERPL-MCNC: 6.8 G/DL (ref 6.4–8.3)
SODIUM SERPL-SCNC: 139 MMOL/L (ref 135–145)
TSH SERPL DL<=0.005 MIU/L-ACNC: 1.22 UIU/ML (ref 0.3–4.2)
VIT B12 SERPL-MCNC: 528 PG/ML (ref 232–1245)
VIT D+METAB SERPL-MCNC: 15 NG/ML (ref 20–50)

## 2024-12-21 LAB
ANNOTATION COMMENT IMP: NORMAL
RETINYL PALMITATE SERPL-MCNC: <0.02 MG/L
VIT A SERPL-MCNC: 0.52 MG/L
VIT B1 PYROPHOSHATE BLD-SCNC: 153 NMOL/L

## 2025-01-05 ENCOUNTER — MYC MEDICAL ADVICE (OUTPATIENT)
Dept: SURGERY | Facility: CLINIC | Age: 45
End: 2025-01-05
Payer: COMMERCIAL

## 2025-01-06 ENCOUNTER — VIRTUAL VISIT (OUTPATIENT)
Dept: SURGERY | Facility: CLINIC | Age: 45
End: 2025-01-06
Payer: COMMERCIAL

## 2025-01-06 VITALS — HEIGHT: 75 IN | WEIGHT: 315 LBS | BODY MASS INDEX: 39.17 KG/M2

## 2025-01-06 DIAGNOSIS — E66.01 CLASS 3 SEVERE OBESITY DUE TO EXCESS CALORIES WITH SERIOUS COMORBIDITY AND BODY MASS INDEX (BMI) OF 40.0 TO 44.9 IN ADULT (H): ICD-10-CM

## 2025-01-06 DIAGNOSIS — E66.813 CLASS 3 SEVERE OBESITY DUE TO EXCESS CALORIES WITH SERIOUS COMORBIDITY AND BODY MASS INDEX (BMI) OF 40.0 TO 44.9 IN ADULT (H): ICD-10-CM

## 2025-01-06 DIAGNOSIS — E11.9 TYPE 2 DIABETES MELLITUS WITHOUT COMPLICATION, WITHOUT LONG-TERM CURRENT USE OF INSULIN (H): Primary | ICD-10-CM

## 2025-01-06 DIAGNOSIS — E66.01 MORBID OBESITY (H): Primary | ICD-10-CM

## 2025-01-06 PROCEDURE — 97803 MED NUTRITION INDIV SUBSEQ: CPT | Mod: 95 | Performed by: DIETITIAN, REGISTERED

## 2025-01-06 NOTE — PROGRESS NOTES
Virtual Visit Details    Type of service:  Video Visit     Originating Location (pt. Location): Home    Distant Location (provider location):  Off-site  Platform used for Video Visit: OnBeep    Start Time: 12:20 PM  End Time: 12:58 PM    Michael has made some changes in eating and lifestyle, but still needs to be more mindful about his eating. He will follow up with a list of activities and mindful eating strategies. He also needs to send the packet of questionnaires. F31.9; F43.9; E66.01

## 2025-01-06 NOTE — PROGRESS NOTES
"Michael Cunningham is a 44 year old who is being evaluated via a billable video visit.        How would you like to obtain your AVS? MyChart  If the video visit is dropped, the invitation should be resent by: Send to e-mail at: [email or web address]  Will anyone else be joining your video visit? No        Follow Up Surgical Weight Loss Supervised Diet Evaluation    Assessment:  Pt. is being seen today for a follow up RD nutritional evaluation. Pt. has been unsuccessful with non-surgical weight loss methods and is interested in bariatric surgery. Today we reviewed current eating habits and level of physical activity, and instructed on the changes that are required for successful bariatric outcomes.    Surgery of interest per pt: LSG.    Workflow review:  Support Group: Not completed.  Psychology:In progress.  Lab work:Completed.  SWL:yes 3/6      Weight goal: At or below initial.    Anthropometrics:  Pt's weight is 320 lbs 0 oz  Initial weight: 326lb  Weight change: down 6lb  BMI: Body mass index is 40.48 kg/m .   Ideal body weight: 83.5 kg (184 lb 0.1 oz)  Adjusted ideal body weight: 109.2 kg (240 lb 12.9 oz)    Medical History:  Patient Active Problem List   Diagnosis    ADHD (attention deficit hyperactivity disorder)    Hypertension    Hypertriglyceridemia    LINDA (obstructive sleep apnea)    Prediabetes    Pulmonary nodule    Morbid obesity (H)    Depression, unspecified depression type    Diabetes mellitus, type 2 (H)      Diabetes: yes  HbA1c:  No results found for: \"HGBA1C\"    Progress over past month: feeling really good and is excited for surgery  -taking zepbound, hoping to increase dose soon    Diet Recall/Time  Breakfast: 2 hard boiled eggs  Lunch: 1/2 rotisserie chicken  Dinner: out  -not really getting any veggies throughout the day    Typical Snacks: none    Overnight eating: No    Meal duration: 15-20 minutes.      fluids by 30 minutes before, during meal, and waiting 30 minutes after meal " before drinking fluids: yes    Beverages  Still drinking soda- working on decreasing this  2-3 cups water per day    Exercise  Has been trying to workout an hour per day- has access to gym at work  -treadmill and stair climber    PES statement:   Morbid obesity related to excessive energy intake as evidenced by Intake of high caloric density foods/beverages (juice, soda, alcohol) at meals and/or snacks; large portions; frequent grazing; Estimated intake that exceeds estimated daily energy intake; Binge eating patterns; Frequent excessive fast food or restaurant intake; and BMI 40.48       Intervention    Nutrition Education:   Provided general overview of diet and lifestyle modifications needed to be a deemed a safe candidate for bariatric surgery.   Educated patient on how to read a food label: choosing foods with than 10 grams fat and 10 grams sugar per serving to avoid dumping syndrome.  Food/Nutrient Delivery:  Educated patient on eating three meals, with cutting out snacking.  Bariatric Plate: Patient and I discussed the importance of including a lean protein source (20-30 grams/meal), vegetables (included at lunch and dinner), one serving (15g) of carbohydrate, and limited added fat (1 tb/day) at each meal.   Educated patient on using a protein powder drink as a meal replacement and/or supplement after bariatric surgery.   Discussed importance of adequate hydration after surgery, with goal of at least 64 oz of fluids/day.  Addressed avoiding all carbonated, caffeinated and sweetened drinks to prepare for bariatric surgery.     Nutrition Counseling:  Mindful eating techniques: Encouraged slow meal pace, chewing foods to applesauce consistency for 20-30 minutes/meal.   Discussed  fluids 30 minutes before, during, and after meal to prevent dumping syndrome and discomfort post bariatric surgery.       Instructions/Goals:     Continue implementing bariatric surgery lifestyle modifications.  Reduce soda  intake    Handouts Provided:   10-10 rule   Fiber     Monitor/Evaluation:  Pt. s target weight:  no gain from initial visit, pt. verbalized understanding.     Plan for next visit:   Bariatric plate.        Video-Visit Details    Type of service:  Video Visit    Video Start Time (time video started): 8:50a    Video End Time (time video stopped): 9:15a    Originating Location (pt. Location): Home      Distant Location (provider location):  Off-site    Mode of Communication:  Video Conference via [facility]    Physician has received verbal consent for a Video Visit from the patient? Yes      Marie Floyd RD

## 2025-01-06 NOTE — LETTER
"1/6/2025      Michael Cunningham  [address] [place] [address]      Dear Colleague,    Thank you for referring your patient, Michael Cunningham, to the [facility] SURGERY CL[facility]IC [facility] BARIATRICS CARE [place]. Please see a copy of my visit note below.    Michael Cunningham is a 44 year old who is being evaluated via a billable video visit.        How would you like to obtain your AVS? MyChart  If the video visit is dropped, the invitation should be resent by: Send to e-mail at: [email or web address]  Will anyone else be joining your video visit? No        Follow Up Surgical Weight Loss Supervised Diet Evaluation    Assessment:  Pt. is being seen today for a follow up RD nutritional evaluation. Pt. has been unsuccessful with non-surgical weight loss methods and is interested in bariatric surgery. Today we reviewed current eating habits and level of physical activity, and instructed on the changes that are required for successful bariatric outcomes.    Surgery of interest per pt: LSG.    Workflow review:  Support Group: Not completed.  Psychology:In progress.  Lab work:Completed.  SWL:yes 3/6      Weight goal: At or below initial.    Anthropometrics:  Pt's weight is 320 lbs 0 oz  Initial weight: 326lb  Weight change: down 6lb  BMI: Body mass index is 40.48 kg/m .   Ideal body weight: 83.5 kg (184 lb 0.1 oz)  Adjusted ideal body weight: 109.2 kg (240 lb 12.9 oz)    Medical History:  Patient Active Problem List   Diagnosis     ADHD (attention deficit hyperactivity disorder)     Hypertension     Hypertriglyceridemia     LINDA (obstructive sleep apnea)     Prediabetes     Pulmonary nodule     Morbid obesity (H)     Depression, unspecified depression type     Diabetes mellitus, type 2 (H)      Diabetes: yes  HbA1c:  No results found for: \"HGBA1C\"    Progress over past month: feeling really good and is excited for surgery  -taking zepbound, hoping to increase dose soon    Diet Recall/Time  Breakfast: 2 hard boiled " eggs  Lunch: 1/2 rotisserie chicken  Dinner: out  -not really getting any veggies throughout the day    Typical Snacks: none    Overnight eating: No    Meal duration: 15-20 minutes.      fluids by 30 minutes before, during meal, and waiting 30 minutes after meal before drinking fluids: yes    Beverages  Still drinking soda- working on decreasing this  2-3 cups water per day    Exercise  Has been trying to workout an hour per day- has access to gym at work  -treadmill and stair climber    PES statement:   Morbid obesity related to excessive energy intake as evidenced by Intake of high caloric density foods/beverages (juice, soda, alcohol) at meals and/or snacks; large portions; frequent grazing; Estimated intake that exceeds estimated daily energy intake; Binge eating patterns; Frequent excessive fast food or restaurant intake; and BMI 40.48       Intervention    Nutrition Education:   Provided general overview of diet and lifestyle modifications needed to be a deemed a safe candidate for bariatric surgery.   Educated patient on how to read a food label: choosing foods with than 10 grams fat and 10 grams sugar per serving to avoid dumping syndrome.  Food/Nutrient Delivery:  Educated patient on eating three meals, with cutting out snacking.  Bariatric Plate: Patient and I discussed the importance of including a lean protein source (20-30 grams/meal), vegetables (included at lunch and dinner), one serving (15g) of carbohydrate, and limited added fat (1 tb/day) at each meal.   Educated patient on using a protein powder drink as a meal replacement and/or supplement after bariatric surgery.   Discussed importance of adequate hydration after surgery, with goal of at least 64 oz of fluids/day.  Addressed avoiding all carbonated, caffeinated and sweetened drinks to prepare for bariatric surgery.     Nutrition Counseling:  Mindful eating techniques: Encouraged slow meal pace, chewing foods to applesauce consistency  for 20-30 minutes/meal.   Discussed  fluids 30 minutes before, during, and after meal to prevent dumping syndrome and discomfort post bariatric surgery.       Instructions/Goals:     Continue implementing bariatric surgery lifestyle modifications.  Reduce soda intake    Handouts Provided:   10-10 rule   Fiber     Monitor/Evaluation:  Pt. s target weight:  no gain from initial visit, pt. verbalized understanding.     Plan for next visit:   Bariatric plate.        Video-Visit Details    Type of service:  Video Visit    Video Start Time (time video started): 8:50a    Video End Time (time video stopped): 9:15a    Originating Location (pt. Location): Home      Distant Location (provider location):  Off-site    Mode of Communication:  Video Conference via [facility]    Physician has received verbal consent for a Video Visit from the patient? Yes      Marie Floyd RD            Again, thank you for allowing me to participate in the care of your patient.        Sincerely,        Marie Floyd RD    Electronically signed

## 2025-01-22 ENCOUNTER — VIRTUAL VISIT (OUTPATIENT)
Dept: SURGERY | Facility: CLINIC | Age: 45
End: 2025-01-22
Payer: COMMERCIAL

## 2025-01-22 DIAGNOSIS — E66.01 MORBID OBESITY (H): Primary | ICD-10-CM

## 2025-01-22 NOTE — PROGRESS NOTES
Virtual Visit Details    Type of service:  Video Visit     Originating Location (pt. Location): Home    Distant Location (provider location):  Off-site  Platform used for Video Visit: Zoom (Telehealth)    Start Time: 8:28 AM  End Time: 8:57 AM    Michael has made quality changes in eating and lifestyle and appears more mindful about his eating. He is now ready to proceed with surgery. A report was sent to the clinic. F31.9; F43.10; F90.9; E66.01

## 2025-02-17 ENCOUNTER — VIRTUAL VISIT (OUTPATIENT)
Dept: SURGERY | Facility: CLINIC | Age: 45
End: 2025-02-17
Payer: COMMERCIAL

## 2025-02-17 VITALS — WEIGHT: 315 LBS | HEIGHT: 75 IN | BODY MASS INDEX: 39.17 KG/M2

## 2025-02-17 DIAGNOSIS — E66.813 CLASS 3 SEVERE OBESITY DUE TO EXCESS CALORIES WITH SERIOUS COMORBIDITY AND BODY MASS INDEX (BMI) OF 40.0 TO 44.9 IN ADULT (H): ICD-10-CM

## 2025-02-17 DIAGNOSIS — E11.9 TYPE 2 DIABETES MELLITUS WITHOUT COMPLICATION, WITHOUT LONG-TERM CURRENT USE OF INSULIN (H): Primary | ICD-10-CM

## 2025-02-17 DIAGNOSIS — E66.01 CLASS 3 SEVERE OBESITY DUE TO EXCESS CALORIES WITH SERIOUS COMORBIDITY AND BODY MASS INDEX (BMI) OF 40.0 TO 44.9 IN ADULT (H): ICD-10-CM

## 2025-02-17 PROCEDURE — 97803 MED NUTRITION INDIV SUBSEQ: CPT | Mod: 95 | Performed by: DIETITIAN, REGISTERED

## 2025-02-17 NOTE — PROGRESS NOTES
"Michael Cunningham is a 44 year old who is being evaluated via a billable video visit.      How would you like to obtain your AVS? MyChart  If the video visit is dropped, the invitation should be resent by: Send to e-mail at: [email or web address]  Will anyone else be joining your video visit? No        Follow Up Surgical Weight Loss Supervised Diet Evaluation    Assessment:  Pt. is being seen today for a follow up RD nutritional evaluation. Pt. has been unsuccessful with non-surgical weight loss methods and is interested in bariatric surgery. Today we reviewed current eating habits and level of physical activity, and instructed on the changes that are required for successful bariatric outcomes.    Surgery of interest per pt: LSG.    Workflow review:  Support Group: Not completed.  Psychology:Completed.  Lab work:Completed.  SWL:4/6      Weight goal: At or below initial.    Anthropometrics:  Pt's weight is 320 lbs 0 oz  Initial weight: 326lb  Weight change: down 6lb  BMI: Body mass index is 40.48 kg/m .   Ideal body weight: 83.5 kg (184 lb 0.1 oz)  Adjusted ideal body weight: 108.1 kg (238 lb 6.5 oz)    Medical History:  Patient Active Problem List   Diagnosis    ADHD (attention deficit hyperactivity disorder)    Hypertension    Hypertriglyceridemia    LINDA (obstructive sleep apnea)    Prediabetes    Pulmonary nodule    Morbid obesity (H)    Depression, unspecified depression type    Diabetes mellitus, type 2 (H)      Diabetes: yes  HbA1c:  No results found for: \"HGBA1C\"    Progress over past month: things are still going great, higher protein is going well  -protein drinks    Diet Recall/Time  Breakfast: smoked salmon  Lunch: protein shake   Dinner: steak  -veggies are going ok, bought a few steamer bags  -working on staying away from       Meal duration: 20 minutes, still working on slowing down more than that      fluids by 30 minutes before, during meal, and waiting 30 minutes after meal before " drinking fluids: yes    Beverages  Water intake is good, really cutting down on soda- plan to eliminate by surgery    Exercise  No change    PES statement:     Obesity related to excessive energy intake as evidenced by Intake of high caloric density foods/beverages (juice, soda, alcohol) at meals and/or snacks; large portions; frequent grazing; Estimated intake that exceeds estimated daily energy intake; Binge eating patterns; Frequent excessive fast food or restaurant intake; and BMI 40.48     Intervention    Nutrition Education:   Provided general overview of diet and lifestyle modifications needed to be a deemed a safe candidate for bariatric surgery.     Food/Nutrient Delivery:  Educated patient on eating three meals, with cutting out snacking.  Bariatric Plate: Patient and I discussed the importance of including a lean protein source (20-30 grams/meal), vegetables (included at lunch and dinner), one serving (15g) of carbohydrate, and limited added fat (1 tb/day) at each meal.   Educated patient on using a protein powder drink as a meal replacement and/or supplement after bariatric surgery.   Discussed importance of adequate hydration after surgery, with goal of at least 64 oz of fluids/day.  Addressed avoiding all carbonated, caffeinated and sweetened drinks to prepare for bariatric surgery.     Nutrition Counseling:  Mindful eating techniques: Encouraged slow meal pace, chewing foods to applesauce consistency for 20-30 minutes/meal.   Discussed  fluids 30 minutes before, during, and after meal to prevent dumping syndrome and discomfort post bariatric surgery.     Instructions/Goals:     Continue implementing bariatric surgery lifestyle modifications.      Monitor/Evaluation:  Pt. s target weight: no gain from initial visit, pt. verbalized understanding.       Plan for next visit:     Educate on dumping syndrome and reading food labels.      Video-Visit Details    Type of service:  Video  Visit    Video Start Time (time video started): 9:25a    Video End Time (time video stopped): 9:40a    Originating Location (pt. Location): Home      Distant Location (provider location):  Off-site    Mode of Communication:  Video Conference via [facility]    Physician has received verbal consent for a Video Visit from the patient? Yes      Marie Floyd RD

## 2025-02-17 NOTE — LETTER
"2/17/2025      Michael Cunningham  [address] [place] [address]      Dear Colleague,    Thank you for referring your patient, Michael Cunningham, to the [facility] SURGERY [facility] BAR[facility]ATRICS CARE [place]. Please see a copy of my visit note below.    Michael Cunningham is a 44 year old who is being evaluated via a billable video visit.      How would you like to obtain your AVS? MyChart  If the video visit is dropped, the invitation should be resent by: Send to e-mail at: [email or web address]  Will anyone else be joining your video visit? No        Follow Up Surgical Weight Loss Supervised Diet Evaluation    Assessment:  Pt. is being seen today for a follow up RD nutritional evaluation. Pt. has been unsuccessful with non-surgical weight loss methods and is interested in bariatric surgery. Today we reviewed current eating habits and level of physical activity, and instructed on the changes that are required for successful bariatric outcomes.    Surgery of interest per pt: LSG.    Workflow review:  Support Group: Not completed.  Psychology:Completed.  Lab work:Completed.  SWL:4/6      Weight goal: At or below initial.    Anthropometrics:  Pt's weight is 320 lbs 0 oz  Initial weight: 326lb  Weight change: down 6lb  BMI: Body mass index is 40.48 kg/m .   Ideal body weight: 83.5 kg (184 lb 0.1 oz)  Adjusted ideal body weight: 108.1 kg (238 lb 6.5 oz)    Medical History:  Patient Active Problem List   Diagnosis     ADHD (attention deficit hyperactivity disorder)     Hypertension     Hypertriglyceridemia     LINDA (obstructive sleep apnea)     Prediabetes     Pulmonary nodule     Morbid obesity (H)     Depression, unspecified depression type     Diabetes mellitus, type 2 (H)      Diabetes: yes  HbA1c:  No results found for: \"HGBA1C\"    Progress over past month: things are still going great, higher protein is going well  -protein drinks    Diet Recall/Time  Breakfast: smoked salmon  Lunch: protein shake "   Dinner: steak  -veggies are going ok, bought a few steamer bags  -working on staying away from       Meal duration: 20 minutes, still working on slowing down more than that      fluids by 30 minutes before, during meal, and waiting 30 minutes after meal before drinking fluids: yes    Beverages  Water intake is good, really cutting down on soda- plan to eliminate by surgery    Exercise  No change    PES statement:     Obesity related to excessive energy intake as evidenced by Intake of high caloric density foods/beverages (juice, soda, alcohol) at meals and/or snacks; large portions; frequent grazing; Estimated intake that exceeds estimated daily energy intake; Binge eating patterns; Frequent excessive fast food or restaurant intake; and BMI 40.48     Intervention    Nutrition Education:   Provided general overview of diet and lifestyle modifications needed to be a deemed a safe candidate for bariatric surgery.     Food/Nutrient Delivery:  Educated patient on eating three meals, with cutting out snacking.  Bariatric Plate: Patient and I discussed the importance of including a lean protein source (20-30 grams/meal), vegetables (included at lunch and dinner), one serving (15g) of carbohydrate, and limited added fat (1 tb/day) at each meal.   Educated patient on using a protein powder drink as a meal replacement and/or supplement after bariatric surgery.   Discussed importance of adequate hydration after surgery, with goal of at least 64 oz of fluids/day.  Addressed avoiding all carbonated, caffeinated and sweetened drinks to prepare for bariatric surgery.     Nutrition Counseling:  Mindful eating techniques: Encouraged slow meal pace, chewing foods to applesauce consistency for 20-30 minutes/meal.   Discussed  fluids 30 minutes before, during, and after meal to prevent dumping syndrome and discomfort post bariatric surgery.     Instructions/Goals:     Continue implementing bariatric surgery  lifestyle modifications.      Monitor/Evaluation:  Pt. s target weight: no gain from initial visit, pt. verbalized understanding.       Plan for next visit:     Educate on dumping syndrome and reading food labels.      Video-Visit Details    Type of service:  Video Visit    Video Start Time (time video started): 9:25a    Video End Time (time video stopped): 9:40a    Originating Location (pt. Location): Home      Distant Location (provider location):  Off-site    Mode of Communication:  Video Conference via [facility]    Physician has received verbal consent for a Video Visit from the patient? Yes      Marie Floyd RD            Again, thank you for allowing me to participate in the care of your patient.        Sincerely,        Marie Floyd RD    Electronically signed

## 2025-03-11 ENCOUNTER — VIRTUAL VISIT (OUTPATIENT)
Dept: SURGERY | Facility: CLINIC | Age: 45
End: 2025-03-11
Payer: COMMERCIAL

## 2025-03-11 VITALS — WEIGHT: 315 LBS | BODY MASS INDEX: 39.17 KG/M2 | HEIGHT: 75 IN

## 2025-03-11 DIAGNOSIS — E66.813 CLASS 3 SEVERE OBESITY DUE TO EXCESS CALORIES WITH SERIOUS COMORBIDITY AND BODY MASS INDEX (BMI) OF 40.0 TO 44.9 IN ADULT (H): ICD-10-CM

## 2025-03-11 DIAGNOSIS — E11.9 TYPE 2 DIABETES MELLITUS WITHOUT COMPLICATION, WITHOUT LONG-TERM CURRENT USE OF INSULIN (H): Primary | ICD-10-CM

## 2025-03-11 DIAGNOSIS — E66.01 CLASS 3 SEVERE OBESITY DUE TO EXCESS CALORIES WITH SERIOUS COMORBIDITY AND BODY MASS INDEX (BMI) OF 40.0 TO 44.9 IN ADULT (H): ICD-10-CM

## 2025-03-11 PROCEDURE — 98005 SYNCH AUDIO-VIDEO EST LOW 20: CPT | Performed by: DIETITIAN, REGISTERED

## 2025-03-11 NOTE — PROGRESS NOTES
"Michael Cunningham is a 44 year old who is being evaluated via a billable video visit.      How would you like to obtain your AVS? MyChart  If the video visit is dropped, the invitation should be resent by: Send to e-mail at: [email or web address]  Will anyone else be joining your video visit? No          Follow Up Surgical Weight Loss Supervised Diet Evaluation    Assessment:  Pt. is being seen today for a follow up RD nutritional evaluation. Pt. has been unsuccessful with non-surgical weight loss methods and is interested in bariatric surgery. Today we reviewed current eating habits and level of physical activity, and instructed on the changes that are required for successful bariatric outcomes.    Surgery of interest per pt: LSG.    Workflow review:  Support Group: Completed.  Psychology:Completed.  Lab work:Completed.  SWL:5/6      Weight goal: At or below initial.    Anthropometrics:  Pt's weight is 318 lbs 0 oz  Initial weight: 236lb  Weight change: down 8lb  BMI: Body mass index is 40.23 kg/m .   Ideal body weight: 83.5 kg (184 lb 0.1 oz)  Adjusted ideal body weight: 108.1 kg (238 lb 6.5 oz)    Medical History:  Patient Active Problem List   Diagnosis    ADHD (attention deficit hyperactivity disorder)    Hypertension    Hypertriglyceridemia    LINDA (obstructive sleep apnea)    Prediabetes    Pulmonary nodule    Morbid obesity (H)    Depression, unspecified depression type    Diabetes mellitus, type 2 (H)      Diabetes: yes  HbA1c:  No results found for: \"HGBA1C\"    Progress over past month: has been working on eliminating soda  -focusing on increasing protein, veggies    Diet Recall/Time  Breakfast: 2 protein shakes  Lunch: 1 protein shake, 2-3 hard boiled eggs  Dinner: chinese food      Meal duration: 30 minutes.      fluids by 30 minutes before, during meal, and waiting 30 minutes after meal before drinking fluids: yes      PES statement:   Morbid obesity related to excessive energy intake as " evidenced by Intake of high caloric density foods/beverages (juice, soda, alcohol) at meals and/or snacks; large portions; frequent grazing; Estimated intake that exceeds estimated daily energy intake; Binge eating patterns; Frequent excessive fast food or restaurant intake; and BMI 40.23     Intervention    Nutrition Education:   Provided general overview of diet and lifestyle modifications needed to be a deemed a safe candidate for bariatric surgery.   Educated patient on how to read a food label: choosing foods with than 10 grams fat and 10 grams sugar per serving to avoid dumping syndrome.    Food/Nutrient Delivery:  Educated patient on eating three meals, with cutting out snacking.  Bariatric Plate: Patient and I discussed the importance of including a lean protein source (20-30 grams/meal), vegetables (included at lunch and dinner), one serving (15g) of carbohydrate, and limited added fat (1 tb/day) at each meal.   Educated patient on how to complete a food journal and benefits of meal planning.   Educated patient on using a protein powder drink as a meal replacement and/or supplement after bariatric surgery.   Discussed importance of adequate hydration after surgery, with goal of at least 64 oz of fluids/day.  Addressed avoiding all carbonated, caffeinated and sweetened drinks to prepare for bariatric surgery.     Nutrition Counseling:  Mindful eating techniques: Encouraged slow meal pace, chewing foods to applesauce consistency for 20-30 minutes/meal.   Discussed  fluids 30 minutes before, during, and after meal to prevent dumping syndrome and discomfort post bariatric surgery.       Instructions/Goals:     Continue implementing bariatric surgery lifestyle modifications.      Handouts Provided:   Label reading and carbohydrates    Monitor/Evaluation:  Pt. s target weight:  no gain from initial visit, pt. verbalized understanding.         Plan for next visit:     (Final supervised diet visit with  AYDEE) pre/post-op  diet progression, give review of surgery process.      Video-Visit Details    Type of service:  Video Visit    Video Start Time (time video started): 9:00a    Video End Time (time video stopped): 9:16a    Originating Location (pt. Location): Home      Distant Location (provider location):  Off-site    Mode of Communication:  Video Conference via [facility]    Physician has received verbal consent for a Video Visit from the patient? Yes      Marie Floyd RD

## 2025-03-11 NOTE — LETTER
"3/11/2025      Michael Cunningham  [address] [place] [address]      Dear Colleague,    Thank you for referring your patient, Michael Cunningham, to the [facility] SURGERY CL[facility] BAR[facility]ATRICS CARE [place]. Please see a copy of my visit note below.    Michael Cunningham is a 44 year old who is being evaluated via a billable video visit.      How would you like to obtain your AVS? MyChart  If the video visit is dropped, the invitation should be resent by: Send to e-mail at: [email or web address]  Will anyone else be joining your video visit? No          Follow Up Surgical Weight Loss Supervised Diet Evaluation    Assessment:  Pt. is being seen today for a follow up RD nutritional evaluation. Pt. has been unsuccessful with non-surgical weight loss methods and is interested in bariatric surgery. Today we reviewed current eating habits and level of physical activity, and instructed on the changes that are required for successful bariatric outcomes.    Surgery of interest per pt: LSG.    Workflow review:  Support Group: Completed.  Psychology:Completed.  Lab work:Completed.  SWL:5/6      Weight goal: At or below initial.    Anthropometrics:  Pt's weight is 318 lbs 0 oz  Initial weight: 236lb  Weight change: down 8lb  BMI: Body mass index is 40.23 kg/m .   Ideal body weight: 83.5 kg (184 lb 0.1 oz)  Adjusted ideal body weight: 108.1 kg (238 lb 6.5 oz)    Medical History:  Patient Active Problem List   Diagnosis     ADHD (attention deficit hyperactivity disorder)     Hypertension     Hypertriglyceridemia     LINDA (obstructive sleep apnea)     Prediabetes     Pulmonary nodule     Morbid obesity (H)     Depression, unspecified depression type     Diabetes mellitus, type 2 (H)      Diabetes: yes  HbA1c:  No results found for: \"HGBA1C\"    Progress over past month: has been working on eliminating soda  -focusing on increasing protein, veggies    Diet Recall/Time  Breakfast: 2 protein shakes  Lunch: 1 protein shake, " 2-3 hard boiled eggs  Dinner: chinese food      Meal duration: 30 minutes.      fluids by 30 minutes before, during meal, and waiting 30 minutes after meal before drinking fluids: yes      PES statement:   Morbid obesity related to excessive energy intake as evidenced by Intake of high caloric density foods/beverages (juice, soda, alcohol) at meals and/or snacks; large portions; frequent grazing; Estimated intake that exceeds estimated daily energy intake; Binge eating patterns; Frequent excessive fast food or restaurant intake; and BMI 40.23     Intervention    Nutrition Education:   Provided general overview of diet and lifestyle modifications needed to be a deemed a safe candidate for bariatric surgery.   Educated patient on how to read a food label: choosing foods with than 10 grams fat and 10 grams sugar per serving to avoid dumping syndrome.    Food/Nutrient Delivery:  Educated patient on eating three meals, with cutting out snacking.  Bariatric Plate: Patient and I discussed the importance of including a lean protein source (20-30 grams/meal), vegetables (included at lunch and dinner), one serving (15g) of carbohydrate, and limited added fat (1 tb/day) at each meal.   Educated patient on how to complete a food journal and benefits of meal planning.   Educated patient on using a protein powder drink as a meal replacement and/or supplement after bariatric surgery.   Discussed importance of adequate hydration after surgery, with goal of at least 64 oz of fluids/day.  Addressed avoiding all carbonated, caffeinated and sweetened drinks to prepare for bariatric surgery.     Nutrition Counseling:  Mindful eating techniques: Encouraged slow meal pace, chewing foods to applesauce consistency for 20-30 minutes/meal.   Discussed  fluids 30 minutes before, during, and after meal to prevent dumping syndrome and discomfort post bariatric surgery.       Instructions/Goals:     Continue implementing  bariatric surgery lifestyle modifications.      Handouts Provided:   Label reading and carbohydrates    Monitor/Evaluation:  Pt. s target weight:  no gain from initial visit, pt. verbalized understanding.         Plan for next visit:     (Final supervised diet visit with RD) pre/post-op  diet progression, give review of surgery process.      Video-Visit Details    Type of service:  Video Visit    Video Start Time (time video started): 9:00a    Video End Time (time video stopped): 9:16a    Originating Location (pt. Location): Home      Distant Location (provider location):  Off-site    Mode of Communication:  Video Conference via [facility]    Physician has received verbal consent for a Video Visit from the patient? Yes      Marie Floyd RD            Again, thank you for allowing me to participate in the care of your patient.        Sincerely,        Marie Floyd RD    Electronically signed

## 2025-03-17 ENCOUNTER — PATIENT OUTREACH (OUTPATIENT)
Dept: CARE COORDINATION | Facility: CLINIC | Age: 45
End: 2025-03-17
Payer: COMMERCIAL

## 2025-03-30 ENCOUNTER — HEALTH MAINTENANCE LETTER (OUTPATIENT)
Age: 45
End: 2025-03-30

## 2025-03-31 ENCOUNTER — PATIENT OUTREACH (OUTPATIENT)
Dept: CARE COORDINATION | Facility: CLINIC | Age: 45
End: 2025-03-31
Payer: COMMERCIAL

## 2025-04-21 ENCOUNTER — VIRTUAL VISIT (OUTPATIENT)
Dept: SURGERY | Facility: CLINIC | Age: 45
End: 2025-04-21
Payer: COMMERCIAL

## 2025-04-21 VITALS — HEIGHT: 74 IN | WEIGHT: 308 LBS | BODY MASS INDEX: 39.53 KG/M2

## 2025-04-21 DIAGNOSIS — E66.812 OBESITY, CLASS II, BMI 35-39.9, ISOLATED (SEE ACTUAL BMI): ICD-10-CM

## 2025-04-21 DIAGNOSIS — E11.9 TYPE 2 DIABETES MELLITUS WITHOUT COMPLICATION, WITHOUT LONG-TERM CURRENT USE OF INSULIN (H): Primary | ICD-10-CM

## 2025-04-21 DIAGNOSIS — Z71.3 NUTRITIONAL COUNSELING: ICD-10-CM

## 2025-04-21 PROCEDURE — 98005 SYNCH AUDIO-VIDEO EST LOW 20: CPT | Performed by: DIETITIAN, REGISTERED

## 2025-04-21 NOTE — LETTER
4/21/2025      Michael Cunningham  [address] [place] [address]      Dear Colleague,    Thank you for referring your patient, Michael Cunningham, to the [facility] SURGERY CLINIC AND BARIATRICS CARE [place]. Please see a copy of my visit note below.    Michael Cunningham is a 44 year old who is being evaluated via a billable video visit.      How would you like to obtain your AVS? MyChart  If the video visit is dropped, the invitation should be resent by: Send to e-mail at: [email or web address]  Will anyone else be joining your video visit? No          Follow Up Surgical Weight Loss Supervised Diet Evaluation    Assessment:  Pt. is being seen today for a follow up RD nutritional evaluation. Pt. has been unsuccessful with non-surgical weight loss methods and is interested in bariatric surgery. Today we reviewed current eating habits and level of physical activity, and instructed on the changes that are required for successful bariatric outcomes.    Surgery of interest per pt: LSG.    Workflow review:  Support Group: Completed.  Psychology:Completed.  Lab work:Completed.  SWL:6/6      Weight goal: At or below initial.    Anthropometrics:  Pt's weight is 308 lbs 0 oz  Initial weight: 336lb  Weight change: down 28lb  BMI: Body mass index is 39.54 kg/m .   Ideal body weight: 83.5 kg (184 lb 0.1 oz)  Adjusted ideal body weight: 107.8 kg (237 lb 9.7 oz)    Medical History:  Patient Active Problem List   Diagnosis     ADHD (attention deficit hyperactivity disorder)     Hypertension     Hypertriglyceridemia     LINDA (obstructive sleep apnea)     Prediabetes     Pulmonary nodule     Morbid obesity (H)     Depression, unspecified depression type     Diabetes mellitus, type 2 (H)      Diabetes: yes    Progress over past month: has been doing well, excited for surgery, consistent with all bariatric nutrition changes    Diet Recall/Time  Breakfast: protein shake  Lunch: protein shake with veggies and fruit  Dinner:  protein, starch, veggie    Meal duration: 15-20 minutes.      fluids by 30 minutes before, during meal, and waiting 30 minutes after meal before drinking fluids: yes    Beverages  Still working on eliminating soda      PES statement:     Obesity related to excessive energy intake as evidenced by Intake of high caloric density foods/beverages (juice, soda, alcohol) at meals and/or snacks; large portions; frequent grazing; Estimated intake that exceeds estimated daily energy intake; Binge eating patterns; Frequent excessive fast food or restaurant intake; and BMI 39.54     Intervention    Nutrition Education:   Provided general overview of diet and lifestyle modifications needed to be a deemed a safe candidate for bariatric surgery.   Vitamins: Educated on post-op vitamin regimen including MVI+ 18 mg Fe two times a day, calcium citrate 400-600 mg two times a day, 4662-8378 mcg sublingual B12 daily, 5000 IU vitamin D3 daily.     Food/Nutrient Delivery:  Educated patient on eating three meals, with cutting out snacking.  Bariatric Plate: Patient and I discussed the importance of including a lean protein source (20-30 grams/meal), vegetables (included at lunch and dinner), one serving (15g) of carbohydrate, and limited added fat (1 tb/day) at each meal.   Educated patient on how to complete a food journal and benefits of meal planning.   Educated patient on using a protein powder drink as a meal replacement and/or supplement after bariatric surgery.   Discussed importance of adequate hydration after surgery, with goal of at least 64 oz of fluids/day.  Addressed avoiding all carbonated, caffeinated and sweetened drinks to prepare for bariatric surgery.     Nutrition Counseling:  Mindful eating techniques: Encouraged slow meal pace, chewing foods to applesauce consistency for 20-30 minutes/meal.   Discussed  fluids 30 minutes before, during, and after meal to prevent dumping syndrome and discomfort post  bariatric surgery.   Discussed pre/post operative diet progression, post op vitamin regimen, gave review of surgery process.     Instructions/Goals:     Continue implementing bariatric surgery lifestyle modifications.    Handouts Provided:   Mark vitamins    Monitor/Evaluation:  Pt. s target weight:  no gain from initial visit, pt. verbalized understanding.     Pt has completed all nutrition requirements and is well-informed of the dietary and physical activity requirements that are necessary for successful bariatric outcomes. This pt is an appropriate candidate for surgery from a nutrition standpoint at this time. The patient understands that surgery is a tool, not a cure, and post operative follow up is essential.       Video-Visit Details    Type of service:  Video Visit    Video Start Time (time video started): 2:00p    Video End Time (time video stopped): 2:15p    Originating Location (pt. Location): Home      Distant Location (provider location):  Off-site    Mode of Communication:  Video Conference via [facility]    Physician has received verbal consent for a Video Visit from the patient? Yes      Marie Floyd RD            Again, thank you for allowing me to participate in the care of your patient.        Sincerely,        Marie Floyd RD    Electronically signed

## 2025-04-21 NOTE — PROGRESS NOTES
Michael Cunningham is a 44 year old who is being evaluated via a billable video visit.      How would you like to obtain your AVS? MyChart  If the video visit is dropped, the invitation should be resent by: Send to e-mail at: [email or web address]  Will anyone else be joining your video visit? No          Follow Up Surgical Weight Loss Supervised Diet Evaluation    Assessment:  Pt. is being seen today for a follow up RD nutritional evaluation. Pt. has been unsuccessful with non-surgical weight loss methods and is interested in bariatric surgery. Today we reviewed current eating habits and level of physical activity, and instructed on the changes that are required for successful bariatric outcomes.    Surgery of interest per pt: LSG.    Workflow review:  Support Group: Completed.  Psychology:Completed.  Lab work:Completed.  SWL:6/6      Weight goal: At or below initial.    Anthropometrics:  Pt's weight is 308 lbs 0 oz  Initial weight: 336lb  Weight change: down 28lb  BMI: Body mass index is 39.54 kg/m .   Ideal body weight: 83.5 kg (184 lb 0.1 oz)  Adjusted ideal body weight: 107.8 kg (237 lb 9.7 oz)    Medical History:  Patient Active Problem List   Diagnosis    ADHD (attention deficit hyperactivity disorder)    Hypertension    Hypertriglyceridemia    LINDA (obstructive sleep apnea)    Prediabetes    Pulmonary nodule    Morbid obesity (H)    Depression, unspecified depression type    Diabetes mellitus, type 2 (H)      Diabetes: yes    Progress over past month: has been doing well, excited for surgery, consistent with all bariatric nutrition changes    Diet Recall/Time  Breakfast: protein shake  Lunch: protein shake with veggies and fruit  Dinner: protein, starch, veggie    Meal duration: 15-20 minutes.      fluids by 30 minutes before, during meal, and waiting 30 minutes after meal before drinking fluids: yes    Beverages  Still working on eliminating soda      PES statement:     Obesity related to  excessive energy intake as evidenced by Intake of high caloric density foods/beverages (juice, soda, alcohol) at meals and/or snacks; large portions; frequent grazing; Estimated intake that exceeds estimated daily energy intake; Binge eating patterns; Frequent excessive fast food or restaurant intake; and BMI 39.54     Intervention    Nutrition Education:   Provided general overview of diet and lifestyle modifications needed to be a deemed a safe candidate for bariatric surgery.   Vitamins: Educated on post-op vitamin regimen including MVI+ 18 mg Fe two times a day, calcium citrate 400-600 mg two times a day, 1384-9967 mcg sublingual B12 daily, 5000 IU vitamin D3 daily.     Food/Nutrient Delivery:  Educated patient on eating three meals, with cutting out snacking.  Bariatric Plate: Patient and I discussed the importance of including a lean protein source (20-30 grams/meal), vegetables (included at lunch and dinner), one serving (15g) of carbohydrate, and limited added fat (1 tb/day) at each meal.   Educated patient on how to complete a food journal and benefits of meal planning.   Educated patient on using a protein powder drink as a meal replacement and/or supplement after bariatric surgery.   Discussed importance of adequate hydration after surgery, with goal of at least 64 oz of fluids/day.  Addressed avoiding all carbonated, caffeinated and sweetened drinks to prepare for bariatric surgery.     Nutrition Counseling:  Mindful eating techniques: Encouraged slow meal pace, chewing foods to applesauce consistency for 20-30 minutes/meal.   Discussed  fluids 30 minutes before, during, and after meal to prevent dumping syndrome and discomfort post bariatric surgery.   Discussed pre/post operative diet progression, post op vitamin regimen, gave review of surgery process.     Instructions/Goals:     Continue implementing bariatric surgery lifestyle modifications.    Handouts Provided:   Mark  vitamins    Monitor/Evaluation:  Pt. s target weight:  no gain from initial visit, pt. verbalized understanding.     Pt has completed all nutrition requirements and is well-informed of the dietary and physical activity requirements that are necessary for successful bariatric outcomes. This pt is an appropriate candidate for surgery from a nutrition standpoint at this time. The patient understands that surgery is a tool, not a cure, and post operative follow up is essential.       Video-Visit Details    Type of service:  Video Visit    Video Start Time (time video started): 2:00p    Video End Time (time video stopped): 2:15p    Originating Location (pt. Location): Home      Distant Location (provider location):  Off-site    Mode of Communication:  Video Conference via [facility]    Physician has received verbal consent for a Video Visit from the patient? Yes      Marie Floyd RD

## 2025-04-22 ENCOUNTER — OFFICE VISIT (OUTPATIENT)
Dept: FAMILY MEDICINE | Facility: CLINIC | Age: 45
End: 2025-04-22
Attending: PHYSICIAN ASSISTANT
Payer: COMMERCIAL

## 2025-04-22 VITALS
RESPIRATION RATE: 20 BRPM | DIASTOLIC BLOOD PRESSURE: 74 MMHG | TEMPERATURE: 98 F | HEART RATE: 70 BPM | SYSTOLIC BLOOD PRESSURE: 134 MMHG | WEIGHT: 313.5 LBS | OXYGEN SATURATION: 97 % | HEIGHT: 75 IN | BODY MASS INDEX: 38.98 KG/M2

## 2025-04-22 DIAGNOSIS — I10 ESSENTIAL HYPERTENSION: ICD-10-CM

## 2025-04-22 DIAGNOSIS — Z13.6 SCREENING FOR CARDIOVASCULAR CONDITION: ICD-10-CM

## 2025-04-22 DIAGNOSIS — E11.9 TYPE 2 DIABETES MELLITUS WITHOUT COMPLICATION, WITHOUT LONG-TERM CURRENT USE OF INSULIN (H): ICD-10-CM

## 2025-04-22 DIAGNOSIS — Z00.00 ROUTINE GENERAL MEDICAL EXAMINATION AT A HEALTH CARE FACILITY: Primary | ICD-10-CM

## 2025-04-22 LAB
EST. AVERAGE GLUCOSE BLD GHB EST-MCNC: 108 MG/DL
HBA1C MFR BLD: 5.4 % (ref 0–5.6)

## 2025-04-22 PROCEDURE — 99214 OFFICE O/P EST MOD 30 MIN: CPT | Mod: 25 | Performed by: PHYSICIAN ASSISTANT

## 2025-04-22 PROCEDURE — 99396 PREV VISIT EST AGE 40-64: CPT | Performed by: PHYSICIAN ASSISTANT

## 2025-04-22 PROCEDURE — 82043 UR ALBUMIN QUANTITATIVE: CPT | Performed by: PHYSICIAN ASSISTANT

## 2025-04-22 PROCEDURE — 80061 LIPID PANEL: CPT | Performed by: PHYSICIAN ASSISTANT

## 2025-04-22 PROCEDURE — 82570 ASSAY OF URINE CREATININE: CPT | Performed by: PHYSICIAN ASSISTANT

## 2025-04-22 PROCEDURE — G2211 COMPLEX E/M VISIT ADD ON: HCPCS | Performed by: PHYSICIAN ASSISTANT

## 2025-04-22 PROCEDURE — 36415 COLL VENOUS BLD VENIPUNCTURE: CPT | Performed by: PHYSICIAN ASSISTANT

## 2025-04-22 PROCEDURE — 83036 HEMOGLOBIN GLYCOSYLATED A1C: CPT | Performed by: PHYSICIAN ASSISTANT

## 2025-04-22 RX ORDER — LUMATEPERONE 42 MG/1
42 CAPSULE ORAL DAILY
COMMUNITY
Start: 2025-04-04

## 2025-04-22 RX ORDER — AMLODIPINE BESYLATE 5 MG/1
5 TABLET ORAL DAILY
Qty: 90 TABLET | Refills: 3 | Status: SHIPPED | OUTPATIENT
Start: 2025-04-22

## 2025-04-22 RX ORDER — LOSARTAN POTASSIUM 100 MG/1
100 TABLET ORAL DAILY
Qty: 90 TABLET | Refills: 3 | Status: SHIPPED | OUTPATIENT
Start: 2025-04-22

## 2025-04-22 SDOH — HEALTH STABILITY: PHYSICAL HEALTH: ON AVERAGE, HOW MANY DAYS PER WEEK DO YOU ENGAGE IN MODERATE TO STRENUOUS EXERCISE (LIKE A BRISK WALK)?: 3 DAYS

## 2025-04-22 SDOH — HEALTH STABILITY: PHYSICAL HEALTH: ON AVERAGE, HOW MANY MINUTES DO YOU ENGAGE IN EXERCISE AT THIS LEVEL?: 30 MIN

## 2025-04-22 ASSESSMENT — SOCIAL DETERMINANTS OF HEALTH (SDOH): HOW OFTEN DO YOU GET TOGETHER WITH FRIENDS OR RELATIVES?: ONCE A WEEK

## 2025-04-22 NOTE — PROGRESS NOTES
Preventive Care Visit  [facility]  Paresh Chad Gill PA-C, Family Medicine  Apr 22, 2025      Assessment & Plan     Routine general medical examination at a health care facility  Overall stable wellness visit.  Weight has improved currently managed through weight management.  Recent clearance for bariatric surgery.  Plans to proceed with this in the future.  This has not been scheduled.    Essential hypertension  Overall stable.  No longer needing hydrochlorothiazide.  Lasix also not being used.  Will maintain current regimen and continue focusing on weight loss.  With plans of substantial weight loss in the future, likely will need to reduce/eliminate medication in the future  - amLODIPine (NORVASC) 5 MG tablet; Take 1 tablet (5 mg) by mouth daily.  - losartan (COZAAR) 100 MG tablet; Take 1 tablet (100 mg) by mouth daily.  Medication use and side effects discussed with the patient. Patient is in complete understanding and agreement with plan.     Type 2 diabetes mellitus without complication, without long-term current use of insulin (H)  Past history.  6.9 A1c.  Much improved in December to 5.9.  On Zepbound/Mounjaro 10 mg.  Will recheck A1c today as well as urine albumin.  Did discuss CVD risk with diabetes.  LDL approximately 1 year ago was 76 however previous LDLs were less than 70.  If LDL is greater than 70, likely to start 5 mg Crestor daily.  Patient is in agreement.  - Albumin Random Urine Quantitative with Creat Ratio; Future  - Lipid panel reflex to direct LDL Fasting; Future  - Hemoglobin A1c; Future  - Albumin Random Urine Quantitative with Creat Ratio  - Lipid panel reflex to direct LDL Fasting  - Hemoglobin A1c  Medication use and side effects discussed with the patient. Patient is in complete understanding and agreement with plan.     Screening for cardiovascular condition      Patient has been advised of split billing requirements and indicates understanding:  "Yes        BMI  Estimated body mass index is 39.71 kg/m  as calculated from the following:    Height as of this encounter: 1.892 m (6' 2.5\").    Weight as of this encounter: 142.2 kg (313 lb 8 oz).   Weight management plan: Discussed healthy diet and exercise guidelines    Counseling  Appropriate preventive services were addressed with this patient via screening, questionnaire, or discussion as appropriate for fall prevention, nutrition, physical activity, Tobacco-use cessation, social engagement, weight loss and cognition.  Checklist reviewing preventive services available has been given to the patient.  Reviewed patient's diet, addressing concerns and/or questions.   He is at risk for lack of exercise and has been provided with information to increase physical activity for the benefit of his well-being.   The patient was instructed to see the dentist every 6 months.   He is at risk for psychosocial distress and has been provided with information to reduce risk.     Shandra Rodriguez is a 44 year old, presenting for the following:  Physical        4/22/2025    10:50 AM   Additional Questions   Roomed by Neena PERRY CMA   Accompanied by Self      3    HPI  Diabetes Follow-up    How often are you checking your blood sugar? Not at all  What concerns do you have today about your diabetes? None   Do you have any of these symptoms? (Select all that apply)  No numbness or tingling in feet.  No redness, sores or blisters on feet.  No complaints of excessive thirst.  No reports of blurry vision.  No significant changes to weight.      BP Readings from Last 2 Encounters:   04/22/25 134/74   12/18/24 118/70     Hemoglobin A1C (%)   Date Value   04/22/2025 5.4   12/18/2024 5.9 (H)     LDL Cholesterol Calculated (mg/dL)   Date Value   04/16/2024 76   06/23/2022 46             Hypertension Follow-up    Do you check your blood pressure regularly outside of the clinic? No   Are you following a low salt diet? Yes  Are your blood " pressures ever more than 140 on the top number (systolic) OR more   than 90 on the bottom number (diastolic), for example 140/90? N/A    BP Readings from Last 2 Encounters:   04/22/25 134/74   12/18/24 118/70             Advance Care Planning    Discussed advance care planning with patient; however, patient declined at this time.        4/22/2025   General Health   How would you rate your overall physical health? Good   Feel stress (tense, anxious, or unable to sleep) To some extent   (!) STRESS CONCERN      4/22/2025   Nutrition   Three or more servings of calcium each day? (!) NO   Diet: Regular (no restrictions)   How many servings of fruit and vegetables per day? (!) 2-3   How many sweetened beverages each day? (!) 4+         4/22/2025   Exercise   Days per week of moderate/strenous exercise 3 days   Average minutes spent exercising at this level 30 min         4/22/2025   Social Factors   Frequency of gathering with friends or relatives Once a week   Worry food won't last until get money to buy more No   Food not last or not have enough money for food? No   Do you have housing? (Housing is defined as stable permanent housing and does not include staying ouside in a car, in a tent, in an abandoned building, in an overnight shelter, or couch-surfing.) Yes   Are you worried about losing your housing? No   Lack of transportation? No   Unable to get utilities (heat,electricity)? No         4/22/2025   Dental   Dentist two times every year? (!) NO         Today's PHQ-2 Score:       4/22/2025    10:46 AM   PHQ-2 ( 1999 [employer])   Q1: Little interest or pleasure in doing things 1   Q2: Feeling down, depressed or hopeless 1   PHQ-2 Score 2    Q1: Little interest or pleasure in doing things Several days   Q2: Feeling down, depressed or hopeless Several days   PHQ-2 Score 2       Patient-reported           4/22/2025   Substance Use   Alcohol more than 3/day or more than 7/wk No   Do you use any other substances  "recreationally? No     Social History     Tobacco Use    Smoking status: Former     Current packs/day: 0.00     Types: Cigarettes     Quit date:      Years since quittin.3     Passive exposure: Never    Smokeless tobacco: Former    Tobacco comments:     Gio will - quit 10/2024   Vaping Use    Vaping status: Never Used   Substance Use Topics    Alcohol use: Not Currently    Drug use: Never           2025   STI Screening   New sexual partner(s) since last STI/HIV test? No   ASCVD Risk   The 10-year ASCVD risk score (Evaristo MONROE, et al., 2019) is: 3.4%    Values used to calculate the score:      Age: 44 years      Sex: Male      Is Non- : No      Diabetic: Yes      Tobacco smoker: No      Systolic Blood Pressure: 134 mmHg      Is BP treated: Yes      HDL Cholesterol: 28 mg/dL      Total Cholesterol: 130 mg/dL        2025   Contraception/Family Planning   Questions about contraception or family planning No        Reviewed and updated as needed this visit by Provider   Tobacco  Allergies  Meds  Problems  Med Hx  Surg Hx  Fam Hx                     Objective    Exam  /74 (BP Location: Left arm, Patient Position: Sitting, Cuff Size: Adult Regular)   Pulse 70   Temp 98  F (36.7  C) (Oral)   Resp 20   Ht 1.892 m (6' 2.5\")   Wt (!) 142.2 kg (313 lb 8 oz)   SpO2 97%   BMI 39.71 kg/m     Estimated body mass index is 39.71 kg/m  as calculated from the following:    Height as of this encounter: 1.892 m (6' 2.5\").    Weight as of this encounter: 142.2 kg (313 lb 8 oz).    Physical Exam  GENERAL: alert and no distress  EYES: Eyes grossly normal to inspection, PERRL and conjunctivae and sclerae normal  HENT: ear canals and TM's normal, nose and mouth without ulcers or lesions  NECK: no adenopathy, no asymmetry, masses, or scars  RESP: lungs clear to auscultation - no rales, rhonchi or wheezes  CV: regular rate and rhythm, normal S1 S2, no S3 or S4, no murmur, " click or rub, no peripheral edema  ABDOMEN: soft, nontender, no hepatosplenomegaly, no masses and bowel sounds normal  MS: no gross musculoskeletal defects noted, no edema  SKIN: no suspicious lesions or rashes  NEURO: Normal strength and tone, mentation intact and speech normal  PSYCH: mentation appears normal, affect normal/bright  LYMPH: no cervical adenopathy        Signed Electronically by: Paresh Gill PA-C  The longitudinal plan of care for the diagnosis(es)/condition(s) as documented were addressed during this visit. Due to the added complexity in care, I will continue to support Michael in the subsequent management and with ongoing continuity of care.

## 2025-04-22 NOTE — PATIENT INSTRUCTIONS
Patient Education   Preventive Care Advice   This is general advice given by our system to help you stay healthy. However, your care team may have specific advice just for you. Please talk to your care team about your preventive care needs.  Nutrition  Eat 5 or more servings of fruits and vegetables each day.  Try wheat bread, brown rice and whole grain pasta (instead of white bread, rice, and pasta).  Get enough calcium and vitamin D. Check the label on foods and aim for 100% of the RDA (recommended daily allowance).  Lifestyle  Exercise at least 150 minutes each week  (30 minutes a day, 5 days a week).  Do muscle strengthening activities 2 days a week. These help control your weight and prevent disease.  No smoking.  Wear sunscreen to prevent skin cancer.  Have a dental exam and cleaning every 6 months.  Yearly exams  See your health care team every year to talk about:  Any changes in your health.  Any medicines your care team has prescribed.  Preventive care, family planning, and ways to prevent chronic diseases.  Shots (vaccines)   HPV shots (up to age 26), if you've never had them before.  Hepatitis B shots (up to age 59), if you've never had them before.  COVID-19 shot: Get this shot when it's due.  Flu shot: Get a flu shot every year.  Tetanus shot: Get a tetanus shot every 10 years.  Pneumococcal, hepatitis A, and RSV shots: Ask your care team if you need these based on your risk.  Shingles shot (for age 50 and up)  General health tests  Diabetes screening:  Starting at age 35, Get screened for diabetes at least every 3 years.  If you are younger than age 35, ask your care team if you should be screened for diabetes.  Cholesterol test: At age 39, start having a cholesterol test every 5 years, or more often if advised.  Bone density scan (DEXA): At age 50, ask your care team if you should have this scan for osteoporosis (brittle bones).  Hepatitis C: Get tested at least once in your life.  STIs (sexually  transmitted infections)  Before age 24: Ask your care team if you should be screened for STIs.  After age 24: Get screened for STIs if you're at risk. You are at risk for STIs (including HIV) if:  You are sexually active with more than one person.  You don't use condoms every time.  You or a partner was diagnosed with a sexually transmitted infection.  If you are at risk for HIV, ask about PrEP medicine to prevent HIV.  Get tested for HIV at least once in your life, whether you are at risk for HIV or not.  Cancer screening tests  Cervical cancer screening: If you have a cervix, begin getting regular cervical cancer screening tests starting at age 21.  Breast cancer scan (mammogram): If you've ever had breasts, begin having regular mammograms starting at age 40. This is a scan to check for breast cancer.  Colon cancer screening: It is important to start screening for colon cancer at age 45.  Have a colonoscopy test every 10 years (or more often if you're at risk) Or, ask your provider about stool tests like a FIT test every year or Cologuard test every 3 years.  To learn more about your testing options, visit:   .  For help making a decision, visit:   https://bit.ly/cn03736.  Prostate cancer screening test: If you have a prostate, ask your care team if a prostate cancer screening test (PSA) at age 55 is right for you.  Lung cancer screening: If you are a current or former smoker ages 50 to 80, ask your care team if ongoing lung cancer screenings are right for you.  For informational purposes only. Not to replace the advice of your health care provider. Copyright   2023 [employer]. All rights reserved. Clinically reviewed by the [facility]itions [facility]. [place] [id number] - REV 01/24.  Learning About Stress  What is stress?     Stress is your body's response to a hard situation. Your body can have a physical, emotional, or mental response. Stress is a fact of life for most people, and it  affects everyone differently. What causes stress for you may not be stressful for someone else.  A lot of things can cause stress. You may feel stress when you go on a job interview, take a test, or run a race. This kind of short-term stress is normal and even useful. It can help you if you need to work hard or react quickly. For example, stress can help you finish an important job on time.  Long-term stress is caused by ongoing stressful situations or events. Examples of long-term stress include long-term health problems, ongoing problems at work, or conflicts in your family. Long-term stress can harm your health.  How does stress affect your health?  When you are stressed, your body responds as though you are in danger. It makes hormones that speed up your heart, make you breathe faster, and give you a burst of energy. This is called the fight-or-flight stress response. If the stress is over quickly, your body goes back to normal and no harm is done.  But if stress happens too often or lasts too long, it can have bad effects. Long-term stress can make you more likely to get sick, and it can make symptoms of some diseases worse. If you tense up when you are stressed, you may develop neck, shoulder, or low back pain. Stress is linked to high blood pressure and heart disease.  Stress also harms your emotional health. It can make you srinivasan, tense, or depressed. Your relationships may suffer, and you may not do well at work or school.  What can you do to manage stress?  You can try these things to help manage stress:   Do something active. Exercise or activity can help reduce stress. Walking is a great way to get started. Even everyday activities such as housecleaning or yard work can help.  Try yoga or rasheed chi. These techniques combine exercise and meditation. You may need some training at first to learn them.  Do something you enjoy. For example, listen to music or go to a movie. Practice your hobby or do volunteer  "work.  Meditate. This can help you relax, because you are not worrying about what happened before or what may happen in the future.  Do guided imagery. Imagine yourself in any setting that helps you feel calm. You can use online videos, books, or a teacher to guide you.  Do breathing exercises. For example:  From a standing position, bend forward from the waist with your knees slightly bent. Let your arms dangle close to the floor.  Breathe in slowly and deeply as you return to a standing position. Roll up slowly and lift your head last.  Hold your breath for just a few seconds in the standing position.  Breathe out slowly and bend forward from the waist.  Let your feelings out. Talk, laugh, cry, and express anger when you need to. Talking with supportive friends or family, a counselor, or a claude leader about your feelings is a healthy way to relieve stress. Avoid discussing your feelings with people who make you feel worse.  Write. It may help to write about things that are bothering you. This helps you find out how much stress you feel and what is causing it. When you know this, you can find better ways to cope.  What can you do to prevent stress?  You might try some of these things to help prevent stress:  Manage your time. This helps you find time to do the things you want and need to do.  Get enough sleep. Your body recovers from the stresses of the day while you are sleeping.  Get support. Your family, friends, and community can make a difference in how you experience stress.  Limit your news feed. Avoid or limit time on social media or news that may make you feel stressed.  Do something active. Exercise or activity can help reduce stress. Walking is a great way to get started.  Where can you learn more?  Go to https://www.Frage[employer].net/patiented  Enter N032 in the search box to learn more about \"Learning About Stress.\"  Current as of: October 24, 2024  Content Version: 14.4 2024-2025 [employer]   Care instructions adapted under license by your healthcare professional. If you have questions about a medical condition or this instruction, always ask your healthcare professional. [employer] disclaims any warranty or liability for your use of this information.

## 2025-04-23 LAB
CHOLEST SERPL-MCNC: 126 MG/DL
CREAT UR-MCNC: 166 MG/DL
FASTING STATUS PATIENT QL REPORTED: NO
HDLC SERPL-MCNC: 32 MG/DL
LDLC SERPL CALC-MCNC: 60 MG/DL
MICROALBUMIN UR-MCNC: <12 MG/L
MICROALBUMIN/CREAT UR: NORMAL MG/G{CREAT}
NONHDLC SERPL-MCNC: 94 MG/DL
TRIGL SERPL-MCNC: 172 MG/DL

## 2025-05-21 ENCOUNTER — TELEPHONE (OUTPATIENT)
Dept: SURGERY | Facility: CLINIC | Age: 45
End: 2025-05-21
Payer: COMMERCIAL

## 2025-05-21 NOTE — TELEPHONE ENCOUNTER
I have left a message for Michael letting him know that I have received his approval for surgery and I'm ready to get him scheduled with Dr. Hightower

## 2025-06-10 ENCOUNTER — MYC MEDICAL ADVICE (OUTPATIENT)
Dept: SURGERY | Facility: CLINIC | Age: 45
End: 2025-06-10
Payer: COMMERCIAL

## 2025-06-10 DIAGNOSIS — Z87.891 PERSONAL HISTORY OF TOBACCO USE, PRESENTING HAZARDS TO HEALTH: ICD-10-CM

## 2025-06-10 DIAGNOSIS — Z01.818 PRE-OPERATIVE CLEARANCE: Primary | ICD-10-CM

## 2025-07-03 ENCOUNTER — OFFICE VISIT (OUTPATIENT)
Dept: SURGERY | Facility: CLINIC | Age: 45
End: 2025-07-03
Payer: COMMERCIAL

## 2025-07-03 ENCOUNTER — LAB (OUTPATIENT)
Dept: LAB | Facility: CLINIC | Age: 45
End: 2025-07-03
Payer: COMMERCIAL

## 2025-07-03 VITALS
DIASTOLIC BLOOD PRESSURE: 84 MMHG | HEIGHT: 75 IN | BODY MASS INDEX: 39.17 KG/M2 | SYSTOLIC BLOOD PRESSURE: 136 MMHG | WEIGHT: 315 LBS

## 2025-07-03 DIAGNOSIS — I10 HYPERTENSION, UNSPECIFIED TYPE: ICD-10-CM

## 2025-07-03 DIAGNOSIS — Z87.891 PERSONAL HISTORY OF TOBACCO USE, PRESENTING HAZARDS TO HEALTH: ICD-10-CM

## 2025-07-03 DIAGNOSIS — Z01.818 PRE-OPERATIVE CLEARANCE: ICD-10-CM

## 2025-07-03 DIAGNOSIS — G47.33 OSA (OBSTRUCTIVE SLEEP APNEA): ICD-10-CM

## 2025-07-03 DIAGNOSIS — E66.01 MORBID OBESITY (H): Primary | ICD-10-CM

## 2025-07-03 DIAGNOSIS — E11.9 TYPE 2 DIABETES MELLITUS WITHOUT COMPLICATION, WITHOUT LONG-TERM CURRENT USE OF INSULIN (H): ICD-10-CM

## 2025-07-03 LAB
ALBUMIN SERPL BCG-MCNC: 4.4 G/DL (ref 3.5–5.2)
ALBUMIN UR-MCNC: NEGATIVE MG/DL
ALP SERPL-CCNC: 55 U/L (ref 40–150)
ALT SERPL W P-5'-P-CCNC: 32 U/L (ref 0–70)
ANION GAP SERPL CALCULATED.3IONS-SCNC: 10 MMOL/L (ref 7–15)
APPEARANCE UR: CLEAR
APTT PPP: 29 SECONDS (ref 22–38)
AST SERPL W P-5'-P-CCNC: 28 U/L (ref 0–45)
BACTERIA #/AREA URNS HPF: ABNORMAL /HPF
BASOPHILS # BLD AUTO: 0 10E3/UL (ref 0–0.2)
BASOPHILS NFR BLD AUTO: 1 %
BILIRUB SERPL-MCNC: 0.5 MG/DL
BILIRUB UR QL STRIP: NEGATIVE
BUN SERPL-MCNC: 18.4 MG/DL (ref 6–20)
CALCIUM SERPL-MCNC: 9.5 MG/DL (ref 8.8–10.4)
CHLORIDE SERPL-SCNC: 103 MMOL/L (ref 98–107)
COLOR UR AUTO: YELLOW
CREAT SERPL-MCNC: 0.91 MG/DL (ref 0.67–1.17)
EGFRCR SERPLBLD CKD-EPI 2021: >90 ML/MIN/1.73M2
EOSINOPHIL # BLD AUTO: 0.1 10E3/UL (ref 0–0.7)
EOSINOPHIL NFR BLD AUTO: 2 %
ERYTHROCYTE [DISTWIDTH] IN BLOOD BY AUTOMATED COUNT: 11.7 % (ref 10–15)
GLUCOSE SERPL-MCNC: 118 MG/DL (ref 70–99)
GLUCOSE UR STRIP-MCNC: NEGATIVE MG/DL
HCO3 SERPL-SCNC: 24 MMOL/L (ref 22–29)
HCT VFR BLD AUTO: 47.7 % (ref 40–53)
HGB BLD-MCNC: 17.2 G/DL (ref 13.3–17.7)
HGB UR QL STRIP: ABNORMAL
IMM GRANULOCYTES # BLD: 0 10E3/UL
IMM GRANULOCYTES NFR BLD: 0 %
INR PPP: 1.01 (ref 0.85–1.15)
KETONES UR STRIP-MCNC: NEGATIVE MG/DL
LEUKOCYTE ESTERASE UR QL STRIP: NEGATIVE
LYMPHOCYTES # BLD AUTO: 1.4 10E3/UL (ref 0.8–5.3)
LYMPHOCYTES NFR BLD AUTO: 21 %
MCH RBC QN AUTO: 32 PG (ref 26.5–33)
MCHC RBC AUTO-ENTMCNC: 36.1 G/DL (ref 31.5–36.5)
MCV RBC AUTO: 89 FL (ref 78–100)
MONOCYTES # BLD AUTO: 0.5 10E3/UL (ref 0–1.3)
MONOCYTES NFR BLD AUTO: 7 %
NEUTROPHILS # BLD AUTO: 4.4 10E3/UL (ref 1.6–8.3)
NEUTROPHILS NFR BLD AUTO: 69 %
NITRATE UR QL: NEGATIVE
PH UR STRIP: 6 [PH] (ref 5–8)
PLATELET # BLD AUTO: 202 10E3/UL (ref 150–450)
POTASSIUM SERPL-SCNC: 4.1 MMOL/L (ref 3.4–5.3)
PROT SERPL-MCNC: 6.9 G/DL (ref 6.4–8.3)
PROTHROMBIN TIME: 13.3 SECONDS (ref 11.8–14.8)
RBC # BLD AUTO: 5.38 10E6/UL (ref 4.4–5.9)
RBC #/AREA URNS AUTO: ABNORMAL /HPF
SODIUM SERPL-SCNC: 137 MMOL/L (ref 135–145)
SP GR UR STRIP: 1.02 (ref 1–1.03)
SQUAMOUS #/AREA URNS AUTO: ABNORMAL /LPF
UROBILINOGEN UR STRIP-ACNC: 0.2 E.U./DL
WBC # BLD AUTO: 6.4 10E3/UL (ref 4–11)
WBC #/AREA URNS AUTO: ABNORMAL /HPF

## 2025-07-03 NOTE — PATIENT INSTRUCTIONS
Additional Medication Instructions    All medications should be smaller than 1/4 inch for 6 weeks after surgery. Medications that are larger than 1/4 inch will need to be cut or crushed. Capsules will need to be opened. Medications that are extended release and can not be cut, crushed or opened will need to be switched to an immediate release version or not taken for 6 weeks. See instructions on your medications below.     Amlodipine (Brand Name: Norvasc)  Ok to cut/crush. Ask your primary doctor how often to monitor your blood pressure after surgery, and if you need to change your dosage. Take the morning of surgery with a sip of water.    Amphetamine/Dextroamphetamine (Brand Name: Adderall XR)  Ok to open the extended release capsule and sprinkle contents onto a spoonful of food. Do not chew mixture. Talk with primary care or mental health provider if you notice any changes in how well this medication is working after surgery.    Losartan (Brand Name: Cozaar)  Ok to cut/crush. Ask primary doctor how often to monitor your blood pressure after surgery, and if you need to change your dosage. Do not take the morning of surgery.    Lumateperone (Brand Name: Caplyta)  This medication cannot be cut/crushed and the capsules should not be opened. Bring with to the hospital so the surgery team can look at the size of it. This may need to be held for six weeks after surgery due to no immediate release or liquid versions of the medication. Ok to take the morning of surgery as scheduled.    Metformin (Brand Name: Glucophage)  Ok to cut or crush immediate release tablet. Stop two days prior to surgery. Can restart 2 days after surgery.    Tirzepatide (Brand Name: Mounjaro, Zepbound)  Stop taking this medication 1 week before surgery. Surgery team will determine if you will continue after surgery.

## 2025-07-03 NOTE — PROGRESS NOTES
Preoperative Evaluation  [facility] SURGERY CLINIC AND BARIATRICS CARE [address]  [facility] [address]  Phone: [phone]  Fax: [phone]  Primary Provider: Paresh Gill PA-C  Pre-op Performing Provider: BC Low CNP  Jul 3, 2025           6/30/2025   Surgical Information   What procedure is being done? Sleeve gastrectomy   Facility or Hospital where procedure/surgery will be performed: [facility]   Who is doing the procedure / surgery?    Date of surgery / procedure: 07/29/2025   Time of surgery / procedure:    Where do you plan to recover after surgery? Home with family     Fax number for surgical facility: Note does not need to be faxed, will be available electronically in Epic.    Assessment & Plan     The proposed surgical procedure is considered INTERMEDIATE risk.    Morbid obesity (H)  Undergoing bariatric surgery    Type 2 diabetes mellitus without complication, without long-term current use of insulin (H)  On tirzepatide; this will be stopped prior to surgery    LINDA (obstructive sleep apnea)  Will bring CPAP    Hypertension, unspecified type  Controlled on antihypertensive medication       - No identified additional risk factors other than previously addressed    Antiplatelet or Anticoagulation Medication Instructions   - We reviewed the medication list and the patient is not on an antiplatelet or anticoagulation medications.    Additional Medication Instructions    All medications should be smaller than 1/4 inch for 6 weeks after surgery. Medications that are larger than 1/4 inch will need to be cut or crushed. Capsules will need to be opened. Medications that are extended release and can not be cut, crushed or opened will need to be switched to an immediate release version or not taken for 6 weeks. See instructions on your medications below.     Amlodipine (Brand Name: Norvasc)  Ok to cut/crush. Ask your primary doctor how often  to monitor your blood pressure after surgery, and if you need to change your dosage. Take the morning of surgery with a sip of water.    Amphetamine/Dextroamphetamine (Brand Name: Adderall XR)  Ok to open the extended release capsule and sprinkle contents onto a spoonful of food. Do not chew mixture. Talk with primary care or mental health provider if you notice any changes in how well this medication is working after surgery.    Losartan (Brand Name: Cozaar)  Ok to cut/crush. Ask primary doctor how often to monitor your blood pressure after surgery, and if you need to change your dosage. Do not take the morning of surgery.    Lumateperone (Brand Name: Caplyta)  This medication cannot be cut/crushed and the capsules should not be opened. Bring with to the hospital so the surgery team can look at the size of it. This may need to be held for six weeks after surgery due to no immediate release or liquid versions of the medication. Ok to take the morning of surgery as scheduled.    Metformin (Brand Name: Glucophage)  Ok to cut or crush immediate release tablet. Stop two days prior to surgery. Can restart 2 days after surgery.    Tirzepatide (Brand Name: Mounjaro, Zepbound)  Stop taking this medication 1 week before surgery. Surgery team will determine if you will continue after surgery.     Recommendation  Approval given to proceed with proposed procedure pending review of diagnostic evaluation.      Shandra Rodriguez is a 45 year old, presenting for the following:  Pre-Op Exam        HPI: Michael Cunningham is a 45 year old male who presents today for preoperative clearance prior to bariatric surgery. He does not have any acute medical concerns at this time.          6/30/2025   Pre-Op Questionnaire   Have you ever had a heart attack or stroke? No   Have you ever had surgery on your heart or blood vessels, such as a stent placement, a coronary artery bypass, or surgery on an artery in your head, neck, heart, or legs? No    Do you have chest pain with activity? No   Do you have a history of heart failure? No   Do you currently have a cold, bronchitis or symptoms of other infection? No   Do you have a cough, shortness of breath, or wheezing? No   Do you or anyone in your family have previous history of blood clots? No   Do you or does anyone in your family have a serious bleeding problem such as prolonged bleeding following surgeries or cuts? No   Have you ever had problems with anemia or been told to take iron pills? No   Have you had any abnormal blood loss such as black, tarry or bloody stools? No   Have you ever had a blood transfusion? No   Are you willing to have a blood transfusion if it is medically needed before, during, or after your surgery? Yes   Have you or any of your relatives ever had problems with anesthesia? No   Do you have sleep apnea, excessive snoring or daytime drowsiness? (!) YES   Do you have a CPAP machine? Yes; will bring CPAP to hospital   Do you have any artifical heart valves or other implanted medical devices like a pacemaker, defibrillator, or continuous glucose monitor? No   Do you have artificial joints? No   Are you allergic to latex? No     Advance Care Planning    Discussed advance care planning with patient; however, patient declined at this time.      Status of Chronic Conditions:  See problem list for active medical problems.  Problems all longstanding and stable, except as noted/documented.  See ROS for pertinent symptoms related to these conditions.    Patient Active Problem List    Diagnosis Date Noted    Diabetes mellitus, type 2 (H) 12/18/2024     Priority: Medium    Depression, unspecified depression type 04/16/2024     Priority: Medium    LINDA (obstructive sleep apnea) 08/04/2021     Priority: Medium     Formatting of this note might be different from the original.  Created by Conversion      Hypertension 09/30/2020     Priority: Medium     Created by Conversion    Replacement Utility  updated for latest IMO load    Formatting of this note might be different from the original.  Created by Conversion    Replacement Utility updated for latest IMO load      Hypertriglyceridemia 09/15/2020     Priority: Medium    Prediabetes 2020     Priority: Medium    ADHD (attention deficit hyperactivity disorder) 10/12/2018     Priority: Medium    Pulmonary nodule 2015     Priority: Medium    Morbid obesity (H) 2015     Priority: Medium      Past Medical History:   Diagnosis Date    Anxiety     Depressive disorder     Hypertension     LINDA (obstructive sleep apnea)     Uses CPAP    Weight gain due to medication     abilify: rapid gain and developed borderline diabetes Fall of : glucose 198 and A1c of 6.3%.     Past Surgical History:   Procedure Laterality Date    OTHER SURGICAL HISTORY      nose      Current Outpatient Medications   Medication Sig Dispense Refill    amLODIPine (NORVASC) 5 MG tablet Take 1 tablet (5 mg) by mouth daily. 90 tablet 3    amphetamine-dextroamphetamine (ADDERALL XR) 15 MG 24 hr capsule Take 15 mg by mouth.      CAPLYTA 42 MG capsule Take 42 mg by mouth daily.      losartan (COZAAR) 100 MG tablet Take 1 tablet (100 mg) by mouth daily. 90 tablet 3    metFORMIN (GLUCOPHAGE) 500 MG tablet TAKE 2 TABLETS BY MOUTH ONCE DAILY AFTER A MEAL*      MOUNJARO 10 MG/0.5ML SOAJ auto-injector pen Inject 0.5 mLs (10 mg) subcutaneously once a week. 2 mL 0       Allergies   Allergen Reactions    Sulfa Antibiotics Anaphylaxis        Social History     Tobacco Use    Smoking status: Former     Current packs/day: 0.00     Types: Cigarettes     Quit date:      Years since quittin.5     Passive exposure: Never    Smokeless tobacco: Former    Tobacco comments:     Gio solis - quit 10/2024   Substance Use Topics    Alcohol use: Not Currently     Family History   Problem Relation Age of Onset    Snoring Mother     Hypertension Mother     Heart Disease Father     Diabetes  "Father     No Known Problems Sister     Cancer Maternal Grandfather         smoker, lung cancer    Cancer Paternal Grandmother         Brain , 58 years     Cancer Paternal Grandfather         lung, cancer     History   Drug Use Unknown             Review of Systems  Constitutional, neuro, ENT, endocrine, pulmonary, cardiac, gastrointestinal, genitourinary, musculoskeletal, integument and psychiatric systems are negative, except as otherwise noted.    Objective    /84   Ht 1.892 m (6' 2.5\")   Wt (!) 147 kg (324 lb)   BMI 41.04 kg/m     Estimated body mass index is 41.04 kg/m  as calculated from the following:    Height as of this encounter: 1.892 m (6' 2.5\").    Weight as of this encounter: 147 kg (324 lb).    General Appearance  No acute distress. Obesity   Alert and Oriented   Ambulatory without a device  HEENT  PERRLA, EOMI; Melampati Score II  Neck  Stout; No carotid bruits or JVD  Cardiovascular  Regular rate and rhythm  No murmur or gallop  Pulmonary  LS CTA bilaterally  Abdomen  Soft, NT/ND, No rebound, no guarding  No rashes  Extremities:  Pitting edema: +1  Distal pulses palpable  Neurologic  Cranial nerves grossly intact; no tremors present  Psychiatric  Thought content organized  Mood appears stable  Endocrine  Carrington Facies not present  Dorsal Thoracic Prominence not present  Skin tags present  Acanthosis nigricans not present  Purple Striae not present  Dermatologic  Intertrigo not present  Hirsutism not present    BC Rowe CNP  [phone]  [facility] General and Bariatric Surgery    "

## 2025-07-03 NOTE — LETTER
7/3/2025      Michael Cunningham  [address] [place] [address]      Dear Colleague,    Thank you for referring your patient, Michael Cunningham, to the [facility] SURGERY [facility] AND BARIATRICS [facility]. Please see a copy of my visit note below.    Preoperative Evaluation  [facility] SURGERY [facility] AND BARIATRICS CARE [place]  [address]  [facility] [address]  Phone: [phone]  Fax: [phone]  Primary Provider: Paresh Gill PA-C  Pre-op Performing Provider: BC Low CNP  Jul 3, 2025           6/30/2025   Surgical Information   What procedure is being done? Sleeve gastrectomy   Facility or Hospital where procedure/surgery will be performed: [facility]   Who is doing the procedure / surgery?    Date of surgery / procedure: 07/29/2025   Time of surgery / procedure:    Where do you plan to recover after surgery? Home with family     Fax number for surgical facility: Note does not need to be faxed, will be available electronically in Epic.    Assessment & Plan    The proposed surgical procedure is considered INTERMEDIATE risk.    Morbid obesity (H)  Undergoing bariatric surgery    Type 2 diabetes mellitus without complication, without long-term current use of insulin (H)  On tirzepatide; this will be stopped prior to surgery    LINDA (obstructive sleep apnea)  Will bring CPAP    Hypertension, unspecified type  Controlled on antihypertensive medication       - No identified additional risk factors other than previously addressed    Antiplatelet or Anticoagulation Medication Instructions   - We reviewed the medication list and the patient is not on an antiplatelet or anticoagulation medications.    Additional Medication Instructions    All medications should be smaller than 1/4 inch for 6 weeks after surgery. Medications that are larger than 1/4 inch will need to be cut or crushed. Capsules will need to be opened. Medications that are extended  release and can not be cut, crushed or opened will need to be switched to an immediate release version or not taken for 6 weeks. See instructions on your medications below.     Amlodipine (Brand Name: Norvasc)  Ok to cut/crush. Ask your primary doctor how often to monitor your blood pressure after surgery, and if you need to change your dosage. Take the morning of surgery with a sip of water.    Amphetamine/Dextroamphetamine (Brand Name: Adderall XR)  Ok to open the extended release capsule and sprinkle contents onto a spoonful of food. Do not chew mixture. Talk with primary care or mental health provider if you notice any changes in how well this medication is working after surgery.    Losartan (Brand Name: Cozaar)  Ok to cut/crush. Ask primary doctor how often to monitor your blood pressure after surgery, and if you need to change your dosage. Do not take the morning of surgery.    Lumateperone (Brand Name: Caplyta)  This medication cannot be cut/crushed and the capsules should not be opened. Bring with to the hospital so the surgery team can look at the size of it. This may need to be held for six weeks after surgery due to no immediate release or liquid versions of the medication. Ok to take the morning of surgery as scheduled.    Metformin (Brand Name: Glucophage)  Ok to cut or crush immediate release tablet. Stop two days prior to surgery. Can restart 2 days after surgery.    Tirzepatide (Brand Name: Mounjaro, Zepbound)  Stop taking this medication 1 week before surgery. Surgery team will determine if you will continue after surgery.     Recommendation  Approval given to proceed with proposed procedure pending review of diagnostic evaluation.      Shandra Rodriguez is a 45 year old, presenting for the following:  Pre-Op Exam        HPI: Michael Cunningham is a 45 year old male who presents today for preoperative clearance prior to bariatric surgery. He does not have any acute medical concerns at this time.           6/30/2025   Pre-Op Questionnaire   Have you ever had a heart attack or stroke? No   Have you ever had surgery on your heart or blood vessels, such as a stent placement, a coronary artery bypass, or surgery on an artery in your head, neck, heart, or legs? No   Do you have chest pain with activity? No   Do you have a history of heart failure? No   Do you currently have a cold, bronchitis or symptoms of other infection? No   Do you have a cough, shortness of breath, or wheezing? No   Do you or anyone in your family have previous history of blood clots? No   Do you or does anyone in your family have a serious bleeding problem such as prolonged bleeding following surgeries or cuts? No   Have you ever had problems with anemia or been told to take iron pills? No   Have you had any abnormal blood loss such as black, tarry or bloody stools? No   Have you ever had a blood transfusion? No   Are you willing to have a blood transfusion if it is medically needed before, during, or after your surgery? Yes   Have you or any of your relatives ever had problems with anesthesia? No   Do you have sleep apnea, excessive snoring or daytime drowsiness? (!) YES   Do you have a CPAP machine? Yes; will bring CPAP to hospital   Do you have any artifical heart valves or other implanted medical devices like a pacemaker, defibrillator, or continuous glucose monitor? No   Do you have artificial joints? No   Are you allergic to latex? No     Advance Care Planning    Discussed advance care planning with patient; however, patient declined at this time.      Status of Chronic Conditions:  See problem list for active medical problems.  Problems all longstanding and stable, except as noted/documented.  See ROS for pertinent symptoms related to these conditions.    Patient Active Problem List    Diagnosis Date Noted     Diabetes mellitus, type 2 (H) 12/18/2024     Priority: Medium     Depression, unspecified depression type 04/16/2024      Priority: Medium     LINDA (obstructive sleep apnea) 2021     Priority: Medium     Formatting of this note might be different from the original.  Created by Conversion       Hypertension 2020     Priority: Medium     Created by Conversion    Replacement Utility updated for latest IMO load    Formatting of this note might be different from the original.  Created by Conversion    Replacement Utility updated for latest IMO load       Hypertriglyceridemia 09/15/2020     Priority: Medium     Prediabetes 2020     Priority: Medium     ADHD (attention deficit hyperactivity disorder) 10/12/2018     Priority: Medium     Pulmonary nodule 2015     Priority: Medium     Morbid obesity (H) 2015     Priority: Medium      Past Medical History:   Diagnosis Date     Anxiety      Depressive disorder      Hypertension      LINDA (obstructive sleep apnea)     Uses CPAP     Weight gain due to medication     abilify: rapid gain and developed borderline diabetes : glucose 198 and A1c of 6.3%.     Past Surgical History:   Procedure Laterality Date     OTHER SURGICAL HISTORY      nose      Current Outpatient Medications   Medication Sig Dispense Refill     amLODIPine (NORVASC) 5 MG tablet Take 1 tablet (5 mg) by mouth daily. 90 tablet 3     amphetamine-dextroamphetamine (ADDERALL XR) 15 MG 24 hr capsule Take 15 mg by mouth.       CAPLYTA 42 MG capsule Take 42 mg by mouth daily.       losartan (COZAAR) 100 MG tablet Take 1 tablet (100 mg) by mouth daily. 90 tablet 3     metFORMIN (GLUCOPHAGE) 500 MG tablet TAKE 2 TABLETS BY MOUTH ONCE DAILY AFTER A MEAL*       MOUNJARO 10 MG/0.5ML SOAJ auto-injector pen Inject 0.5 mLs (10 mg) subcutaneously once a week. 2 mL 0       Allergies   Allergen Reactions     Sulfa Antibiotics Anaphylaxis        Social History     Tobacco Use     Smoking status: Former     Current packs/day: 0.00     Types: Cigarettes     Quit date:      Years since quittin.5  "    Passive exposure: Never     Smokeless tobacco: Former     Tobacco comments:     Gio will - quit 10/2024   Substance Use Topics     Alcohol use: Not Currently     Family History   Problem Relation Age of Onset     Snoring Mother      Hypertension Mother      Heart Disease Father      Diabetes Father      No Known Problems Sister      Cancer Maternal Grandfather         smoker, lung cancer     Cancer Paternal Grandmother         Brain , 58 years      Cancer Paternal Grandfather         lung, cancer     History   Drug Use Unknown             Review of Systems  Constitutional, neuro, ENT, endocrine, pulmonary, cardiac, gastrointestinal, genitourinary, musculoskeletal, integument and psychiatric systems are negative, except as otherwise noted.    Objective   /84   Ht 1.892 m (6' 2.5\")   Wt (!) 147 kg (324 lb)   BMI 41.04 kg/m     Estimated body mass index is 41.04 kg/m  as calculated from the following:    Height as of this encounter: 1.892 m (6' 2.5\").    Weight as of this encounter: 147 kg (324 lb).    General Appearance  No acute distress. Obesity   Alert and Oriented   Ambulatory without a device  HEENT  PERRLA, EOMI; Melampati Score II  Neck  Stout; No carotid bruits or JVD  Cardiovascular  Regular rate and rhythm  No murmur or gallop  Pulmonary  LS CTA bilaterally  Abdomen  Soft, NT/ND, No rebound, no guarding  No rashes  Extremities:  Pitting edema: +1  Distal pulses palpable  Neurologic  Cranial nerves grossly intact; no tremors present  Psychiatric  Thought content organized  Mood appears stable  Endocrine  Carrington Facies not present  Dorsal Thoracic Prominence not present  Skin tags present  Acanthosis nigricans not present  Purple Striae not present  Dermatologic  Intertrigo not present  Hirsutism not present    BC Rowe [facility]  [phone]  [facility] General and Bariatric Surgery      Again, thank you for allowing me to participate in the care of your patient.  "       Sincerely,        BC Low CNP    Electronically signed

## 2025-07-03 NOTE — H&P (VIEW-ONLY)
Preoperative Evaluation  [facility] [address]  [facility] [address]  Phone: [phone]  Fax: [phone]  Primary Provider: Paresh Gill PA-C  Pre-op Performing Provider: BC Low CNP  Jul 3, 2025           6/30/2025   Surgical Information   What procedure is being done? Sleeve gastrectomy   Facility or Hospital where procedure/surgery will be performed: [facility]   Who is doing the procedure / surgery?    Date of surgery / procedure: 07/29/2025   Time of surgery / procedure:    Where do you plan to recover after surgery? Home with family     Fax number for surgical facility: Note does not need to be faxed, will be available electronically in Epic.    Assessment & Plan     The proposed surgical procedure is considered INTERMEDIATE risk.    Morbid obesity (H)  Undergoing bariatric surgery    Type 2 diabetes mellitus without complication, without long-term current use of insulin (H)  On tirzepatide; this will be stopped prior to surgery    LINDA (obstructive sleep apnea)  Will bring CPAP    Hypertension, unspecified type  Controlled on antihypertensive medication       - No identified additional risk factors other than previously addressed    Antiplatelet or Anticoagulation Medication Instructions   - We reviewed the medication list and the patient is not on an antiplatelet or anticoagulation medications.    Additional Medication Instructions    All medications should be smaller than 1/4 inch for 6 weeks after surgery. Medications that are larger than 1/4 inch will need to be cut or crushed. Capsules will need to be opened. Medications that are extended release and can not be cut, crushed or opened will need to be switched to an immediate release version or not taken for 6 weeks. See instructions on your medications below.     Amlodipine (Brand Name: Norvasc)  Ok to cut/crush. Ask your primary doctor how often  to monitor your blood pressure after surgery, and if you need to change your dosage. Take the morning of surgery with a sip of water.    Amphetamine/Dextroamphetamine (Brand Name: Adderall XR)  Ok to open the extended release capsule and sprinkle contents onto a spoonful of food. Do not chew mixture. Talk with primary care or mental health provider if you notice any changes in how well this medication is working after surgery.    Losartan (Brand Name: Cozaar)  Ok to cut/crush. Ask primary doctor how often to monitor your blood pressure after surgery, and if you need to change your dosage. Do not take the morning of surgery.    Lumateperone (Brand Name: Caplyta)  This medication cannot be cut/crushed and the capsules should not be opened. Bring with to the hospital so the surgery team can look at the size of it. This may need to be held for six weeks after surgery due to no immediate release or liquid versions of the medication. Ok to take the morning of surgery as scheduled.    Metformin (Brand Name: Glucophage)  Ok to cut or crush immediate release tablet. Stop two days prior to surgery. Can restart 2 days after surgery.    Tirzepatide (Brand Name: Mounjaro, Zepbound)  Stop taking this medication 1 week before surgery. Surgery team will determine if you will continue after surgery.     Recommendation  Approval given to proceed with proposed procedure pending review of diagnostic evaluation.      Shandra Rodriguez is a 45 year old, presenting for the following:  Pre-Op Exam        HPI: Michael Cunningham is a 45 year old male who presents today for preoperative clearance prior to bariatric surgery. He does not have any acute medical concerns at this time.          6/30/2025   Pre-Op Questionnaire   Have you ever had a heart attack or stroke? No   Have you ever had surgery on your heart or blood vessels, such as a stent placement, a coronary artery bypass, or surgery on an artery in your head, neck, heart, or legs? No    Do you have chest pain with activity? No   Do you have a history of heart failure? No   Do you currently have a cold, bronchitis or symptoms of other infection? No   Do you have a cough, shortness of breath, or wheezing? No   Do you or anyone in your family have previous history of blood clots? No   Do you or does anyone in your family have a serious bleeding problem such as prolonged bleeding following surgeries or cuts? No   Have you ever had problems with anemia or been told to take iron pills? No   Have you had any abnormal blood loss such as black, tarry or bloody stools? No   Have you ever had a blood transfusion? No   Are you willing to have a blood transfusion if it is medically needed before, during, or after your surgery? Yes   Have you or any of your relatives ever had problems with anesthesia? No   Do you have sleep apnea, excessive snoring or daytime drowsiness? (!) YES   Do you have a CPAP machine? Yes; will bring CPAP to hospital   Do you have any artifical heart valves or other implanted medical devices like a pacemaker, defibrillator, or continuous glucose monitor? No   Do you have artificial joints? No   Are you allergic to latex? No     Advance Care Planning    Discussed advance care planning with patient; however, patient declined at this time.      Status of Chronic Conditions:  See problem list for active medical problems.  Problems all longstanding and stable, except as noted/documented.  See ROS for pertinent symptoms related to these conditions.    Patient Active Problem List    Diagnosis Date Noted    Diabetes mellitus, type 2 (H) 12/18/2024     Priority: Medium    Depression, unspecified depression type 04/16/2024     Priority: Medium    LINDA (obstructive sleep apnea) 08/04/2021     Priority: Medium     Formatting of this note might be different from the original.  Created by Conversion      Hypertension 09/30/2020     Priority: Medium     Created by Conversion    Replacement Utility  updated for latest IMO load    Formatting of this note might be different from the original.  Created by Conversion    Replacement Utility updated for latest IMO load      Hypertriglyceridemia 09/15/2020     Priority: Medium    Prediabetes 2020     Priority: Medium    ADHD (attention deficit hyperactivity disorder) 10/12/2018     Priority: Medium    Pulmonary nodule 2015     Priority: Medium    Morbid obesity (H) 2015     Priority: Medium      Past Medical History:   Diagnosis Date    Anxiety     Depressive disorder     Hypertension     LINDA (obstructive sleep apnea)     Uses CPAP    Weight gain due to medication     abilify: rapid gain and developed borderline diabetes Fall of : glucose 198 and A1c of 6.3%.     Past Surgical History:   Procedure Laterality Date    OTHER SURGICAL HISTORY      nose      Current Outpatient Medications   Medication Sig Dispense Refill    amLODIPine (NORVASC) 5 MG tablet Take 1 tablet (5 mg) by mouth daily. 90 tablet 3    amphetamine-dextroamphetamine (ADDERALL XR) 15 MG 24 hr capsule Take 15 mg by mouth.      CAPLYTA 42 MG capsule Take 42 mg by mouth daily.      losartan (COZAAR) 100 MG tablet Take 1 tablet (100 mg) by mouth daily. 90 tablet 3    metFORMIN (GLUCOPHAGE) 500 MG tablet TAKE 2 TABLETS BY MOUTH ONCE DAILY AFTER A MEAL*      MOUNJARO 10 MG/0.5ML SOAJ auto-injector pen Inject 0.5 mLs (10 mg) subcutaneously once a week. 2 mL 0       Allergies   Allergen Reactions    Sulfa Antibiotics Anaphylaxis        Social History     Tobacco Use    Smoking status: Former     Current packs/day: 0.00     Types: Cigarettes     Quit date:      Years since quittin.5     Passive exposure: Never    Smokeless tobacco: Former    Tobacco comments:     Gio solis - quit 10/2024   Substance Use Topics    Alcohol use: Not Currently     Family History   Problem Relation Age of Onset    Snoring Mother     Hypertension Mother     Heart Disease Father     Diabetes  "Father     No Known Problems Sister     Cancer Maternal Grandfather         smoker, lung cancer    Cancer Paternal Grandmother         Brain , 58 years     Cancer Paternal Grandfather         lung, cancer     History   Drug Use Unknown             Review of Systems  Constitutional, neuro, ENT, endocrine, pulmonary, cardiac, gastrointestinal, genitourinary, musculoskeletal, integument and psychiatric systems are negative, except as otherwise noted.    Objective    /84   Ht 1.892 m (6' 2.5\")   Wt (!) 147 kg (324 lb)   BMI 41.04 kg/m     Estimated body mass index is 41.04 kg/m  as calculated from the following:    Height as of this encounter: 1.892 m (6' 2.5\").    Weight as of this encounter: 147 kg (324 lb).    General Appearance  No acute distress. Obesity   Alert and Oriented   Ambulatory without a device  HEENT  PERRLA, EOMI; Melampati Score II  Neck  Stout; No carotid bruits or JVD  Cardiovascular  Regular rate and rhythm  No murmur or gallop  Pulmonary  LS CTA bilaterally  Abdomen  Soft, NT/ND, No rebound, no guarding  No rashes  Extremities:  Pitting edema: +1  Distal pulses palpable  Neurologic  Cranial nerves grossly intact; no tremors present  Psychiatric  Thought content organized  Mood appears stable  Endocrine  [place] Facies not present  Dorsal Thoracic Prominence not present  Skin tags present  Acanthosis nigricans not present  Purple Striae not present  Dermatologic  Intertrigo not present  Hirsutism not present    BC Rowe CNP  [phone]  [facility]    "

## 2025-07-09 ENCOUNTER — ALLIED HEALTH/NURSE VISIT (OUTPATIENT)
Dept: SURGERY | Facility: CLINIC | Age: 45
End: 2025-07-09
Payer: COMMERCIAL

## 2025-07-09 VITALS — HEIGHT: 75 IN | BODY MASS INDEX: 39.17 KG/M2 | WEIGHT: 315 LBS

## 2025-07-09 DIAGNOSIS — Z98.84 S/P BARIATRIC SURGERY: ICD-10-CM

## 2025-07-09 DIAGNOSIS — Z90.3 POSTGASTRECTOMY MALABSORPTION: ICD-10-CM

## 2025-07-09 DIAGNOSIS — R63.4 RAPID WEIGHT LOSS: ICD-10-CM

## 2025-07-09 DIAGNOSIS — K91.2 POSTGASTRECTOMY MALABSORPTION: ICD-10-CM

## 2025-07-09 DIAGNOSIS — K21.9 ACID REFLUX: ICD-10-CM

## 2025-07-09 DIAGNOSIS — E66.813 CLASS 3 SEVERE OBESITY DUE TO EXCESS CALORIES WITH SERIOUS COMORBIDITY AND BODY MASS INDEX (BMI) OF 40.0 TO 44.9 IN ADULT (H): ICD-10-CM

## 2025-07-09 DIAGNOSIS — Z71.89 ENCOUNTER FOR PRE-BARIATRIC SURGERY COUNSELING AND EDUCATION: Primary | ICD-10-CM

## 2025-07-09 RX ORDER — URSODIOL 300 MG/1
300 CAPSULE ORAL 2 TIMES DAILY
Qty: 180 CAPSULE | Refills: 1 | Status: SHIPPED | OUTPATIENT
Start: 2025-07-29 | End: 2026-01-25

## 2025-07-09 RX ORDER — MULTIVIT-MIN/FOLIC/VIT K/LYCOP 400-300MCG
1 TABLET ORAL 2 TIMES DAILY
Qty: 180 TABLET | Refills: 3 | Status: SHIPPED | OUTPATIENT
Start: 2025-07-09

## 2025-07-09 RX ORDER — OMEPRAZOLE 20 MG/1
20 CAPSULE, DELAYED RELEASE ORAL DAILY
Qty: 90 CAPSULE | Refills: 0 | Status: SHIPPED | OUTPATIENT
Start: 2025-07-09 | End: 2025-10-07

## 2025-07-09 NOTE — PATIENT INSTRUCTIONS
Patient name: Michael Cunningham  Surgery:  Sleeve Gastrectomy  Surgery Date:  7/29/2025  Surgery Time: 7:30 am (*This time is just a tentative time and may end up changing.*)  Arrival Time to Hospital: 6:00 am (90 minutes prior to surgery time)  Surgeon: Real Hightower MD       MONTH BEFORE SURGERY    Pre-operative History and Physical with addition to pre-operative testing (Labs, EKG and CXR).      Please let us know if you aren't feeling well or have tested positive for Covid-19 between now and your surgery date. Let us know if you have any questions regarding this.      2 WEEKS BEFORE SURGERY    Start Preoperative Liquid Diet per dietitian instructions      WEEK BEFORE SURGERY CHECKLIST       Continue Full Liquid Diet per dietitian instructions    2.   Purchase diet components for after surgery      3.   Arrange for someone to stay with you the first 1-2 nights you return home.     4.   Arrange for a ride home from the hospital.  We can't give an exact time for  because it depends on how you are doing with your drinking and pain control.  Most people are reaching their goals for discharge by lunch time and you will need to have a ride ready and waiting by 11 am.    5.   Do your grocery/vitamin shopping for before AND after surgery.    6.   Make sure you have a bowel movement if you haven t gone in a while. There is no need for a bowel prep before surgery.       7.   Make sure you have picked up the prescriptions/supplements that were sent to your pharmacy - [id number] [address] [place] [address] [[id number]]      NEW PRESCRIPTIONS:  In preparation for surgery, we have prescribed some medication that you will need to  as soon as possible     A. Vitamins: Chewable Multivitamin and Vitamin B12 (if you weren't taking already)  - You can start taking the Multivitamin and B12 as soon as you pick this up from the pharmacy.  Some insurance companies will not cover the vitamins because they are  available over the counter, so in those cases you will need to purchase them yourselves.  Do not take the morning of surgery.    B. Acid Reducer:  Omeprazole (Prilosec) - If not already taking, you will get a prescription to start when you get home from the hospital.  Tablets cannot be cut or crushed.  If a capsule, entire capsule contents may be sprinkled on a spoonful of applesauce and taken immediately without chewing.  If only on a full liquid diet, dump capsule contents into a spoonful of liquid and take without chewing.  May swallow whole again starting 6 weeks after surgery but can try swallowing sooner if having issues with opening into food.  Continue after surgery for at least 3 months even without symptoms of acid reflux. Do not take the morning of surgery.     C. Gallstone Reduction: Ursodiol (Actigall ) if needed - With rapid weight loss, your risk of gallstones increases.  If your still have your gallbladder after surgery, you will be given a prescription for Actigall  that you should start taking 2 weeks after surgery.  Actigall  will help prevent gallstones from forming.  If you are prescribed this medication, you will usually take it for six months. We encourage you to take this tablet or capsule whole with warm or hot liquid to help it dissolve before it reaches your stomach pouch.  This medication is bigger than the   inch size restriction, but we will not have you cut/crush it due to it's unpleasant metallic taste. Common Side Effects: constipation, diarrhea, upset stomach, nausea, dizziness       BEFORE Surgery Medication Considerations:  Certain medications may need to be stopped before major surgery. Some medications may increase your risk of bleeding, others may change the way your blood clots, and other medications can affect your lab work. The bariatric clinic will give you specific instructions about when to stop these medications.    This timeline shows when certain medications should be  stopped before surgery: This is a general timeline, if your bariatric nurse or surgeon gives you other instructions, follow those specific instructions.    30 Days (One Month) Before Surgery  Birth control pills & patches that contain estrogen  You must use alternative forms of contraception  Hormone replacement therapy   Premarin  Testosterone  14 Days (Two Weeks) Before Surgery  Appetite control medications   Phentermine  Other: __________  Diuretics ( water pills )   Talk to your primary doctor.  You may need an alternative medication,  Hydrochlorothiazide (HCTZ)  Furosemide (Lasix )  Bumetanide (Bumex )  Spironolactone (Aldactone )  Chlorthalidone  Any blood pressure medication that is combined with a diuretic  Herbal supplements  Fish oil or Omega 3   Homeopathic remedies  7 Days (One Week) Before Surgery  Anti-platelet medications and medications that help to prevent blood clots:  Clopidogrel (Plavix ), Prasugrel (Effient ), Ticagrelor (Brilinta )  Aspirin/Dipyridamole (Aggrenox ), Dipyridamole (Persantine )  Cilostazol (Pletal )  All Non-Steroidal Anti-Inflammatory Drugs (NSAIDs):   Non-prescription: Ibuprofen (Advil , Motrin , Nuprin ), Naproxen (Naprosyn , Aleve , Anaprox ),   Prescription: Piroxicam (Feldene ), Sulindac (Clinoril ), Tolmentin (Tolectin ), Celecoxib (Celebrex ), Diclofenac (Voltaren ), Indomethacin (Indocin ), Nambumetone (Relafen ), Flurbiprofen (Ansaid ), Ketoprofen (Orudis ), Etodolac (Lodine ), Meloxicam (Mobic ), Oxaprozin (Daypro )  Aspirin (including low-dose (81mg) and chewable  baby aspirin )  Warfarin (Coumadin , Jantoven )  When warfarin is restarted (usually 24-48 hrs after surgery), dosing and INR monitoring will be managed by the Bariatric Clinic for 4-6 weeks after your surgery date.   The Day Before Surgery  Diabetes medications: Follow the instructions on the  Diabetes Management for the Bariatric Surgery Patient  form.  The Day of Surgery  Diabetes medications: Follow the  instructions on the  Diabetes Management for the Bariatric Surgery Patient  form.      Packing for the Hospital  When packing for the hospital, anticipate staying overnight. Make a checklist so that you don't forget things you really want to have with you.    Bring a couple protein shakes with you to the hospital.  This is especially necessary if you are dairy intolerant because they don't have what you would need available at the hospital.  Bring a folder with your printed patient handouts to keep handy and add discharge paperwork to if you want.  Bring your insurance card.  Bring a current medication list OR [facility] (including vitamins, over-the-counter medication, eye drops, inhalers, patches, ointments and herbal products). Include the name, correct dose and the times you take them each day. List all allergies.  If you use a CPAP or BIPAP, bring it with you.  You may also want to bring a water bottle of the water you use in your machine.  Bring a copy of your health care or advanced directive document if you have one.  You may bring your own gown/pajamas and robe if you don't want to wear the hospital-supplied items once IV is disconnected.  Slippers or shoes should have a non-slip sole.  You may bring your own personal care items such as toothbrush, toothpaste, shampoo, denture cleanser, comb, skin care products, deodorant, make-up, and hair dryer.  If you wear a hearing aid, bring a labeled storage container and extra batteries.  If you wear glasses or contacts, bring a labeled case and saline for contacts. Do not plan to wear contact lenses the day of surgery. It is best to bring your glasses so that you can wear them in the recovery room. You cannot wear contact lenses during surgery.  Bring loose-fitting clothing to wear home. Bring telephone numbers (including work numbers) of family and friends.  You may want to bring a journal to document your thoughts and feelings.  You may not wear any  "jewelry on or in any area of your body to the operating room.   Leave money, jewelry, or any valuables at home.        DAY BEFORE SURGERY CHECKLIST      Clear liquid diet until 4 hours prior to your surgery     2.  Nothing to eat or drink 4 hours prior to your surgery time.       3.  Pack a couple preferred protein shakes to have available in the hospital -See approved list of liquids from dietitian      4.  Use Hibiclens packet to shower.  Use a fresh, clean towel to dry off. See \"Showering Before Surgery handout\" below.       MORNING OF SURGERY CHECKLIST      Use second Hibiclens packet to shower. Use a fresh, clean towel to dry off. See \"Showering Before Surgery handout\" below.     2.   Remove all jewelry and piercings.      3.   Take any medications you have been advised to take the morning of surgery with a sip of water. (See med list above)     4.   Arrive 90 minutes prior to your scheduled surgery time.    Showering Before Surgery  [email or web address]     Preparing Your Skin  [email or web address]    HOSPITAL STAY    Park in the [facility] Visitor Parking Lot in [place] and check in at the information desk where they will escort you to the [facility].     You will be allowed to have 2 support people with you in the pre-operative area at the hospital.  After surgery on the recovery unit, you can have up to 4 visitors and they must leave when visitor hours are over.     Meet your surgical team which includes an RN, CRNA, anesthesiologist and your surgeon.    IV will be started for fluid management, pain medication and possible antibiotics.    Anticoagulant therapy (blood thinner) will be given and continued until you are discharged.    Pneumatic compression stockings will be placed on your legs before surgery to help prevent blood clots. You can also move your feet up and down frequently to aid in circulation.    An incentive spirometer will be used to promote good lung expansion " and prevent pneumonia. This must be within arm's reach of you, and you should be doing it ten times every hour while awake.      PAIN MEDICATION IN THE HOSPITAL  Treating pain and discomfort is important to your recovery.  Your surgeon will order pain medication for you in the hospital.  You will also get a take home prescription for pain medication after surgery.  Our goal is not pain free, but an acceptable level of discomfort; you should be able to move without pain limiting your activity    In the Preoperative area, you will receive additional pain treatment   Intrathecal Spinae Block (Duramorph)- which is an ultrasound guided injection for pain medication   Ketorolac (Toradol )  This is an anti-inflammatory medication used to treat swelling and moderate pain  It is used only for 24 hours after surgery to decrease inflammation and pain  Given through your IV  Takes about 30 minutes to take effect  Common side effects: nausea, upset stomach  If your pain is not well controlled with this, you may be given a narcotic pain medication in addition to ketorolac    In Recovery, you will receive additional IV pain medication as needed and then you will be switched over to things you can take by mouth in preparation for going home.   Fentanyl   Hydromorphone (Dilaudid )    Common side effects: sleepiness, dizziness, upset stomach, constipation   Narcotic medications can alter your judgment, coordination and reaction time.  Do NOT drive or do anything that requires clear thinking and coordination until you have been completely off narcotic medications for at least 24 hours.    Use only to treat moderate to severe pain.  Ultram (Tramadol)  This is a non-narcotic prescription pain medication used for moderate to severe pain if needed.  Immediate release tablets can be cut or crushed.  Tramadol can cause or worsen seizures. Your risk of seizures is higher if you're taking other certain medications. These drugs include other  opioid pain drugs or certain medications for depression, other mood disorders, or psychosis.  Tramadol oral tablet may cause drowsiness. You should not drive, use heavy machinery, or perform any dangerous activities until you know how this drug affects you.  Acetaminophen (Tylenol)   This will be giving by IV to start and then by mouth once you are ready  Use for mild pain or discomfort  Available without a prescription and comes in liquid, tablets, capsules and powder.  The adult strength powder packs can be helpful right after surgery and can be found online.  Maximum daily dosage: 3,000mg per 24 hours  Works best as a scheduled dose for the first three days once you get home in addition to the gabapentin as the inflammation goes down  Gabapentin  Used together with the acetaminophen to provide pain relief.  Can be taken every 8 hours as needed  The medication works by calming the nerve cells that transmit pain signals to the brain.  This is best in liquid form right after surgery but can have a unpleasant taste.    * Important Reminder: Do NOT take NSAID or aspirin medications that are available without a prescription (examples: Ibuprofen, Motrin , Advil , Aleve , Naproxen)      OPERATING ROOM    Hover Mat will be used to move you from one surface to another.    Norma Hugger Gowns/Blankets are used to keep you warm.    Urinary catheters are not usually needed.    Surgery takes 30 minutes-3 hours depending on the procedure.    *Remember: How you go to sleep tends to be how you wake up - so pleasant thoughts are important!    RECOVERY ROOM    The surgeon will give an update to your family or support people as you are on your way to the recovery room.    You will wake up with a blood pressure cuff, oxygen and pulse monitor (pulse oximeter).    Frequent vital signs.    Pain Scale.      HOSPITAL ROOM    You will stay overnight on Patient Care Unit P2.    You will have a private room.    [facility] has been awarded an  "\"American College of Surgeons Center of Excellence\" partnered with the American Society for Metabolic and Bariatric Surgery and has a proven track records for quality care and good outcomes.     The staff has specialty training in the care of weight-loss surgery patients.       WHAT TO EXPECT AFTER SURGERY    You will start walking the day of surgery and continue several times a day, especially once you get home.    Drinking and eating will follow plan discussed with the dietitian.    Shower in the hospital.    Incentive Spirometer use and deep breathing/cough    Splinting your incision and wearing the abdominal binder when moving may help with discomfort.    Discharge the day after surgery is expected for laparoscopic procedures after you are seen by your bariatric surgery team.    A nurse from the clinic will call you within 3 days after discharge.      ONCE YOU ARE HOME CHECKLIST      Continue your liquid intake daily and track     Oral intake:      12-4pm         4pm-8pm         8pm-Midnight         6am-10am       2.   Walks      12-4pm      4pm-8pm      8pm-Midnight      6am-10am       3.   Incentive Spirometer/ Deep Breaths and Coughing      ACTIVITY AFTER SURGERY    Walking several times a day, increasing endurance and energy level over time.    No lifting over 20 lbs. for the first 2 weeks (6 weeks for open procedure) and then let your body be your guide.    Can be the    in terms of chores at home.    Pushing and pulling uses the same core muscles and those activities may cause pain or discomfort.    Do each exercise 10-15 times, twice a day and increase weight when you can consistently do 12 repetitions of activity.    No swimming, bathing, or hot tub use until incisions are completely healed (scars).    Do not plan to fly or take a road trip within 1 to 3 months after surgery.    See handouts below for exercises that can be done after surgery.    Seated Exercises for Arms and Legs (can be " done before or after surgery)  [email or web address]    Exercise Guidelines after Weight Loss Surgery (1st 4-6 weeks)  [email or web address]    Exercises after Weight Loss Surgery (strengthening, when no weight lifting restrictions - after 4-6 weeks)  [email or web address]      MEDICATIONS AFTER SURGERY    Here is a simple graph that might help makes things more clear for when to start certain medications after surgery.  (Zofran, Tylenol, Gabapentin and Hyoscyamine will be sent home with you from the hospital as needed)    Medication Dose When to Start   Vitamin B12  1000 mcg Once a day under the tongue (sublingual), weekly nasal spray, or monthly injections Day after surgery   Chewable Multivitamin with   18 mg Iron  (Must also contain Vitamin A and Zinc)  NO GUMMIES 1 tablet twice daily Day after surgery   Omeprazole  20 mg 1 capsule daily - open and sprinkle on food or swallow with fluid Day after surgery. Take even if no acid reflux symptoms.   Zofran 4 mg  (if ordered) 4mg tablet every 8 hours as needed for nausea As needed after surgery   Tylenol 1000 mg every 6 hours for three days after surgery.  After first three days after surgery, switch to as needed and do not take more than 3000mg daily Once you get home   (you will be sent home from the hospital with this)   Hyoscyamine  (if ordered) 0.125 mg tablet every 4 hours as needed for stomach spasm pain As needed after surgery   Liquid Gabapentin  Must be kept in fridge   250 mg liquid 3 times a day for at least 3 days after surgery Once you get home   (you will be sent home from the hospital with this)   Actigall (Ursodiol)- 300 mg for gallbladder if you still have Twice daily - swallow whole with warm water Two weeks after surgery   Chewable Calcium Citrate 2 tablets twice daily Three weeks after surgery   Vitamin D - (125 mcg)  5000 units 1 daily Three weeks after surgery        LIFELONG VITAMINS:    First 3 Months after  Surgery  Choose chewable, liquid or crushable forms of Multivitamin and Calcium Citrate and then you can switch to tablets you can swallow whole if you would like.    1.)   Sublingual Vitamin B12: Take 0586-5140 mcg 1 time daily    Also available in injectable form monthly.  Discuss with provider if interested.  2.)   Multivitamin with Iron: Take 1 multivitamin 2 times daily  Needs 18 mg of IRON per dose along with Vitamin A and Zinc in them  Generic Children's Chewable multivitamin with iron (Equate, Up & Up brand, etc.)  Centrum   Chewable  Centrum, Women's One-A-Day or Generic (after 3 months)  NO GUMMY Vitamins (these do not contain iron)  3.)   Calcium Citrate with Vitamin D: Take 400-600 mg 2 times daily (Start 3 weeks after surgery)  Bariatric Advantage: Citrate Lozenges (500mg)--2 Daily, or Chewy Bites (250 mg)--4 Daily  www.[employer]  or 1-928.941.3539  Celebrate Calcium: Calcium Plus 500 (500mg)--2 Daily, or Calcet Creamy Bites (500 mg)--2 Daily, or Calcium Citrate Chews (250mg) - 4 Daily   www.[employer]  or 1-873.996.7622  UNJURY Opurity: Calcium Citrate Plus (300mg)--3 Daily  www.[employer]  or 1-792.549.4027  Up-Jose D: Nutrition Direct: Flavorless Calcium Powder (500 mg with 250 IU Vitamin D)  www.amazon.com  or 1-611.458.1430--3 Scoops Daily  Wellesse Liquid Calcium Citrate--1 Tbsp.  (500 mg)--2 Times Daily  [employer]  After 3 months post op:  Citracal Petites (or Generic version) (200mg) - 4 daily  Any grocery store or Pharmacy  4.)   Vitamin D3 : Take 5000 IU Daily (Start 3 weeks after surgery)  This can remain a small gel cap    AFTER Surgery Medication Considerations:    1.  Cutting or crushing meds:  Before surgery, tablet size does not matter.  After surgery there will be swelling around your new stomach pouch or sleeve.  Therefore, it is important to limit the size of medications for the first six weeks after surgery.      What this means for you:  For the  first six weeks after surgery, you will need to cut or crush tablets that are larger than   inch (about the size of an eraser on a standard pencil)  Some capsules may be opened and the contents sprinkled onto soft food.  Once sprinkled on food, eat immediately being careful not to chew.    You will be given a pill cutter at the hospital  Refer to your medication list. This list will tell you which medications can be cut or crushed, and which capsules can be opened.     * Important Reminder: You will be given specific instructions in the hospital before discharge reminding you of the plan for your home medications.    2.  Long acting medications are not the best option anymore  You may have to switch to immediate release medications for the first 6 weeks depending on the medication.  Many types of bariatric surgery involves removing a portion of the small intestine. Long acting or extended release medications release slowly throughout the entire small intestine. Because your surgery has changed your stomach and/or intestine, you may not get the full effect of the long acting medication.  Short acting medications may work better for you after surgery.     3.  Avoid NSAIDs for the rest of your life  NSAIDs are Non-Steroidal Anti-Inflammatory Drugs  The NSAID class of medications is used to relieve minor aches, pains or to treat fever. They are commonly used to treat pain caused by a cold, flu, sore throat, headache, and arthritis.  Some NSAIDs are available over-the-counter while others need a prescription from your doctor.     NSAIDs should be avoided after bariatric surgery because they can cause stomach ulcers, scarring or bleeding.  You will not be able to take any NSAID mediation for the rest of your life after bariatric surgery.  Below is a list of common NSAID medications:    OVER-THE-COUNTER NSAIDs    Ibuprofen  - Brand names Advil   , Motrin  , Nuprin     Naproxen - Brand names Aleve  , Naprosyn  , Anaprox       PRESCRIPTION NSAIDs   Celebrex   (Celecoxib)                                       Orudis   (Ketoprofen)  Mobic   (Meloxicam)                                           Lodine   (Etodolac)  Voltaren  (Diclofenac)                                        Ansaid   (Flurbiprofen)  Relafen   Nabumetone                                       Clinoril   (Sulindac)  Feldene   (Piroxicam)                                         Tolectin   Tolmentin  Indocin   (Indomethacin)                                    Daypro  (Oxaprozin)       After surgery, the only safe non-prescription pain reliever is acetaminophen (Tylenol ).  Acetaminophen is available in 325mg and 500mg tablets, liquid and powder packs.  If acetaminophen does not control your pain, call your primary care provider to discuss other options.    4. Other medications you may have to avoid  Aspirin  Do not use aspirin for headaches, body aches, or muscle aches after bariatric surgery.  This is because aspirin can also cause stomach ulcers.  However, your primary care provider may tell you to take aspirin for certain conditions.  A maximum of 81mg of enteric coated aspirin (ex: Ecotrin ) once daily is usually okay to take if directed to take aspirin by your primary care doctor.   Be sure to take with food or milk.    Alendronate (Fosamax ), risedronate (Actonel ) (risedronate), ibandronate (Boniva ):  These are common osteoporosis medications that can cause erosions or ulcers in the esophagus and stomach.   Remind your primary care provider that you have had bariatric surgery and discuss other medication options.    Diuretics ( water pills )  These medications are used to treat high blood pressure.  They can also reduce swelling and water retention caused by various medical conditions.  Diuretics should not be used for patients who are having weight loss surgery.  Your diuretic will not be restarted immediately after surgery.  This is because it is often difficult to  drink enough fluids after surgery to keep up with the fluid loss caused by the diuretic/ water pill .  Call your primary care provider to discuss alternative medications to treat high blood pressure.      5. Medications for chronic conditions  It is likely that the need for some of your medications will change after weight loss surgery.    High Blood Pressure Medications   As you lose weight, your blood pressure may change. Talk with your primary care provider about how to manage your high blood pressure after surgery. You may need the dose of your blood pressure medication(s) adjusted.  Keep track of your blood pressure, and call your primary doctor if you have low blood pressure symptoms, such as: dizziness, faintness, weakness, light-headedness (especially when going from sitting to standing)    Diabetes Medications  As you lose weight, your blood sugars may change. Talk with your primary care provider about how to monitor and manage your diabetes after surgery. You may need the dose of your diabetes medication(s) adjusted.  Call your primary doctor if you have any of these symptoms.  Keep track of your blood sugars, and call your primary doctor if you have low blood sugar symptoms, such as: sweating, shaking, nervousness, headache, light-headedness, dizziness, weakness, or fainting    6. Other information    Medication Absorption  Some medications may not be absorbed as well after bariatric surgery.  Watch for changes in symptoms.  It is important to discuss your medications with your doctor if you think your medications are not working as well as they were before.  You may need to have medication doses adjusted.    Non-prescription medications:  You may need medication for seasonal allergies, colds, headaches, or minor aches and pains.  It is important to read the package label carefully.  Below are some helpful hints for choosing the right medication:   Look at the active ingredient(s) list to be sure the  medication does not contain caffeine, NSAIDs or aspirin (maximum aspirin dose: 81 mg daily).  It is okay to use liquid medications with the following cautions:  Watch the sugar concentration.  High sugar content in medications could cause dumping syndrome.  Diluting the liquid medication with an equal amount of water will help prevent dumping syndrome.  Do not use products that contain high fructose corn syrup, corn syrup, sugar, or sorbitol.  Look for sugar-free products as an alternative.  Chewable tablets or tablets that dissolve on your tongue ( oral-dissolving tablet ) are generally safe to use.      After Bariatric Surgery Discharge Instructions  [facility]     Note: Ask your nurse to order your medications from the pharmacy. Be sure you have your medications with you when you leave.    What should my diet be for the first 2 weeks after surgery?  Follow the bariatric diet the dietitian discussed with you.    How much fluid should I drink?  Strive for 48-64 ounces daily.  Carry a water bottle with you without a straw or sports top. Drink from it often.  Keep track of how much fluid you drink in a day.  Remember:  -Do not use straws, chew gum or suck on hard candies. They may cause painful gas.    -Sip, don't slurp when you drink.    -Practice small sips using a medicine cup for the first week postop.   -No ice or cold drinks. This could cause gas or spasms.   -No coffee, soda pop or drinks with caffeine. These may cause stomach pain.   -No alcohol. It is bad for your liver and will cause stomach pain.    How often should I do my deep breathing and coughing?  Use your incentive spirometer (small plastic breathing device) every hour while awake after you get home. Using the incentive spirometer helps you deep breath. Continuing to cough and deep breath will help prevent fevers and pneumonia.   If you do not have a fever after one week, you can stop using the incentive  spirometer and discard it.     You can continue to take deep breaths without the incentive spirometer every one to two hours while awake for the month after surgery.    What kinds of activity can I do?  Get plenty of rest the first few days after surgery and try to balance rest and activity. You will need some time to recover - you may be more tired than you realize at first. You'll feel better and heal faster if you take good care of yourself.  For 2 weeks after surgery (Please review restrictions at your two week visit, they could change based on how well you are doing):   -Don't lift more than 20 pounds.   -Take 4-5 short walks every day.  -Don't jog, run, or do belly exercises.  Don't swim, bathe or use a hot tub until your cuts are healed (scabs are gone).    You may shower right away after surgery.  Don't plan to fly or take a road trip within the first 1 to 3 months after surgery.  You could get a blood clot in your legs. If you must travel, get up and move around every hour for at least 5 minutes before continuing on your journey.  Your care team can help you decide when it's safe for you to travel.     What can I do for pain control?  You had major belly surgery that involves all layers of your belly muscles. Pain is expected, even for some as far out as 6-8 weeks after surgery. Moving, sneezing, coughing, and breathing will cause discomfort because these activities use your belly muscles.   Please see your after visit summary medication review for what pain medication will be continued, discontinued and newly started for you.    You can take opioid pain medicine if prescribed and if needed. Try to wean off from it as soon as you feel comfortable.   Do not drive while you are taking opioid pain medicine. This is dangerous.  The first three days after surgery, take your acetaminophen (Tylenol) scheduled every 6 hours.  After that you can take it as needed.  -Acetaminophen formulation options:   -Liquid    -Caplet (Cut caplet in half before taking)   -We will have you on a higher dose of Tylenol for the first three days post-op but then but then you should not exceed 3000mg per day.  -You will also take Tylenol for pain in place of the opioid pain medicine (check with your care team for specifics).   You can apply ice or heat to the affected area(s). Just remember to wrap the ice in something and limit icing sessions to 20 minutes. Excessive icing can irritate the skin or cause skin damage.  You can apply heat with a hot, wet towel or heating pad. Just like cold therapy, limit heat application to 20 minutes. Never sleep with a heating pad on. It could cause severe burns to your skin.  Wear your binder to support your belly muscles if you have one.  Take this off a little more each day and try to be off completely by 2 weeks after surgery. If you don't need your binder for comfort or support, you don't need to wear it.   You may not be able to sleep in a comfortable position for a few weeks after surgery. This is normal. You may be more comfortable sleeping in a recliner or propped up with 3 pillows for the first couple of weeks after surgery.  You may feel gas pains in the upper chest or between your shoulder blades from the laparoscopic surgery which should get better with walking around, warm/cold packs and pain medication.  Do not take NSAID's (Non-Steroidal Anti-Inflammatory Drugs) (examples: ibuprofen, Motrin, Advil, Aleve, and Naproxen), aspirin, or use pain patches with NSAID's. They will increase your risk of bleeding or getting an ulcer.    Please call the clinic for any of the following pain concerns, we would like to talk to you:  -pain that does not improve with rest  -pain that gets worse and worse  -pain that is not controlled by your pain medicine  -a sudden severe increase in pain    What medications will I need to take after surgery?  You may be discharged with the following types of medications:  antacids, pain medications, anti-nausea medications, etc.  Please see your after visit summary medication review for what will be continued, discontinued and newly started.  It is important to reduce the amount of acid in your new stomach for a couple of months after surgery while it is still healing. We will prescribe an acid reducer or antacid. Take it as directed. This will help prevent ulcers, heartburn and acid reflux.  If you took an acid reducer before you had surgery, your care team will let you know what acid reducer you will take after surgery.   It is okay to swallow any medications smaller than   inch in size.   -If it is larger than   inch, it may need to be cut, crushed, or in a liquid form. See the above medication section for what is most appropriate for you and your medications.    What should I know about my incisions (cuts)?  Your incisions are covered with white steri strips or butterfly tape and have band aids or gauze over the top. If you have gauze or band aids, they can come off in the hospital.  Leave your steri strips on until they fall off on their own. If the steri strips don't fall off after 1 week, you can take them off. If they fall off earlier, replace them with clean band aids trying to avoid touching the incision itself.   If you have gauze covered by a clear dressing, remove 2-3 days after surgery or as directed by your care team.  You may shower in the hospital after surgery and can get your incision coverings wet.  Do not submerge in water (e.g. No baths, swimming pools, hot tubs) until your incisions are completely healed (scabs are gone).    Call the clinic if you have any of these signs or symptoms of infection:  -Redness around the site.  -Drainage that smells bad.  -Drainage that is thick yellow or green.  -An increase in pain around the incision site  -An increase in swelling around the incision site  -Heat or warmth around incision site   -Fever of 101.5  F (38.3  C) or  higher when taken under the tongue.    -Chills    Will my urine or bowel movements change?   Your first bowel movements (stools) will likely be liquid. You may also notice old blood or a darker color (black or maroon color) in your bowel movements.  This is not unusual and usually goes away after the first week, if not sooner. You may not have a bowel movement for a week.   If you have not had a bowel movement for at least three days after your surgery date and are passing gas, you can use over the counter stool softeners.  Please stop taking the stool softeners and laxatives if your stools are loose.  Increasing fluids and activity as well as getting off narcotics will help prevent constipation.    Call the clinic if:   -You have stomach pain.   -You continue to have constipation.  -You have excessive bloating after walking and passing gas.  -You are having 3 or more loose stools a day.  You may have an infection that we need to treat.    How can I prevent dehydration if I feel nauseated (sick to my stomach) and vomit (throw up)?   Vomiting is not normal after surgery. If you continue to have nausea and vomiting, call the clinic.   Nausea can be a sign of dehydration. That is why it is very important to track your fluids. Do not nap more than one hour during the day. Set a timer to wake yourself up, if needed. Too much sleep will keep you from drinking enough fluid during the day and lead to dehydration.  No outside activity in hot, humid weather until you can drink 48 to 64 ounces of fluid in 24 hours. If you sweat a lot, your body may lose too much water.  If having difficulty getting in your fluids, try to take a 1 ounce sip of water (one medicine cup) every 15 minutes.  Set a timer to remind yourself.    If you notice any of the following symptoms, please don't delay in calling the clinic.  Early identification gives us more options for treatment:  -Dark colored urine  -Urinating (pass water) less than 2-3  "times per day  -Lack of energy  -Nausea  -Dizziness  -Headache  -Metallic taste in your mouth    Call the clinic ANY TIME at [phone] if:  -Your pain medicine is not working.  -You have a fever ? 101.5 F.  -You have belly or left shoulder pain that gets worse and worse.  -You have a swollen leg with redness, warmth, or pain behind the knee or calf.  -You have chest pain   -You feel very short of breath.  -You have a sudden severe increase in heart rate.  -You have vomiting that gets worse and worse.  -You have constant nausea (feeling sick to your stomach) that does not go away with medication.  -You have trouble swallowing.  -You have an increasing feeling that \"something is not right\".  -You have hiccups that do not stop.  -You have any questions or concerns.    If you have to go to the Emergency Room, we prefer you go to the hospital that did your surgery. Please let them know that you had bariatric surgery and to notify your surgeon.    When should I go back to the clinic?  Follow up with your care team in 1 week.   If this appointment was not already made, please call: [phone]    IMPORTANT PHONE NUMBERS:    Bariatric Nurse line (M-F 8am to 4:30pm) = [phone]  Main line (after hours/holidays/weekends) = [phone]    These are some additional handouts that are very important to read through and use after surgery.  They are good tools for after your surgery as you recover.      After Your Weight Loss Surgery  [email or web address]      Mindfulness Meditation  [email or web address]    Conscious Breathing  [email or web address]    Keeping Track of Your Fluids (for after surgery)  [email or web address]      AFTER SURGERY APPOINTMENT SCHEDULE    1 week with Dietitian (AYDEE)  Dietitian Virtual Group Class scheduled for Tuesday 8/5/2025 from 1-2 pm with one of the dietitians. She will send you an email invitation to join the week of this class and the " purpose of it is to check in with you and give you the next set of dietary instructions.  It will be using [employer] Teams, NOT in person or through [employer].    2 weeks with Surgeon  Surgeon video follow-up visit that will be done through [employer] which is already scheduled for you.    1 month with RD    3 months with RD    3 months with Psychologist    6 months with Bariatrician with labs    9 months with RD     12 months with Bariatrician with labs    18 months with RD or Bariatrician-possible labs    Annually after that with Bariatrician with labs and RD if needed      POP QUIZ    1.  How long do you need to be on full liquids after surgery? ________    2.  Name the 4 vitamin/mineral supplements you ll need to take for the rest of your life.    __________  _________  _________  __________    3.  How many grams of protein should you get in a day? ________    4.   Which meal would give you the most protein?    a.   1 oz. Cheese on 1 saltine cracker and 3 Tbsp applesauce.    b.     cup mashed potatoes and gravy with 2 Tablespoons applesauce    c.   3 Reduced Fat Wheat Thins, 1 oz. green beans, and 2 peeled grapes     5.   Concentrated urine, lack of energy, nausea, dizziness, headache, and a bad taste in your mouth are signs of:    a. Dumping    b.  Dehydration   c. Clogging    d. None of the above    6.     What is the minimum amount of time recommended for exercise?    a.  30 minutes 7 days a week    b.  30 minutes 3 days per week    c.   1 hour 5 days per week    d.  None of the above    7.     Drinking liquids with meals can cause:    a.  Vomiting     b.   Weight gain   c.   Desire to Snack    d.   All of the above    8.     The long-term success of my weight loss surgery depends on:    a.      Me      b.   My Surgeon     c.  My Support Group     d.  My Family    9.      If you receive ice, carbonation or straws in the hospital post-op, you should:    a.      Eat and drink whatever is given to you by the hospital  staff    b.      Remind the hospital staff you are not to have ice, straws or carbonation.    c.      Take the ice but not the carbonation or straws    d.      Call 911    10.  Name two possible complications after bariatric surgery:    ________________________   ______________________    11. Name two habits of healthy bariatric surgery patients:    _______________________  ________________________    True or False    12.  This operation for obesity will require routine visits with my surgeon for the first year, and then I will be okay on my own once I lose my weight and change my diet.    13.  Obesity is a disease that can be managed with a variety of tools.  However, some individuals fail to lose weight or regain their weight because they resume snacking, binging, take in high fat or carbohydrate food & lack sufficient liquids and exercise.    14.  Women should avoid becoming pregnant for at least 18 months to 2 years following bariatric surgery.    15.  I can still drink 2-3 cans of soda or carbonated juices, water or other beverages after my surgery, as long as it is in moderation.    16.   Re-operation is sometimes necessary due to bleeding, hernias, ulceration, breakdown of stitches or staples, leakage and blockage of the intestines.    17.   I should call the clinic if I develop increased redness or swelling in or around my incision, an oral temperature of 101.5 or greater, increasing severe abdominal or shoulder pain, increasing heart rate or anytime I just don t feel right.    18.   Gaining weight prior to surgery is dangerous because it can enlarge the liver, increase surgical risk and extend your recovery period.    19.   Once I lose my weight, I can drink alcohol as long as it is in moderation.    20.  It is possible I will have more emotional difficulties after surgery.    21.  I can take Aleve but not Ibuprofen or Aspirin after surgery.    22.  If my primary doctor tells me to stop my vitamin 12  because my labs say it's too high, I should call my bariatric clinic to confirm.    23.  Dumping Syndrome only occurs in gastric sleeve patients.    24.  It is possible you will gain weight or not lose much weight immediately after surgery.       We wish you the best and please let us know if you have any questions or concerns!    Brigida Badillo RN and Shila Miller RN    [facility]  [address], [address]  Phone: [phone]  Fax:  [phone]

## 2025-07-09 NOTE — PROGRESS NOTES
Patient attended group class to review pre-op instructions for upcoming surgery.    Discussed admission process, COVID restrictions and hospital course.  Pharmacy information packet given and explained. Patient was given exercises to work on post-op for maintaining muscle mass and strengthening.  Bariatric quiz reviewed in class. Discharge instructions and follow up appointments given and reviewed with pt at this time and patient verbalized understanding. He had his H&P on 7/3/2025 with Valentina Velarde CNP and did his pre-op testing at [facility].  Prescriptions for Omeprazole and Actigall as well as vitamins sent to the patient's pharmacy to be started after surgery.      Shila Miller RN, CBN  [facility]  P [phone]  F [phone]

## 2025-07-09 NOTE — PROGRESS NOTES
Pt attended the pre-surgery class for bariatric surgery class and was educated on the pre and post surgery liquid diets. Patient received information via [employer] for the pre-op liquid diet and the post-op liquid diet. Discussed appropriate liquids and discussed portions. Reviewed appropriate calories, protein, and fluid goals for each stage before and after surgery. Educated on correct vitamins/minerals, dosage, and frequency to take after surgery. Provided grocery list and sample menu plan for each diet stage.      Pt will begin pre-op liquid diet 7/15/2025 and will do clear liquids the day before surgery. Pt is scheduled for LSG on 7/29/2025 and will then follow 1 week post-op liquid diet. Pt will follow up with RD 1-week post-op for education on the pureed and soft/regular diet stages.    Marcela Diaz RD

## 2025-07-10 LAB
ANABASINE UR-MCNC: <5 NG/ML
COTININE UR-MCNC: <15 NG/ML
NICOTINE UR-MCNC: <15 NG/ML
TRANS-3-OH-COTININE UR-MCNC: <50 NG/ML

## 2025-07-28 ENCOUNTER — ANESTHESIA EVENT (OUTPATIENT)
Dept: SURGERY | Facility: HOSPITAL | Age: 45
End: 2025-07-28
Payer: COMMERCIAL

## 2025-07-29 ENCOUNTER — HOSPITAL ENCOUNTER (INPATIENT)
Facility: HOSPITAL | Age: 45
LOS: 1 days | Discharge: HOME | End: 2025-07-30
Attending: SURGERY | Admitting: SURGERY
Payer: COMMERCIAL

## 2025-07-29 ENCOUNTER — ANESTHESIA (OUTPATIENT)
Dept: SURGERY | Facility: HOSPITAL | Age: 45
End: 2025-07-29
Payer: COMMERCIAL

## 2025-07-29 DIAGNOSIS — Z98.84 S/P LAPAROSCOPIC SLEEVE GASTRECTOMY: ICD-10-CM

## 2025-07-29 DIAGNOSIS — E66.01 MORBID OBESITY WITH BMI OF 40.0-44.9, ADULT (H): Primary | ICD-10-CM

## 2025-07-29 LAB
GLUCOSE BLDC GLUCOMTR-MCNC: 128 MG/DL (ref 70–99)
GLUCOSE BLDC GLUCOMTR-MCNC: 214 MG/DL (ref 70–99)

## 2025-07-29 PROCEDURE — 88305 TISSUE EXAM BY PATHOLOGIST: CPT | Mod: 26 | Performed by: PATHOLOGY

## 2025-07-29 PROCEDURE — 999N000141 HC STATISTIC PRE-PROCEDURE NURSING ASSESSMENT: Performed by: SURGERY

## 2025-07-29 PROCEDURE — 250N000025 HC SEVOFLURANE, PER MIN: Performed by: SURGERY

## 2025-07-29 PROCEDURE — 258N000003 HC RX IP 258 OP 636: Performed by: ANESTHESIOLOGY

## 2025-07-29 PROCEDURE — 250N000013 HC RX MED GY IP 250 OP 250 PS 637: Performed by: NURSE PRACTITIONER

## 2025-07-29 PROCEDURE — 250N000009 HC RX 250: Performed by: NURSE ANESTHETIST, CERTIFIED REGISTERED

## 2025-07-29 PROCEDURE — 250N000011 HC RX IP 250 OP 636: Performed by: SURGERY

## 2025-07-29 PROCEDURE — 88305 TISSUE EXAM BY PATHOLOGIST: CPT | Mod: TC | Performed by: SURGERY

## 2025-07-29 PROCEDURE — 250N000009 HC RX 250: Performed by: SURGERY

## 2025-07-29 PROCEDURE — 250N000011 HC RX IP 250 OP 636: Performed by: ANESTHESIOLOGY

## 2025-07-29 PROCEDURE — 360N000077 HC SURGERY LEVEL 4, PER MIN: Performed by: SURGERY

## 2025-07-29 PROCEDURE — 370N000017 HC ANESTHESIA TECHNICAL FEE, PER MIN: Performed by: SURGERY

## 2025-07-29 PROCEDURE — 250N000013 HC RX MED GY IP 250 OP 250 PS 637: Performed by: SURGERY

## 2025-07-29 PROCEDURE — 120N000001 HC R&B MED SURG/OB

## 2025-07-29 PROCEDURE — 258N000003 HC RX IP 258 OP 636: Performed by: NURSE PRACTITIONER

## 2025-07-29 PROCEDURE — 710N000009 HC RECOVERY PHASE 1, LEVEL 1, PER MIN: Performed by: SURGERY

## 2025-07-29 PROCEDURE — 250N000011 HC RX IP 250 OP 636: Performed by: NURSE PRACTITIONER

## 2025-07-29 PROCEDURE — 258N000003 HC RX IP 258 OP 636: Performed by: NURSE ANESTHETIST, CERTIFIED REGISTERED

## 2025-07-29 PROCEDURE — 250N000011 HC RX IP 250 OP 636: Performed by: NURSE ANESTHETIST, CERTIFIED REGISTERED

## 2025-07-29 PROCEDURE — 272N000001 HC OR GENERAL SUPPLY STERILE: Performed by: SURGERY

## 2025-07-29 RX ORDER — AMLODIPINE BESYLATE 5 MG/1
5 TABLET ORAL DAILY
Status: CANCELLED | OUTPATIENT
Start: 2025-07-30

## 2025-07-29 RX ORDER — MORPHINE SULFATE 1 MG/ML
INJECTION, SOLUTION EPIDURAL; INTRATHECAL; INTRAVENOUS
Status: COMPLETED | OUTPATIENT
Start: 2025-07-29 | End: 2025-07-29

## 2025-07-29 RX ORDER — TRAMADOL HYDROCHLORIDE 50 MG/1
100 TABLET ORAL EVERY 6 HOURS PRN
Status: DISCONTINUED | OUTPATIENT
Start: 2025-07-29 | End: 2025-07-30 | Stop reason: HOSPADM

## 2025-07-29 RX ORDER — MULTIVIT-MIN/FOLIC/VIT K/LYCOP 400-300MCG
1 TABLET ORAL 2 TIMES DAILY
Status: CANCELLED | OUTPATIENT
Start: 2025-07-30

## 2025-07-29 RX ORDER — CEFAZOLIN SODIUM/WATER 3 G/30 ML
3 SYRINGE (ML) INTRAVENOUS SEE ADMIN INSTRUCTIONS
Status: DISCONTINUED | OUTPATIENT
Start: 2025-07-29 | End: 2025-07-29 | Stop reason: HOSPADM

## 2025-07-29 RX ORDER — HYDROMORPHONE HCL IN WATER/PF 6 MG/30 ML
0.4 PATIENT CONTROLLED ANALGESIA SYRINGE INTRAVENOUS EVERY 5 MIN PRN
Status: DISCONTINUED | OUTPATIENT
Start: 2025-07-29 | End: 2025-07-29 | Stop reason: HOSPADM

## 2025-07-29 RX ORDER — NALOXONE HYDROCHLORIDE 0.4 MG/ML
0.2 INJECTION, SOLUTION INTRAMUSCULAR; INTRAVENOUS; SUBCUTANEOUS
Status: DISCONTINUED | OUTPATIENT
Start: 2025-07-29 | End: 2025-07-30 | Stop reason: HOSPADM

## 2025-07-29 RX ORDER — HEPARIN SODIUM 5000 [USP'U]/.5ML
5000 INJECTION, SOLUTION INTRAVENOUS; SUBCUTANEOUS ONCE
Status: COMPLETED | OUTPATIENT
Start: 2025-07-29 | End: 2025-07-29

## 2025-07-29 RX ORDER — LOSARTAN POTASSIUM 50 MG/1
100 TABLET ORAL DAILY
Status: CANCELLED | OUTPATIENT
Start: 2025-07-30

## 2025-07-29 RX ORDER — ONDANSETRON 4 MG/1
4 TABLET, ORALLY DISINTEGRATING ORAL EVERY 6 HOURS PRN
Status: DISCONTINUED | OUTPATIENT
Start: 2025-07-29 | End: 2025-07-30 | Stop reason: HOSPADM

## 2025-07-29 RX ORDER — SODIUM CHLORIDE, SODIUM LACTATE, POTASSIUM CHLORIDE, CALCIUM CHLORIDE 600; 310; 30; 20 MG/100ML; MG/100ML; MG/100ML; MG/100ML
INJECTION, SOLUTION INTRAVENOUS CONTINUOUS
Status: DISCONTINUED | OUTPATIENT
Start: 2025-07-29 | End: 2025-07-29 | Stop reason: HOSPADM

## 2025-07-29 RX ORDER — FENTANYL CITRATE 50 UG/ML
25 INJECTION, SOLUTION INTRAMUSCULAR; INTRAVENOUS EVERY 5 MIN PRN
Status: DISCONTINUED | OUTPATIENT
Start: 2025-07-29 | End: 2025-07-29 | Stop reason: HOSPADM

## 2025-07-29 RX ORDER — ONDANSETRON 2 MG/ML
INJECTION INTRAMUSCULAR; INTRAVENOUS PRN
Status: DISCONTINUED | OUTPATIENT
Start: 2025-07-29 | End: 2025-07-29

## 2025-07-29 RX ORDER — SODIUM CHLORIDE 9 MG/ML
INJECTION, SOLUTION INTRAVENOUS CONTINUOUS PRN
Status: DISCONTINUED | OUTPATIENT
Start: 2025-07-29 | End: 2025-07-29

## 2025-07-29 RX ORDER — PROPOFOL 10 MG/ML
INJECTION, EMULSION INTRAVENOUS PRN
Status: DISCONTINUED | OUTPATIENT
Start: 2025-07-29 | End: 2025-07-29

## 2025-07-29 RX ORDER — BUPIVACAINE HYDROCHLORIDE AND EPINEPHRINE 2.5; 5 MG/ML; UG/ML
INJECTION, SOLUTION EPIDURAL; INFILTRATION; INTRACAUDAL; PERINEURAL PRN
Status: DISCONTINUED | OUTPATIENT
Start: 2025-07-29 | End: 2025-07-29 | Stop reason: HOSPADM

## 2025-07-29 RX ORDER — ACETAMINOPHEN 325 MG/1
975 TABLET ORAL ONCE
Status: COMPLETED | OUTPATIENT
Start: 2025-07-29 | End: 2025-07-29

## 2025-07-29 RX ORDER — NALOXONE HYDROCHLORIDE 0.4 MG/ML
0.4 INJECTION, SOLUTION INTRAMUSCULAR; INTRAVENOUS; SUBCUTANEOUS
Status: DISCONTINUED | OUTPATIENT
Start: 2025-07-29 | End: 2025-07-30 | Stop reason: HOSPADM

## 2025-07-29 RX ORDER — OMEPRAZOLE 20 MG/1
20 CAPSULE, DELAYED RELEASE ORAL
Status: DISCONTINUED | OUTPATIENT
Start: 2025-07-30 | End: 2025-07-30 | Stop reason: HOSPADM

## 2025-07-29 RX ORDER — EPHEDRINE SULFATE 50 MG/ML
INJECTION, SOLUTION INTRAMUSCULAR; INTRAVENOUS; SUBCUTANEOUS PRN
Status: DISCONTINUED | OUTPATIENT
Start: 2025-07-29 | End: 2025-07-29

## 2025-07-29 RX ORDER — FLUMAZENIL 0.1 MG/ML
0.2 INJECTION, SOLUTION INTRAVENOUS
Status: DISCONTINUED | OUTPATIENT
Start: 2025-07-29 | End: 2025-07-29 | Stop reason: HOSPADM

## 2025-07-29 RX ORDER — NALBUPHINE HYDROCHLORIDE 20 MG/ML
5 INJECTION INTRAMUSCULAR; INTRAVENOUS; SUBCUTANEOUS EVERY 4 HOURS PRN
Status: DISCONTINUED | OUTPATIENT
Start: 2025-07-29 | End: 2025-07-30 | Stop reason: HOSPADM

## 2025-07-29 RX ORDER — HYDROMORPHONE HCL IN WATER/PF 6 MG/30 ML
0.2 PATIENT CONTROLLED ANALGESIA SYRINGE INTRAVENOUS EVERY 5 MIN PRN
Status: DISCONTINUED | OUTPATIENT
Start: 2025-07-29 | End: 2025-07-29 | Stop reason: HOSPADM

## 2025-07-29 RX ORDER — DEXAMETHASONE SODIUM PHOSPHATE 4 MG/ML
4 INJECTION, SOLUTION INTRA-ARTICULAR; INTRALESIONAL; INTRAMUSCULAR; INTRAVENOUS; SOFT TISSUE
Status: DISCONTINUED | OUTPATIENT
Start: 2025-07-29 | End: 2025-07-29 | Stop reason: HOSPADM

## 2025-07-29 RX ORDER — GLYCOPYRROLATE 0.2 MG/ML
INJECTION, SOLUTION INTRAMUSCULAR; INTRAVENOUS PRN
Status: DISCONTINUED | OUTPATIENT
Start: 2025-07-29 | End: 2025-07-29

## 2025-07-29 RX ORDER — MORPHINE SULFATE 1 MG/ML
150 INJECTION, SOLUTION EPIDURAL; INTRATHECAL; INTRAVENOUS ONCE
Refills: 0 | Status: DISCONTINUED | OUTPATIENT
Start: 2025-07-29 | End: 2025-07-29 | Stop reason: HOSPADM

## 2025-07-29 RX ORDER — LIDOCAINE 40 MG/G
CREAM TOPICAL
Status: DISCONTINUED | OUTPATIENT
Start: 2025-07-29 | End: 2025-07-29 | Stop reason: HOSPADM

## 2025-07-29 RX ORDER — LORAZEPAM 2 MG/ML
.5-1 INJECTION INTRAMUSCULAR EVERY 6 HOURS PRN
Status: DISCONTINUED | OUTPATIENT
Start: 2025-07-29 | End: 2025-07-30 | Stop reason: HOSPADM

## 2025-07-29 RX ORDER — ACETAMINOPHEN 325 MG/1
975 TABLET ORAL EVERY 6 HOURS
Status: DISCONTINUED | OUTPATIENT
Start: 2025-07-29 | End: 2025-07-30 | Stop reason: HOSPADM

## 2025-07-29 RX ORDER — GABAPENTIN 300 MG/1
600 CAPSULE ORAL
Status: COMPLETED | OUTPATIENT
Start: 2025-07-29 | End: 2025-07-29

## 2025-07-29 RX ORDER — DEXAMETHASONE SODIUM PHOSPHATE 10 MG/ML
INJECTION, SOLUTION INTRAMUSCULAR; INTRAVENOUS PRN
Status: DISCONTINUED | OUTPATIENT
Start: 2025-07-29 | End: 2025-07-29

## 2025-07-29 RX ORDER — FENTANYL CITRATE 50 UG/ML
50 INJECTION, SOLUTION INTRAMUSCULAR; INTRAVENOUS EVERY 5 MIN PRN
Status: DISCONTINUED | OUTPATIENT
Start: 2025-07-29 | End: 2025-07-29 | Stop reason: HOSPADM

## 2025-07-29 RX ORDER — ENOXAPARIN SODIUM 100 MG/ML
40 INJECTION SUBCUTANEOUS EVERY 24 HOURS
Status: DISCONTINUED | OUTPATIENT
Start: 2025-07-29 | End: 2025-07-30 | Stop reason: HOSPADM

## 2025-07-29 RX ORDER — GABAPENTIN 250 MG/5ML
250 SOLUTION ORAL EVERY 8 HOURS SCHEDULED
Status: DISCONTINUED | OUTPATIENT
Start: 2025-07-29 | End: 2025-07-30 | Stop reason: HOSPADM

## 2025-07-29 RX ORDER — ACETAMINOPHEN 325 MG/10.15ML
975 LIQUID ORAL EVERY 6 HOURS
Status: DISCONTINUED | OUTPATIENT
Start: 2025-07-29 | End: 2025-07-30 | Stop reason: HOSPADM

## 2025-07-29 RX ORDER — ONDANSETRON 4 MG/1
4 TABLET, ORALLY DISINTEGRATING ORAL EVERY 30 MIN PRN
Status: DISCONTINUED | OUTPATIENT
Start: 2025-07-29 | End: 2025-07-29 | Stop reason: HOSPADM

## 2025-07-29 RX ORDER — NALOXONE HYDROCHLORIDE 0.4 MG/ML
0.1 INJECTION, SOLUTION INTRAMUSCULAR; INTRAVENOUS; SUBCUTANEOUS
Status: DISCONTINUED | OUTPATIENT
Start: 2025-07-29 | End: 2025-07-29 | Stop reason: HOSPADM

## 2025-07-29 RX ORDER — ACETAMINOPHEN 10 MG/ML
1000 INJECTION, SOLUTION INTRAVENOUS EVERY 6 HOURS
Status: DISCONTINUED | OUTPATIENT
Start: 2025-07-29 | End: 2025-07-30 | Stop reason: HOSPADM

## 2025-07-29 RX ORDER — PROCHLORPERAZINE MALEATE 10 MG
10 TABLET ORAL EVERY 6 HOURS PRN
Status: DISCONTINUED | OUTPATIENT
Start: 2025-07-29 | End: 2025-07-30 | Stop reason: HOSPADM

## 2025-07-29 RX ORDER — ENALAPRILAT 1.25 MG/ML
1.25 INJECTION INTRAVENOUS EVERY 6 HOURS PRN
Status: DISCONTINUED | OUTPATIENT
Start: 2025-07-29 | End: 2025-07-30 | Stop reason: HOSPADM

## 2025-07-29 RX ORDER — KETOROLAC TROMETHAMINE 30 MG/ML
15 INJECTION, SOLUTION INTRAMUSCULAR; INTRAVENOUS EVERY 6 HOURS
Status: DISCONTINUED | OUTPATIENT
Start: 2025-07-29 | End: 2025-07-30 | Stop reason: HOSPADM

## 2025-07-29 RX ORDER — FENTANYL CITRATE 50 UG/ML
25-100 INJECTION, SOLUTION INTRAMUSCULAR; INTRAVENOUS
Refills: 0 | Status: DISCONTINUED | OUTPATIENT
Start: 2025-07-29 | End: 2025-07-29 | Stop reason: HOSPADM

## 2025-07-29 RX ORDER — DEXTROSE MONOHYDRATE, SODIUM CHLORIDE, AND POTASSIUM CHLORIDE 50; 1.49; 4.5 G/1000ML; G/1000ML; G/1000ML
INJECTION, SOLUTION INTRAVENOUS CONTINUOUS
Status: DISCONTINUED | OUTPATIENT
Start: 2025-07-29 | End: 2025-07-30 | Stop reason: HOSPADM

## 2025-07-29 RX ORDER — LIDOCAINE 40 MG/G
CREAM TOPICAL
Status: DISCONTINUED | OUTPATIENT
Start: 2025-07-29 | End: 2025-07-30 | Stop reason: HOSPADM

## 2025-07-29 RX ORDER — HYOSCYAMINE SULFATE 0.12 MG/1
125 TABLET SUBLINGUAL EVERY 4 HOURS
Status: DISCONTINUED | OUTPATIENT
Start: 2025-07-29 | End: 2025-07-30 | Stop reason: HOSPADM

## 2025-07-29 RX ORDER — SODIUM CHLORIDE, SODIUM LACTATE, POTASSIUM CHLORIDE, CALCIUM CHLORIDE 600; 310; 30; 20 MG/100ML; MG/100ML; MG/100ML; MG/100ML
INJECTION, SOLUTION INTRAVENOUS CONTINUOUS PRN
Status: DISCONTINUED | OUTPATIENT
Start: 2025-07-29 | End: 2025-07-29

## 2025-07-29 RX ORDER — CEFAZOLIN SODIUM/WATER 3 G/30 ML
3 SYRINGE (ML) INTRAVENOUS
Status: COMPLETED | OUTPATIENT
Start: 2025-07-29 | End: 2025-07-29

## 2025-07-29 RX ORDER — LIDOCAINE HYDROCHLORIDE 10 MG/ML
INJECTION, SOLUTION INFILTRATION; PERINEURAL PRN
Status: DISCONTINUED | OUTPATIENT
Start: 2025-07-29 | End: 2025-07-29

## 2025-07-29 RX ORDER — ONDANSETRON 2 MG/ML
4 INJECTION INTRAMUSCULAR; INTRAVENOUS EVERY 6 HOURS PRN
Status: DISCONTINUED | OUTPATIENT
Start: 2025-07-29 | End: 2025-07-30 | Stop reason: HOSPADM

## 2025-07-29 RX ORDER — ONDANSETRON 2 MG/ML
4 INJECTION INTRAMUSCULAR; INTRAVENOUS EVERY 30 MIN PRN
Status: DISCONTINUED | OUTPATIENT
Start: 2025-07-29 | End: 2025-07-29 | Stop reason: HOSPADM

## 2025-07-29 RX ORDER — ONDANSETRON 2 MG/ML
4 INJECTION INTRAMUSCULAR; INTRAVENOUS
Status: COMPLETED | OUTPATIENT
Start: 2025-07-29 | End: 2025-07-29

## 2025-07-29 RX ADMIN — DEXTROSE, SODIUM CHLORIDE, AND POTASSIUM CHLORIDE: 5; .45; .15 INJECTION INTRAVENOUS at 09:39

## 2025-07-29 RX ADMIN — DEXAMETHASONE SODIUM PHOSPHATE 10 MG: 10 INJECTION, SOLUTION INTRAMUSCULAR; INTRAVENOUS at 07:53

## 2025-07-29 RX ADMIN — MIDAZOLAM HYDROCHLORIDE 2 MG: 1 INJECTION, SOLUTION INTRAMUSCULAR; INTRAVENOUS at 07:17

## 2025-07-29 RX ADMIN — FENTANYL CITRATE 50 MCG: 50 INJECTION INTRAMUSCULAR; INTRAVENOUS at 09:32

## 2025-07-29 RX ADMIN — PROPOFOL 250 MG: 10 INJECTION, EMULSION INTRAVENOUS at 07:33

## 2025-07-29 RX ADMIN — Medication 10 MG: at 08:00

## 2025-07-29 RX ADMIN — FENTANYL CITRATE 50 MCG: 50 INJECTION INTRAMUSCULAR; INTRAVENOUS at 08:57

## 2025-07-29 RX ADMIN — GLYCOPYRROLATE 0.3 MG: 0.2 INJECTION, SOLUTION INTRAMUSCULAR; INTRAVENOUS at 07:59

## 2025-07-29 RX ADMIN — FAMOTIDINE 20 MG: 10 INJECTION, SOLUTION INTRAVENOUS at 06:32

## 2025-07-29 RX ADMIN — FENTANYL CITRATE 50 MCG: 50 INJECTION INTRAMUSCULAR; INTRAVENOUS at 09:41

## 2025-07-29 RX ADMIN — HEPARIN SODIUM 5000 UNITS: 5000 INJECTION, SOLUTION INTRAVENOUS; SUBCUTANEOUS at 07:42

## 2025-07-29 RX ADMIN — ROCURONIUM 60 MG: 50 INJECTION, SOLUTION INTRAVENOUS at 07:33

## 2025-07-29 RX ADMIN — SODIUM CHLORIDE, SODIUM LACTATE, POTASSIUM CHLORIDE, AND CALCIUM CHLORIDE: .6; .31; .03; .02 INJECTION, SOLUTION INTRAVENOUS at 06:32

## 2025-07-29 RX ADMIN — PROPOFOL 50 MG: 10 INJECTION, EMULSION INTRAVENOUS at 08:07

## 2025-07-29 RX ADMIN — FENTANYL CITRATE 100 MCG: 50 INJECTION INTRAMUSCULAR; INTRAVENOUS at 07:32

## 2025-07-29 RX ADMIN — DEXTROSE, SODIUM CHLORIDE, AND POTASSIUM CHLORIDE: 5; .45; .15 INJECTION INTRAVENOUS at 18:52

## 2025-07-29 RX ADMIN — LIDOCAINE HYDROCHLORIDE 50 MG: 10 INJECTION, SOLUTION INFILTRATION; PERINEURAL at 07:33

## 2025-07-29 RX ADMIN — FENTANYL CITRATE 100 MCG: 50 INJECTION INTRAMUSCULAR; INTRAVENOUS at 07:54

## 2025-07-29 RX ADMIN — ONDANSETRON 4 MG: 2 INJECTION INTRAMUSCULAR; INTRAVENOUS at 08:23

## 2025-07-29 RX ADMIN — HYOSCYAMINE SULFATE 125 MCG: 0.12 TABLET SUBLINGUAL at 16:25

## 2025-07-29 RX ADMIN — SODIUM CHLORIDE 8 MCG: 9 INJECTION, SOLUTION INTRAVENOUS at 08:15

## 2025-07-29 RX ADMIN — SODIUM CHLORIDE: 9 INJECTION, SOLUTION INTRAVENOUS at 08:31

## 2025-07-29 RX ADMIN — ONDANSETRON 4 MG: 2 INJECTION, SOLUTION INTRAMUSCULAR; INTRAVENOUS at 06:32

## 2025-07-29 RX ADMIN — SODIUM CHLORIDE 12 MCG: 9 INJECTION, SOLUTION INTRAVENOUS at 08:31

## 2025-07-29 RX ADMIN — GABAPENTIN 250 MG: 250 SUSPENSION ORAL at 21:20

## 2025-07-29 RX ADMIN — ENOXAPARIN SODIUM 40 MG: 40 INJECTION SUBCUTANEOUS at 19:39

## 2025-07-29 RX ADMIN — HYDROMORPHONE HYDROCHLORIDE 0.4 MG: 0.2 INJECTION, SOLUTION INTRAMUSCULAR; INTRAVENOUS; SUBCUTANEOUS at 10:13

## 2025-07-29 RX ADMIN — FENTANYL CITRATE 50 MCG: 50 INJECTION INTRAMUSCULAR; INTRAVENOUS at 07:18

## 2025-07-29 RX ADMIN — ACETAMINOPHEN 975 MG: 325 SOLUTION ORAL at 18:10

## 2025-07-29 RX ADMIN — ACETAMINOPHEN 975 MG: 325 TABLET ORAL at 06:15

## 2025-07-29 RX ADMIN — Medication 3 G: at 07:29

## 2025-07-29 RX ADMIN — SODIUM CHLORIDE, SODIUM LACTATE, POTASSIUM CHLORIDE, AND CALCIUM CHLORIDE: .6; .31; .03; .02 INJECTION, SOLUTION INTRAVENOUS at 07:18

## 2025-07-29 RX ADMIN — HYOSCYAMINE SULFATE 125 MCG: 0.12 TABLET SUBLINGUAL at 21:20

## 2025-07-29 RX ADMIN — PROPOFOL 50 MG: 10 INJECTION, EMULSION INTRAVENOUS at 08:09

## 2025-07-29 RX ADMIN — PROCHLORPERAZINE EDISYLATE 5 MG: 5 INJECTION INTRAMUSCULAR; INTRAVENOUS at 09:27

## 2025-07-29 RX ADMIN — Medication 0.15 MG: at 07:13

## 2025-07-29 RX ADMIN — FENTANYL CITRATE 50 MCG: 50 INJECTION INTRAMUSCULAR; INTRAVENOUS at 09:10

## 2025-07-29 RX ADMIN — GABAPENTIN 600 MG: 300 CAPSULE ORAL at 06:15

## 2025-07-29 RX ADMIN — KETOROLAC TROMETHAMINE 15 MG: 30 INJECTION, SOLUTION INTRAMUSCULAR at 09:03

## 2025-07-29 RX ADMIN — SUGAMMADEX 300 MG: 100 INJECTION, SOLUTION INTRAVENOUS at 08:22

## 2025-07-29 RX ADMIN — ONDANSETRON 4 MG: 2 INJECTION, SOLUTION INTRAMUSCULAR; INTRAVENOUS at 08:58

## 2025-07-29 RX ADMIN — KETOROLAC TROMETHAMINE 15 MG: 30 INJECTION, SOLUTION INTRAMUSCULAR at 21:20

## 2025-07-29 RX ADMIN — HYOSCYAMINE SULFATE 125 MCG: 0.12 TABLET SUBLINGUAL at 09:23

## 2025-07-29 RX ADMIN — KETOROLAC TROMETHAMINE 15 MG: 30 INJECTION, SOLUTION INTRAMUSCULAR at 16:25

## 2025-07-29 ASSESSMENT — ACTIVITIES OF DAILY LIVING (ADL)
ADLS_ACUITY_SCORE: 18
ADLS_ACUITY_SCORE: 21
ADLS_ACUITY_SCORE: 18

## 2025-07-29 NOTE — ANESTHESIA POSTPROCEDURE EVALUATION
Patient: Michael Cunningham    Procedure: Procedure(s):  GASTRECTOMY, SLEEVE, LAPAROSCOPIC       Anesthesia Type:  General    Note:  Disposition: Inpatient   Postop Pain Control: Uneventful            Sign Out: Well controlled pain   PONV: No   Neuro/Psych: Uneventful            Sign Out: Acceptable/Baseline neuro status   Airway/Respiratory: Uneventful            Sign Out: Acceptable/Baseline resp. status   CV/Hemodynamics: Uneventful            Sign Out: Acceptable CV status; No obvious hypovolemia; No obvious fluid overload   Other NRE: NONE   DID A NON-ROUTINE EVENT OCCUR? No           Last vitals:  Vitals Value Taken Time   /63 07/29/25 09:45   Temp 36.2  C (97.2  F) 07/29/25 09:45   Pulse 55 07/29/25 09:54   Resp 14 07/29/25 09:54   SpO2 97 % 07/29/25 09:54   Vitals shown include unfiled device data.    Electronically Signed By: Larry Wang MD  July 29, 2025  10:32 AM

## 2025-07-29 NOTE — ANESTHESIA PREPROCEDURE EVALUATION
Anesthesia Pre-Procedure Evaluation    Patient: Michael Cunningham   MRN: [id number] : 1980          Procedure : Procedure(s):  GASTRECTOMY, SLEEVE, LAPAROSCOPIC         Past Medical History:   Diagnosis Date    Anxiety     Depressive disorder     Diabetes (H)     Hypertension     LINDA (obstructive sleep apnea)     Uses CPAP    Weight gain due to medication     abilify: rapid gain and developed borderline diabetes Fall of : glucose 198 and A1c of 6.3%.      Past Surgical History:   Procedure Laterality Date    OTHER SURGICAL HISTORY      nose       Allergies   Allergen Reactions    Sulfa Antibiotics Anaphylaxis      Social History     Tobacco Use    Smoking status: Former     Current packs/day: 0.00     Types: Cigarettes     Quit date:      Years since quittin.5     Passive exposure: Never    Smokeless tobacco: Former    Tobacco comments:     Gio pouches - quit 10/2024   Substance Use Topics    Alcohol use: Not Currently      Wt Readings from Last 1 Encounters:   25 (!) 140.6 kg (310 lb)        Anesthesia Evaluation            ROS/MED HX  ENT/Pulmonary:     (+) sleep apnea,                                       Neurologic:       Cardiovascular:     (+)  hypertension- -   -  - -                                      METS/Exercise Tolerance:     Hematologic:       Musculoskeletal:       GI/Hepatic:       Renal/Genitourinary:       Endo:     (+)  type II DM,             Obesity,       Psychiatric/Substance Use: Comment: adhd      Infectious Disease:       Malignancy:       Other:              Physical Exam  Airway  Mallampati: II  TM distance: >3 FB  Neck ROM: full    Cardiovascular - normal exam   Dental     Pulmonary - normal exam      Neurological - normal exam  He appears awake, alert and oriented x3.    Other Findings       OUTSIDE LABS:  CBC:   Lab Results   Component Value Date    WBC 6.4 2025    WBC 5.5 2024    HGB 17.2 2025    HGB 17.6 2024    HCT 47.7  "07/03/2025    HCT 48.1 12/18/2024     07/03/2025     12/18/2024     BMP:   Lab Results   Component Value Date     07/03/2025     12/18/2024    POTASSIUM 4.1 07/03/2025    POTASSIUM 4.1 12/18/2024    CHLORIDE 103 07/03/2025    CHLORIDE 103 12/18/2024    CO2 24 07/03/2025    CO2 27 12/18/2024    BUN 18.4 07/03/2025    BUN 13.4 12/18/2024    CR 0.91 07/03/2025    CR 0.96 12/18/2024     (H) 07/29/2025     (H) 07/03/2025     COAGS:   Lab Results   Component Value Date    PTT 29 07/03/2025    INR 1.01 07/03/2025     POC: No results found for: \"BGM\", \"HCG\", \"HCGS\"  HEPATIC:   Lab Results   Component Value Date    ALBUMIN 4.4 07/03/2025    PROTTOTAL 6.9 07/03/2025    ALT 32 07/03/2025    AST 28 07/03/2025    ALKPHOS 55 07/03/2025    BILITOTAL 0.5 07/03/2025     OTHER:   Lab Results   Component Value Date    A1C 5.4 04/22/2025    EYAD 9.5 07/03/2025    TSH 1.22 12/18/2024       Anesthesia Plan    ASA Status:  3       Anesthesia Type: General.  Airway: oral.  Induction: intravenous.  Maintenance: Balanced.   Techniques and Equipment:       - Monitoring Plan: standard ASA monitoring     Consents    Anesthesia Plan(s) and associated risks, benefits, and realistic alternatives discussed. Questions answered and patient/representative(s) expressed understanding.     - Discussed: CRNA     - Discussed with:  Patient        - Pt is DNR/DNI Status: no DNR          Postoperative Care    Pain management: plan for postoperative opioid use, multimodal analgesia.     Comments:                   Larry Wang MD    I have reviewed the pertinent notes and labs in the chart from the past 30 days and (re)examined the patient.  Any updates or changes from those notes are reflected in this note.    Clinically Significant Risk Factors Present on Admission                   # Hypertension: Noted on problem list         # Severe Obesity: Estimated body mass index is 39.27 kg/m  as calculated from the " "following:    Height as of this encounter: 1.892 m (6' 2.5\").    Weight as of this encounter: 140.6 kg (310 lb). with complications                    "

## 2025-07-29 NOTE — OP NOTE
[facility]  Operative Note    Pre-operative diagnosis: Class 3 severe obesity due to excess calories with serious comorbidity and body mass index (BMI) of 40.0 to 44.9 in adult (H) [E66.813, Z68.41]   Post-operative diagnosis Morbid obesity   Procedure: Procedure(s):  GASTRECTOMY, SLEEVE, LAPAROSCOPIC   Surgeon: Real Hightower MD   Assistants(s): The assistance of Valentina Velarde CNP, was necessary for retraction and exposure during the course of the case.   Anesthesia: General with Block    Estimated blood loss: Less than 50 ml        Operative Indication:  Michael Cunningham has a Body mass index is 39.27 kg/m . with associated co morbidities including LINDA requiring CPAP, type 2 DM, HTN. he has attempted weight loss through medical management, diet, and exercise alone without longterm success.  he is therefore pursuing bariatric surgery as a means of achieving long term weight loss and resolution of his bariatric comorbidities.       Drains: None   Specimens: Sleeve Gastrectomy       Findings: None.   Complications: None.           Description of procedure:    The patient was brought to the operating room where after induction of general anesthesia with endotracheal intubation, they were positioned with both arms out.  After a procedural pause, we began by injecting quarter percent Marcaine and the skin 20 cm inferior to the xiphoid process and just to the left of midline.  This was then sharply incised and access to the abdomen gained with an optical 10 mm trocar.  The abdomen was insufflated.  We then proceeded to place 2 additional 5 mm ports, and one additional 15 mm port across the upper abdomen after injection of local anesthetic.  An incision was made in the epigastric region for placement of the Wellington liver retractor.  Bilateral TAP blocks were performed under visual guidance with 0.25% marcaine, 25 cc per side.  The patient was then put into reverse Trendelenburg body  positioning.    On initial survey, the stomach appeared normal with no evidence of a hiatal hernia.  We began our dissection by dividing the omentum from the greater curvature of the stomach with the Harmonic scalpel.  Distally this was mobilized until we were within 6 cm of the pylorus, which was identified by palpation with the laparoscopic graspers.  Proximally this dissection was carried up along the greater curvature of the stomach, dividing the short gastric vessels and posterior adhesions between the stomach and retroperitoneum until we reached the angle of His.  The left branch of the diaphragmatic nery was identified.  Satisfied with our mobilization of the stomach, we then had our anesthesia colleagues advance a 32 Setswana red rubber catheter into the stomach where was then manipulated until it lay flush against the lesser curvature stomach, terminating near the pylorus.      We then proceeded to begin dividing the stomach, first with a 60 mm black load Endo ANDREW stapler which was positioned adjacent to the red rubber catheter across the antrum, with approximately 1 cm of distance between the lateral edge of the catheter and medial edge of the stapler.  Adequate space at the incisura was ensured.  After firing the staple load, we then proceeded to perform the remainder of the sleeve gastrectomy, dividing the stomach with sequential firings of the green, yellow, and blue staple loads until we reached the angle of His.  For each firing, we ensured there is approximately 1 cm of space between the lateral aspect of the catheter and medial aspect of the stapler.  No staple line reinforcement was utilized.  The staple line was not over sewn.  A total of 6 staple loads were utilized.  With the stomach thus fully resected, we inspected our staple line which appeared hemostatic.  Clips were then placed along the staple line we could see vessels extending visibly to the edge.  We then sutured the proximal aspect of  the staple line to the left branch of the diaphragmatic nery with a 2-0 silk suture.      The resected stomach was then removed from the abdominal cavity through the 15 mm port site.  This fascial defect was then closed with a interrupted 0 Vicryl suture.  The remainder of the local anesthetic was then injected into the skin and fascia surrounding the port sites.  The Wellington liver retractor was removed under direct visualization and insufflation then released.  The ports were then removed and the skin closed with interrupted 4-0 Vicryl sutures.      Real Hightower MD, FACS  Office: [phone]  [facility]   General and Bariatric Surgery

## 2025-07-29 NOTE — ANESTHESIA PROCEDURE NOTES
Airway         Procedure Start/Stop Times: 7/29/2025 7:35 AM  Staff -        CRNA: Chad Novoa APRN CRNA       Performed By: CRNA  Consent for Airway        Urgency: elective  Indications and Patient Condition       Indications for airway management: vance-procedural       Induction type:intravenous       Mask difficulty assessment: 2 - vent by mask + OA or adjuvant +/- NMBA    Final Airway Details       Final airway type: endotracheal airway       Successful airway: ETT - single and Oral  Endotracheal Airway Details        ETT size (mm): 8.0       Cuffed: yes       Cuff volume (mL): 9       Successful intubation technique: video laryngoscopy       VL Blade Size: Glidescope 4       Grade View of Cords: 1       Adjucts: stylet       Position: Right       Measured from: lips       Secured at (cm): 23       Bite block used: None    Post intubation assessment        Placement verified by: capnometry, equal breath sounds and chest rise        Number of attempts at approach: 1       Number of other approaches attempted: 0       Secured with: tape       Ease of procedure: easy       Dentition: Intact    Medication(s) Administered   Medication Administration Time: 7/29/2025 7:35 AM

## 2025-07-29 NOTE — ANESTHESIA CARE TRANSFER NOTE
Patient: Michael Cunningham    Procedure: Procedure(s):  GASTRECTOMY, SLEEVE, LAPAROSCOPIC       Diagnosis: Class 3 severe obesity due to excess calories with serious comorbidity and body mass index (BMI) of 40.0 to 44.9 in adult (H) [E66.813, Z68.41]  Diagnosis Additional Information: No value filed.    Anesthesia Type:   General     Note:    Oropharynx: oropharynx clear of all foreign objects and spontaneously breathing  Level of Consciousness: awake  Oxygen Supplementation: face mask  Level of Supplemental Oxygen (L/min / FiO2): 8  Independent Airway: airway patency satisfactory and stable  Dentition: dentition unchanged  Vital Signs Stable: post-procedure vital signs reviewed and stable  Report to RN Given: handoff report given  Patient transferred to: PACU    Handoff Report: Identifed the Patient, Identified the Reponsible Provider, Reviewed the pertinent medical history, Discussed the surgical course, Reviewed Intra-OP anesthesia mangement and issues during anesthesia, Set expectations for post-procedure period and Allowed opportunity for questions and acknowledgement of understanding      Vitals:  Vitals Value Taken Time   /83 07/29/25 08:39   Temp 98.3    Pulse 77 07/29/25 08:39   Resp 9 07/29/25 08:39   SpO2 100 % 07/29/25 08:39   Vitals shown include unfiled device data.    Electronically Signed By: BC Schrader CRNA  July 29, 2025  8:41 AM

## 2025-07-29 NOTE — ANESTHESIA PROCEDURE NOTES
"Intrathecal injection Procedure Note    Pre-Procedure   Staff -        Anesthesiologist:  Larry Wang MD       Performed By: anesthesiologist       Location: pre-op       Procedure Start/Stop Times: 7/29/2025 7:13 AM and 7/29/2025 7:16 AM       Pre-Anesthestic Checklist: patient identified, IV checked, risks and benefits discussed, informed consent, monitors and equipment checked, pre-op evaluation, at physician/surgeon's request and post-op pain management  Timeout:       Correct Patient: Yes        Correct Procedure: Yes        Correct Site: Yes        Correct Position: Yes   Procedure Documentation  Procedure: intrathecal injection         Patient Position: sitting       Skin prep: Chloraprep       Insertion Site: L3-4. (midline approach).       Needle Gauge: 24.        Spinal Needle Type: Sprotte       Introducer used       # of attempts: 1 and  # of redirects:     Assessment/Narrative         CSF fluid: clear.    Medication(s) Administered   Morphine PF 1 mg/mL (Intrathecal) - Intrathecal   0.15 mg - 7/29/2025 7:13:00 AM  Medication Administration Time: 7/29/2025 7:13 AM      FOR [facility] ([facility]) ONLY:   Pain Team Contact information: please page the Pain Team Via [employer]. Search \"Pain\". During daytime hours, please page the attending first. At night please page the resident first.      "

## 2025-07-29 NOTE — PLAN OF CARE
Goal Outcome Evaluation:    ..    Care Plan Progress Note:    Name: Michael Cunningham  :   1980  MRN:   [id number]    Problem: Bariatric Procedure  Goal: Prevent post-op bariatric complications.  Appropriate oral intake.  Discharge needs are met.  Intervention:   Post Op Day 0/1 (progress as patient tolerates)   -Notify MD of excessive nausea   -NPO per MD orders; read exceptions to the order   -Toothette with 1/2 inch warm water at bedside until able to take PO   -Do not give ice chips, straws, or carbonation    -Whenever drinking liquids, patient must be upright with both feet on the floor   -No more than 30mL per sip   -All liquids must be at approximately room temperature (no extreme temperatures).   -After 2 hours of 30mL PO q30min, you may take 30mL as tolerated and advance to a clear liquid diet    -No oral pills until after the patient tolerates the 2 hours of 30mL sips (exceptions: sublingual medications can be given anytime; liquid gabapentin can be given after 1 hours of sips)   -Strict intake and output   -Bladder scan every 4 hours until voiding freely   -If tolerating sips, start bariatric clear liquid diet   -If tolerating bariatric clears, advance to a bariatric full liquid diet  Discharge Planning   -Educate patient about pill size   -Patient should take no pill greater than 1/4 inch   -Send pill cutter home with patient   -Nurse to review home discharge instructions  Outcome:    Shift  Intake: Total intake= 450 mls  Output: Voided 750 mls  Bladder Scan: N/A  Pain: Well managed with scheduled meds  Incision:5 lap sites, CDI  Nausea: None  Activity: Ambulated multiple times on the unit  Incentive Spirometry: 4000 mls  Abdominal Binder:In the room    Margarita Mayer RN  2025  6:42 PM

## 2025-07-29 NOTE — INTERVAL H&P NOTE
"I have reviewed the surgical (or preoperative) H&P that is linked to this encounter, and examined the patient. There are no significant changes    Real Hightower MD, [facility]  Office: [phone]  [facility]   General and Bariatric Surgery      Clinical Conditions Present on Arrival:  Clinically Significant Risk Factors Present on Admission                     # Severe Obesity: Estimated body mass index is 39.27 kg/m  as calculated from the following:    Height as of this encounter: 1.892 m (6' 2.5\").    Weight as of this encounter: 140.6 kg (310 lb). with complications       "

## 2025-07-29 NOTE — PHARMACY-ADMISSION MEDICATION HISTORY
Pharmacist Admission Medication History    Admission medication history is complete. The information provided in this note is only as accurate as the sources available at the time of the update.    Information Source(s): Patient via in-person    Pertinent Information: None    Changes made to PTA medication list:  Added: None  Deleted: Mounjaro  Changed: None    Allergies reviewed with patient and updates made in EHR: yes    Medication History Completed By: Davion Mcqueen RPH 7/29/2025 5:50 PM    PTA Med List   Medication Sig Note Last Dose/Taking    amLODIPine (NORVASC) 5 MG tablet Take 1 tablet (5 mg) by mouth daily.  7/28/2025 Morning    amphetamine-dextroamphetamine (ADDERALL XR) 15 MG 24 hr capsule Take 15 mg by mouth.  7/28/2025 Morning    CAPLYTA 42 MG capsule Take 42 mg by mouth daily.  7/28/2025 Morning    cyanocobalamin (VITAMIN B-12) 1000 MCG sublingual tablet Place 1 tablet (1,000 mcg) under the tongue daily. Start right after surgery. 7/29/2025: Has not started Taking    losartan (COZAAR) 100 MG tablet Take 1 tablet (100 mg) by mouth daily.  7/28/2025 Morning    metFORMIN (GLUCOPHAGE) 500 MG tablet TAKE 2 TABLETS BY MOUTH ONCE DAILY AFTER A MEAL*  7/28/2025 Morning    omeprazole (PRILOSEC) 20 MG DR capsule Take 1 capsule (20 mg) by mouth daily. Start day after surgery, open contents and sprinkle on food for first 6 weeks. Take daily for 3 months after surgery. 7/29/2025: Has not started Taking    Pediatric Multivitamins-Iron (MULTIVITAMINS PLUS IRON CHILD) 18 MG CHEW Take 1 chew tab by mouth 2 times daily. Ok to substitute with any chewable that contains 18 mg of iron, Vitamin A, Thiamine and Zinc. 7/29/2025: Has not started Taking    ursodiol (ACTIGALL) 300 MG capsule Take 1 capsule (300 mg) by mouth 2 times daily. Start 2 weeks after surgery, do not open-take with warm liquid. Take twice a day for 6 months. 7/29/2025: Has not started  Taking

## 2025-07-30 VITALS
SYSTOLIC BLOOD PRESSURE: 156 MMHG | WEIGHT: 313.2 LBS | DIASTOLIC BLOOD PRESSURE: 77 MMHG | HEART RATE: 76 BPM | OXYGEN SATURATION: 95 % | BODY MASS INDEX: 38.94 KG/M2 | RESPIRATION RATE: 18 BRPM | HEIGHT: 75 IN | TEMPERATURE: 98.3 F

## 2025-07-30 LAB
PATH REPORT.COMMENTS IMP SPEC: NORMAL
PATH REPORT.COMMENTS IMP SPEC: NORMAL
PATH REPORT.FINAL DX SPEC: NORMAL
PATH REPORT.GROSS SPEC: NORMAL
PATH REPORT.MICROSCOPIC SPEC OTHER STN: NORMAL
PATH REPORT.RELEVANT HX SPEC: NORMAL
PHOTO IMAGE: NORMAL

## 2025-07-30 PROCEDURE — 250N000013 HC RX MED GY IP 250 OP 250 PS 637: Performed by: NURSE PRACTITIONER

## 2025-07-30 PROCEDURE — 999N000157 HC STATISTIC RCP TIME EA 10 MIN

## 2025-07-30 PROCEDURE — 250N000011 HC RX IP 250 OP 636: Performed by: NURSE PRACTITIONER

## 2025-07-30 RX ORDER — HYOSCYAMINE SULFATE 0.12 MG/1
125 TABLET SUBLINGUAL EVERY 4 HOURS PRN
Qty: 20 TABLET | Refills: 0 | Status: SHIPPED | OUTPATIENT
Start: 2025-07-30

## 2025-07-30 RX ORDER — DEXTROAMPHETAMINE SACCHARATE, AMPHETAMINE ASPARTATE MONOHYDRATE, DEXTROAMPHETAMINE SULFATE AND AMPHETAMINE SULFATE 3.75; 3.75; 3.75; 3.75 MG/1; MG/1; MG/1; MG/1
15 CAPSULE, EXTENDED RELEASE ORAL
COMMUNITY
Start: 2025-09-10

## 2025-07-30 RX ORDER — GABAPENTIN 250 MG/5ML
250 SOLUTION ORAL EVERY 8 HOURS PRN
Qty: 60 ML | Refills: 0 | Status: SHIPPED | OUTPATIENT
Start: 2025-07-30

## 2025-07-30 RX ADMIN — ACETAMINOPHEN 975 MG: 325 SOLUTION ORAL at 00:19

## 2025-07-30 RX ADMIN — HYOSCYAMINE SULFATE 125 MCG: 0.12 TABLET SUBLINGUAL at 06:39

## 2025-07-30 RX ADMIN — KETOROLAC TROMETHAMINE 15 MG: 30 INJECTION, SOLUTION INTRAMUSCULAR at 08:24

## 2025-07-30 RX ADMIN — KETOROLAC TROMETHAMINE 15 MG: 30 INJECTION, SOLUTION INTRAMUSCULAR at 02:33

## 2025-07-30 RX ADMIN — HYOSCYAMINE SULFATE 125 MCG: 0.12 TABLET SUBLINGUAL at 08:24

## 2025-07-30 RX ADMIN — OMEPRAZOLE 20 MG: 20 CAPSULE, DELAYED RELEASE ORAL at 06:37

## 2025-07-30 RX ADMIN — GABAPENTIN 250 MG: 250 SUSPENSION ORAL at 06:37

## 2025-07-30 RX ADMIN — HYOSCYAMINE SULFATE 125 MCG: 0.12 TABLET SUBLINGUAL at 00:19

## 2025-07-30 RX ADMIN — ACETAMINOPHEN 975 MG: 325 SOLUTION ORAL at 06:35

## 2025-07-30 ASSESSMENT — ACTIVITIES OF DAILY LIVING (ADL)
ADLS_ACUITY_SCORE: 18

## 2025-07-30 NOTE — PROGRESS NOTES
"Bariatric Surgery Progress Note    1 Day Post-Op    Procedure(s):  GASTRECTOMY, SLEEVE, LAPAROSCOPIC    Subjective:   Patient reports pain is controlled. Patient is tolerating bariatric full liquid diet, ambulating and voiding. Patient denies fever, chills, dizziness, blurred vision, headache, nausea, vomiting, SOB, CP, and leg pain.    Patient Vitals for the past 24 hrs:   BP Temp Temp src Pulse Resp SpO2 Height Weight   07/30/25 0716 (!) 156/77 98.3  F (36.8  C) Oral 76 18 95 % -- --   07/30/25 0329 (!) 145/72 98.4  F (36.9  C) Oral 73 16 96 % -- --   07/30/25 0002 138/62 98.3  F (36.8  C) Oral 86 16 96 % -- --   07/29/25 2000 (!) 148/70 98.3  F (36.8  C) Oral 71 14 93 % -- --   07/29/25 1845 138/68 98.4  F (36.9  C) Oral 75 14 95 % -- --   07/29/25 1745 (!) 140/69 98  F (36.7  C) Oral 91 14 93 % -- --   07/29/25 1645 139/77 98.1  F (36.7  C) Oral 64 14 97 % -- --   07/29/25 1615 (!) 156/72 98  F (36.7  C) Oral 64 14 (!) 91 % -- --   07/29/25 1545 135/62 98.2  F (36.8  C) Oral 79 14 92 % -- --   07/29/25 1535 -- -- -- -- -- -- 1.905 m (6' 3\") (!) 142.1 kg (313 lb 3.2 oz)   07/29/25 1500 (!) 158/75 98.5  F (36.9  C) Temporal 56 13 95 % -- --   07/29/25 1430 (!) 165/76 -- -- 58 16 96 % -- --   07/29/25 1400 (!) 167/95 -- -- 64 14 95 % -- --   07/29/25 1330 (!) 156/76 -- -- 57 13 96 % -- --   07/29/25 1300 (!) 164/86 -- -- 53 12 -- -- --   07/29/25 1230 (!) 154/74 -- -- (!) 48 11 -- -- --   07/29/25 1215 (!) 154/75 -- -- 55 10 94 % -- --   07/29/25 1200 139/63 -- -- 57 14 93 % -- --   07/29/25 1145 128/59 -- -- 55 (!) 9 95 % -- --   07/29/25 1130 136/61 -- -- 54 12 95 % -- --   07/29/25 1115 132/62 -- -- 53 10 95 % -- --   07/29/25 1100 138/60 -- -- 57 14 95 % -- --   07/29/25 1045 136/64 -- -- 56 12 92 % -- --   07/29/25 1015 (!) 141/63 97.2  F (36.2  C) -- 58 12 92 % -- --   07/29/25 1000 (!) 142/72 97.2  F (36.2  C) -- 64 13 96 % -- --   07/29/25 0945 138/63 97.2  F (36.2  C) -- 52 16 96 % -- --   07/29/25 0930 " 131/60 97.2  F (36.2  C) -- 66 18 97 % -- --   07/29/25 0915 (!) 155/59 97.2  F (36.2  C) -- (!) 49 21 96 % -- --   07/29/25 0900 (!) 170/71 -- -- 51 22 96 % -- --       Physical Exam:  General: A/O, NAD  Ab:       Soft, + BS, expected ttp POD 1; The wound/ dressings are clean, dry, and intact  :      Patient is voiding adequate amount    Admission on 07/29/2025   Component Date Value    GLUCOSE BY METER POCT 07/29/2025 128 (H)     GLUCOSE BY METER POCT 07/29/2025 214 (H)        Assessment/Plan:   Patient is progressing well after surgery. Once he is meeting oral intake goals, he should be able to discharge home today. Discharge orders and instructions are placed    Discussed discharge instructions with the patient along with signs and symptoms to watch for post-op.  Patient verbalized understanding of the plan, including:    - Use of incentive spirometer and walking as tolerated   - Use of abdominal binder if needed   - Importance of getting 48-64 ounces of water in daily for hydration    - Use of medication cups for measuring out sips for the next 3-4 days.   - Bariatric full liquid diet for the next week.   - Drink a protein shake providing 20-30 grams of protein in addition to meals daily; aim for at least 40 grams of protein daily   - Attend the post-op dietary class next week   - Take omeprazole daily for the next 3 months and avoid/eliminate NSAID usage   - Take chewable MVI with 18mg iron twice a day and SL B12 1,000-2,500 mcg daily.    - Call Bariatric clinic with any questions or concerns   - If patient needs to be seen in the emergency department for any reason, he needs to return [facility].    Patient verbalized understanding of the plan. Bariatric nurse clinician will call him in a couple days when he gets home to check in with him.    Greater than 45 minutes were spent with this patient with more than 50% of that time spent on education.    Valentina Velarde, CNP  [phone]  [facility]  General and Bariatric Surgery

## 2025-07-30 NOTE — PLAN OF CARE
Goal Outcome Evaluation:         10:50 AM... Pt was discharged to home, teaching was done and instructions were explained, pt verbalized understanding.

## 2025-07-30 NOTE — PROGRESS NOTES
Care Plan Progress Note:    Name: Michael Cunningham  :   1980  MRN:   [id number]    Problem: Bariatric Procedure  Goal: Prevent post-op bariatric complications.  Appropriate oral intake.  Discharge needs are met.  Intervention:   Post Op Day 0/1 (progress as patient tolerates)   -Notify MD of excessive nausea   -NPO per MD orders; read exceptions to the order   -Toothette with 1/2 inch warm water at bedside until able to take PO   -Do not give ice chips, straws, or carbonation    -Whenever drinking liquids, patient must be upright with both feet on the floor   -No more than 30mL per sip   -All liquids must be at approximately room temperature (no extreme temperatures).   -After 2 hours of 30mL PO q30min, you may take 30mL as tolerated and advance to a clear liquid diet    -No oral pills until after the patient tolerates the 2 hours of 30mL sips (exceptions: sublingual medications can be given anytime; liquid gabapentin can be given after 1 hours of sips)   -Strict intake and output   -Bladder scan every 4 hours until voiding freely   -If tolerating sips, start bariatric clear liquid diet   -If tolerating bariatric clears, advance to a bariatric full liquid diet  Discharge Planning   -Educate patient about pill size   -Patient should take no pill greater than 1/4 inch   -Send pill cutter home with patient   -Nurse to review home discharge instructions  Outcome:    Shift  Intake: 690 ml  Output: 1750  Bladder Scan: voiding freely. TOV ended.  Pain: 1-2. Managed effectively with scheduled meds.  Incision: CDI  Nausea: Denies  Activity: Ambulated in room overnight. Tolerated well.  Incentive Spirometry: not done overnight   Abdominal Binder: in room    Venessa Shanks RN  2025  [phone]

## 2025-07-30 NOTE — DISCHARGE SUMMARY
Bariatric Surgery Discharge Summary    Primary Care Physician:  Paresh Gill    Discharge Provider: BC Low CNP  Admission Date: 7/29/2025  Discharge Date: July 30, 2025     Primary Diagnosis at Discharge:   Patient Active Problem List   Diagnosis    ADHD (attention deficit hyperactivity disorder)    Hypertension    Hypertriglyceridemia    LINDA (obstructive sleep apnea)    Prediabetes    Pulmonary nodule    Morbid obesity (H)    Depression, unspecified depression type    Diabetes mellitus, type 2 (H)    Morbid obesity with BMI of 40.0-44.9, adult (H)      Disposition: Home   Condition at Discharge: Stable     Surgery: Laparoscopic Sleeve Gastrectomy     Hospital Summary:   Michael Cunningham was admitted for morbid obesity.  he underwent an uncomplicated laparoscopic sleeve gastrectomy.  Following a brief recovery in the PACU, he was transferred to the Med/Surg floor for the remainder of his stay.  his post operative course was uneventful.  On POD # 1 he was discharged home tolerating a full liquid diet, ambulating without assistance, and pain controlled with oral analgesics.        Discharge Medications:      Medication List        Started      gabapentin 250 MG/5ML solution  Commonly known as: NEURONTIN  250 mg, Oral, EVERY 8 HOURS PRN     hyoscyamine 0.125 MG sublingual tablet  Commonly known as: LEVSIN/SL  125 mcg, Sublingual, EVERY 4 HOURS PRN     Tylenol Dissolve Packs 500 MG Pack  Generic drug: Acetaminophen  1,000 mg, Oral, EVERY 6 HOURS            Modified      amphetamine-dextroamphetamine 15 MG 24 hr capsule  Commonly known as: ADDERALL XR  Start taking on: September 10, 2025  What changed:   additional instructions  These instructions start on September 10, 2025. If you are unsure what to do until then, ask your doctor or other care provider.            Discontinued      metFORMIN 500 MG tablet  Commonly known as: GLUCOPHAGE              Discharge Instructions:  Follow up appointment  with Primary Care Physician: Paresh Gill  Follow up appointment with Dr. Hightower in 2 weeks  Attend the post-op dietary class as scheduled    Post operative instructions:   Diet:     For one week following your surgery or until you meet with the dietician, you will be on a full liquid diet.  It is extremely important to follow the liquid diet to allow for optimal healing and to prevent food from becoming lodged at the gastric outlet.    Protein is an important part of the diet after surgery; therefore, it is necessary to drink fluids that are high in protein.  Proteins are building blocks that help you heal after surgery and help preserve your muscle mass while you are losing weight.      Including carbohydrates in the diet is also important after surgery to prevent your body from burning fat for energy (Ketosis).  These carbohydrates include: unsweetened applesauce, blended canned fruit (in its own juice), Stage I baby food fruit, thin cream of wheat, light yogurt (no fruit chunks), or 100% fruit juice diluted (50% juice/50% water).     Remember to take 1 Multivitamin with Iron 2 times daily and 1000 mcg of Sublingual B-12 daily.       Aim for 40-50 grams of protein daily during this week.    Sip 48-64 ounces of liquid per day in addition to full liquid meals/supplements.    After 2 weeks of the full liquid diet, you will advance to the pureed diet, as instructed.    Activity: You should continue to be active at home including ambulating frequently.  If possible try to limit the amount of time spent in bed.    Restrictions: you should avoid lifting anything more than 20 pounds or strenuous physical activity for a total of 2 weeks.        BC Rowe [facility]  [phone]  [facility]

## 2025-07-30 NOTE — DISCHARGE INSTRUCTIONS
If you are worried about whether or not you need to be seen or if something is wrong, please call your bariatric nurse line at [phone] or the bariatric clinic at [phone]. If you need to be seen in the emergency department for any reason in the next 30 days, please return to [facility]. The bariatric team should be involved in your care/diet even if the problem is unrelated to your surgery.

## 2025-08-05 ENCOUNTER — VIRTUAL VISIT (OUTPATIENT)
Dept: SURGERY | Facility: CLINIC | Age: 45
End: 2025-08-05
Payer: COMMERCIAL

## 2025-08-05 DIAGNOSIS — Z98.84 BARIATRIC SURGERY STATUS: Primary | ICD-10-CM

## 2025-09-02 ENCOUNTER — VIRTUAL VISIT (OUTPATIENT)
Dept: SURGERY | Facility: CLINIC | Age: 45
End: 2025-09-02
Payer: COMMERCIAL

## 2025-09-02 DIAGNOSIS — Z98.84 S/P BARIATRIC SURGERY: Primary | ICD-10-CM

## 2025-09-02 DIAGNOSIS — Z71.3 NUTRITIONAL COUNSELING: ICD-10-CM

## 2025-09-02 PROCEDURE — 99024 POSTOP FOLLOW-UP VISIT: CPT | Mod: 95 | Performed by: DIETITIAN, REGISTERED

## (undated) DEVICE — ENDO SHEARS RENEW LAP ENDOCUT SCISSOR TIP 16.5MM 3142

## (undated) DEVICE — GOWN IMPERVIOUS BREATHABLE SMART XLG 89045

## (undated) DEVICE — VIAL DECANTER STERILE WHITE DYNJDEC06

## (undated) DEVICE — ANTIFOG SOLUTION SEE SHARP 150M TROCAR SWABS 30978 (COI)

## (undated) DEVICE — SOLUTION WATER 1000ML BOTTLE R5000-01

## (undated) DEVICE — SU VICRYL+ 0 27 UR6 VLT VCP603H

## (undated) DEVICE — STPL RELOAD REG TISSUE ECHELON 60 X 3.6MM BLUE GST60B

## (undated) DEVICE — CLIP LIGACLIP LG YELLOW LT400

## (undated) DEVICE — PREP CHLORAPREP 26ML TINTED HI-LITE ORANGE 930815

## (undated) DEVICE — ENDO TROCAR SLEEVE KII Z-THREADED 05X100MM CTS02

## (undated) DEVICE — ENDO TROCAR OPTICAL ACCESS KII Z-THRD 05X100MM CTR03

## (undated) DEVICE — GLOVE BIOGEL PI ULTRATOUCH G SZ 6.5 42165

## (undated) DEVICE — SU SILK 0 SH 30" K834H

## (undated) DEVICE — STPL RELOAD LINEAR CUT ENDOPATH ECHELON 60MM BLK GST60T

## (undated) DEVICE — ENDO TROCAR OPTICAL ACCESS KII Z-THRD 15X100MM C0R37

## (undated) DEVICE — STPL POWERED ECHELON LONG 60MM PLEE60A

## (undated) DEVICE — DRSG BANDAID 1X3" FABRIC CURITY LATEX FREE KC44101

## (undated) DEVICE — TUBING SMOKE EVAC PNEUMOCLEAR HIGH FLOW 0620050250

## (undated) DEVICE — Device

## (undated) DEVICE — GLOVE BIOGEL PI SZ 8.0 40880

## (undated) DEVICE — ESU LIGASURE MARYLAND LAPAROSCOPIC SLR/DVDR 5MMX37CM LF1937

## (undated) DEVICE — SYSTEM CALIBRATION GASTRECTOMY SLEEVE VISIGI 3D 32FR 5232B

## (undated) DEVICE — SU VICRYL+ 4-0 UNDYED PS-2 VCP496ZH

## (undated) DEVICE — SYR 30ML LL W/O NDL 302832

## (undated) DEVICE — STPL RELOAD REG/THK TISSUE ECHELON 60 X 3.8MM GOLD GST60D

## (undated) RX ORDER — FENTANYL CITRATE 50 UG/ML
INJECTION, SOLUTION INTRAMUSCULAR; INTRAVENOUS
Status: DISPENSED
Start: 2025-07-29

## (undated) RX ORDER — BUPIVACAINE HCL/EPINEPHRINE 0.25-.0005
VIAL (ML) INJECTION
Status: DISPENSED
Start: 2025-07-29

## (undated) RX ORDER — BUPIVACAINE HYDROCHLORIDE 2.5 MG/ML
INJECTION, SOLUTION INFILTRATION; PERINEURAL
Status: DISPENSED
Start: 2025-07-29

## (undated) RX ORDER — CEFAZOLIN SODIUM 1 G/3ML
INJECTION, POWDER, FOR SOLUTION INTRAMUSCULAR; INTRAVENOUS
Status: DISPENSED
Start: 2025-07-29

## (undated) RX ORDER — EPHEDRINE SULFATE 50 MG/ML
INJECTION, SOLUTION INTRAMUSCULAR; INTRAVENOUS; SUBCUTANEOUS
Status: DISPENSED
Start: 2025-07-29

## (undated) RX ORDER — DEXAMETHASONE SODIUM PHOSPHATE 10 MG/ML
INJECTION, SOLUTION INTRAMUSCULAR; INTRAVENOUS
Status: DISPENSED
Start: 2025-07-29

## (undated) RX ORDER — LIDOCAINE HYDROCHLORIDE 10 MG/ML
INJECTION, SOLUTION EPIDURAL; INFILTRATION; INTRACAUDAL; PERINEURAL
Status: DISPENSED
Start: 2025-07-29

## (undated) RX ORDER — PROPOFOL 10 MG/ML
INJECTION, EMULSION INTRAVENOUS
Status: DISPENSED
Start: 2025-07-29

## (undated) RX ORDER — ONDANSETRON 2 MG/ML
INJECTION INTRAMUSCULAR; INTRAVENOUS
Status: DISPENSED
Start: 2025-07-29

## (undated) RX ORDER — BUPIVACAINE HYDROCHLORIDE 2.5 MG/ML
INJECTION, SOLUTION EPIDURAL; INFILTRATION; INTRACAUDAL; PERINEURAL
Status: DISPENSED
Start: 2025-07-29

## (undated) RX ORDER — BUPIVACAINE HYDROCHLORIDE AND EPINEPHRINE 2.5; 5 MG/ML; UG/ML
INJECTION, SOLUTION EPIDURAL; INFILTRATION; INTRACAUDAL; PERINEURAL
Status: DISPENSED
Start: 2025-07-29